# Patient Record
Sex: FEMALE | Race: WHITE | NOT HISPANIC OR LATINO | Employment: FULL TIME | URBAN - METROPOLITAN AREA
[De-identification: names, ages, dates, MRNs, and addresses within clinical notes are randomized per-mention and may not be internally consistent; named-entity substitution may affect disease eponyms.]

---

## 2017-06-12 ENCOUNTER — COMMUNICATION - RIVER FALLS (OUTPATIENT)
Dept: FAMILY MEDICINE | Facility: CLINIC | Age: 22
End: 2017-06-12

## 2017-06-12 ENCOUNTER — OFFICE VISIT - RIVER FALLS (OUTPATIENT)
Dept: FAMILY MEDICINE | Facility: CLINIC | Age: 22
End: 2017-06-12

## 2017-06-12 ASSESSMENT — MIFFLIN-ST. JEOR: SCORE: 1211.95

## 2017-09-05 ENCOUNTER — OFFICE VISIT - RIVER FALLS (OUTPATIENT)
Dept: FAMILY MEDICINE | Facility: CLINIC | Age: 22
End: 2017-09-05

## 2017-09-05 ASSESSMENT — MIFFLIN-ST. JEOR: SCORE: 1213.76

## 2017-10-03 ENCOUNTER — OFFICE VISIT - RIVER FALLS (OUTPATIENT)
Dept: FAMILY MEDICINE | Facility: CLINIC | Age: 22
End: 2017-10-03

## 2018-01-04 ENCOUNTER — OFFICE VISIT - RIVER FALLS (OUTPATIENT)
Dept: FAMILY MEDICINE | Facility: CLINIC | Age: 23
End: 2018-01-04

## 2018-01-04 ASSESSMENT — MIFFLIN-ST. JEOR: SCORE: 1234.63

## 2018-01-07 ENCOUNTER — AMBULATORY - RIVER FALLS (OUTPATIENT)
Dept: FAMILY MEDICINE | Facility: CLINIC | Age: 23
End: 2018-01-07

## 2018-01-16 ENCOUNTER — AMBULATORY - RIVER FALLS (OUTPATIENT)
Dept: FAMILY MEDICINE | Facility: CLINIC | Age: 23
End: 2018-01-16

## 2018-01-18 ENCOUNTER — AMBULATORY - RIVER FALLS (OUTPATIENT)
Dept: FAMILY MEDICINE | Facility: CLINIC | Age: 23
End: 2018-01-18

## 2018-04-30 ENCOUNTER — OFFICE VISIT - RIVER FALLS (OUTPATIENT)
Dept: FAMILY MEDICINE | Facility: CLINIC | Age: 23
End: 2018-04-30

## 2018-05-03 ENCOUNTER — OFFICE VISIT - RIVER FALLS (OUTPATIENT)
Dept: FAMILY MEDICINE | Facility: CLINIC | Age: 23
End: 2018-05-03

## 2018-05-03 ASSESSMENT — MIFFLIN-ST. JEOR: SCORE: 1251.64

## 2019-04-10 ENCOUNTER — OFFICE VISIT - RIVER FALLS (OUTPATIENT)
Dept: FAMILY MEDICINE | Facility: CLINIC | Age: 24
End: 2019-04-10
Payer: COMMERCIAL

## 2019-06-24 ENCOUNTER — OFFICE VISIT - RIVER FALLS (OUTPATIENT)
Dept: FAMILY MEDICINE | Facility: CLINIC | Age: 24
End: 2019-06-24
Payer: COMMERCIAL

## 2019-06-26 ENCOUNTER — AMBULATORY - RIVER FALLS (OUTPATIENT)
Dept: FAMILY MEDICINE | Facility: CLINIC | Age: 24
End: 2019-06-26
Payer: COMMERCIAL

## 2019-06-28 LAB
B BURGDOR IGG+IGM SER QL: <0.9
BUN SERPL-MCNC: 11 MG/DL (ref 7–25)
BUN/CREAT RATIO - HISTORICAL: NORMAL (ref 6–22)
CALCIUM SERPL-MCNC: 9.7 MG/DL (ref 8.6–10.2)
CHLORIDE BLD-SCNC: 107 MMOL/L (ref 98–110)
CO2 SERPL-SCNC: 25 MMOL/L (ref 20–32)
CREAT SERPL-MCNC: 0.66 MG/DL (ref 0.5–1.1)
EGFRCR SERPLBLD CKD-EPI 2021: 125 ML/MIN/1.73M2
ERYTHROCYTE [DISTWIDTH] IN BLOOD BY AUTOMATED COUNT: 12.6 % (ref 11–15)
GLUCOSE BLD-MCNC: 95 MG/DL (ref 65–99)
HCT VFR BLD AUTO: 44.5 % (ref 35–45)
HGB BLD-MCNC: 14.4 GM/DL (ref 11.7–15.5)
MCH RBC QN AUTO: 27.4 PG (ref 27–33)
MCHC RBC AUTO-ENTMCNC: 32.4 GM/DL (ref 32–36)
MCV RBC AUTO: 84.8 FL (ref 80–100)
PLATELET # BLD AUTO: 307 10*3/UL (ref 140–400)
PMV BLD: 10.4 FL (ref 7.5–12.5)
POTASSIUM BLD-SCNC: 4.2 MMOL/L (ref 3.5–5.3)
RBC # BLD AUTO: 5.25 10*6/UL (ref 3.8–5.1)
SODIUM SERPL-SCNC: 141 MMOL/L (ref 135–146)
WBC # BLD AUTO: 7.7 10*3/UL (ref 3.8–10.8)

## 2019-07-02 ENCOUNTER — TRANSFERRED RECORDS (OUTPATIENT)
Dept: HEALTH INFORMATION MANAGEMENT | Facility: CLINIC | Age: 24
End: 2019-07-02

## 2019-07-02 ENCOUNTER — COMMUNICATION - RIVER FALLS (OUTPATIENT)
Dept: FAMILY MEDICINE | Facility: CLINIC | Age: 24
End: 2019-07-02
Payer: COMMERCIAL

## 2019-07-03 ENCOUNTER — HOSPITAL ENCOUNTER (INPATIENT)
Facility: CLINIC | Age: 24
LOS: 1 days | Discharge: HOME | DRG: 060 | End: 2019-07-04
Attending: EMERGENCY MEDICINE | Admitting: PSYCHIATRY & NEUROLOGY
Payer: COMMERCIAL

## 2019-07-03 DIAGNOSIS — G35 MULTIPLE SCLEROSIS (H): Primary | ICD-10-CM

## 2019-07-03 DIAGNOSIS — G81.94 LEFT HEMIPARESIS (H): ICD-10-CM

## 2019-07-03 PROBLEM — G37.9 DEMYELINATING DISEASE OF CENTRAL NERVOUS SYSTEM (H): Status: ACTIVE | Noted: 2019-07-03

## 2019-07-03 LAB
ANION GAP SERPL CALCULATED.3IONS-SCNC: 6 MMOL/L (ref 3–14)
APPEARANCE CSF: CLEAR
BASOPHILS # BLD AUTO: 0 10E9/L (ref 0–0.2)
BASOPHILS NFR BLD AUTO: 0.5 %
BUN SERPL-MCNC: 14 MG/DL (ref 7–30)
CALCIUM SERPL-MCNC: 8.8 MG/DL (ref 8.5–10.1)
CHLORIDE SERPL-SCNC: 109 MMOL/L (ref 94–109)
CO2 SERPL-SCNC: 26 MMOL/L (ref 20–32)
COLOR CSF: COLORLESS
CREAT SERPL-MCNC: 0.64 MG/DL (ref 0.52–1.04)
CRP SERPL-MCNC: <2.9 MG/L (ref 0–8)
DIFFERENTIAL METHOD BLD: NORMAL
EOSINOPHIL # BLD AUTO: 0 10E9/L (ref 0–0.7)
EOSINOPHIL NFR BLD AUTO: 0.5 %
ERYTHROCYTE [DISTWIDTH] IN BLOOD BY AUTOMATED COUNT: 12.3 % (ref 10–15)
GFR SERPL CREATININE-BSD FRML MDRD: >90 ML/MIN/{1.73_M2}
GLUCOSE CSF-MCNC: 57 MG/DL (ref 40–70)
GLUCOSE SERPL-MCNC: 90 MG/DL (ref 70–99)
GRAM STN SPEC: NORMAL
HCT VFR BLD AUTO: 40.7 % (ref 35–47)
HGB BLD-MCNC: 13 G/DL (ref 11.7–15.7)
HIV 1+2 AB+HIV1 P24 AG SERPL QL IA: NONREACTIVE
IMM GRANULOCYTES # BLD: 0 10E9/L (ref 0–0.4)
IMM GRANULOCYTES NFR BLD: 0.2 %
INR PPP: 1.31 (ref 0.86–1.14)
LYMPH ABN NFR CSF MANUAL: 97 %
LYMPHOCYTES # BLD AUTO: 2.9 10E9/L (ref 0.8–5.3)
LYMPHOCYTES NFR BLD AUTO: 44.8 %
Lab: NORMAL
MCH RBC QN AUTO: 26.9 PG (ref 26.5–33)
MCHC RBC AUTO-ENTMCNC: 31.9 G/DL (ref 31.5–36.5)
MCV RBC AUTO: 84 FL (ref 78–100)
MONOCYTES # BLD AUTO: 0.5 10E9/L (ref 0–1.3)
MONOCYTES NFR BLD AUTO: 7.6 %
MONOS+MACROS NFR CSF MANUAL: 3 %
NEUTROPHILS # BLD AUTO: 3 10E9/L (ref 1.6–8.3)
NEUTROPHILS NFR BLD AUTO: 46.4 %
NRBC # BLD AUTO: 0 10*3/UL
NRBC BLD AUTO-RTO: 0 /100
PLATELET # BLD AUTO: 219 10E9/L (ref 150–450)
POTASSIUM SERPL-SCNC: 3.9 MMOL/L (ref 3.4–5.3)
PROT CSF-MCNC: 49 MG/DL (ref 15–60)
RBC # BLD AUTO: 4.84 10E12/L (ref 3.8–5.2)
RBC # CSF MANUAL: 0 /UL (ref 0–2)
SODIUM SERPL-SCNC: 141 MMOL/L (ref 133–144)
SPECIMEN SOURCE: NORMAL
TUBE # CSF: 4 #
WBC # BLD AUTO: 6.5 10E9/L (ref 4–11)
WBC # CSF MANUAL: 7 /UL (ref 0–5)

## 2019-07-03 PROCEDURE — 87070 CULTURE OTHR SPECIMN AEROBIC: CPT | Performed by: PSYCHIATRY & NEUROLOGY

## 2019-07-03 PROCEDURE — 82784 ASSAY IGA/IGD/IGG/IGM EACH: CPT | Performed by: PSYCHIATRY & NEUROLOGY

## 2019-07-03 PROCEDURE — 87015 SPECIMEN INFECT AGNT CONCNTJ: CPT | Performed by: PSYCHIATRY & NEUROLOGY

## 2019-07-03 PROCEDURE — 25000128 H RX IP 250 OP 636: Performed by: STUDENT IN AN ORGANIZED HEALTH CARE EDUCATION/TRAINING PROGRAM

## 2019-07-03 PROCEDURE — 82945 GLUCOSE OTHER FLUID: CPT | Performed by: PSYCHIATRY & NEUROLOGY

## 2019-07-03 PROCEDURE — 82042 OTHER SOURCE ALBUMIN QUAN EA: CPT | Performed by: PSYCHIATRY & NEUROLOGY

## 2019-07-03 PROCEDURE — 85025 COMPLETE CBC W/AUTO DIFF WBC: CPT | Performed by: EMERGENCY MEDICINE

## 2019-07-03 PROCEDURE — 88108 CYTOPATH CONCENTRATE TECH: CPT | Performed by: PSYCHIATRY & NEUROLOGY

## 2019-07-03 PROCEDURE — 009U3ZX DRAINAGE OF SPINAL CANAL, PERCUTANEOUS APPROACH, DIAGNOSTIC: ICD-10-PCS | Performed by: PSYCHIATRY & NEUROLOGY

## 2019-07-03 PROCEDURE — 84157 ASSAY OF PROTEIN OTHER: CPT | Performed by: PSYCHIATRY & NEUROLOGY

## 2019-07-03 PROCEDURE — 80048 BASIC METABOLIC PNL TOTAL CA: CPT | Performed by: EMERGENCY MEDICINE

## 2019-07-03 PROCEDURE — 99284 EMERGENCY DEPT VISIT MOD MDM: CPT | Mod: Z6 | Performed by: EMERGENCY MEDICINE

## 2019-07-03 PROCEDURE — 00000102 ZZHCL STATISTIC CYTO WRIGHT STAIN TC: Performed by: PSYCHIATRY & NEUROLOGY

## 2019-07-03 PROCEDURE — 87389 HIV-1 AG W/HIV-1&-2 AB AG IA: CPT | Performed by: PSYCHIATRY & NEUROLOGY

## 2019-07-03 PROCEDURE — 12000001 ZZH R&B MED SURG/OB UMMC

## 2019-07-03 PROCEDURE — 36415 COLL VENOUS BLD VENIPUNCTURE: CPT | Performed by: PSYCHIATRY & NEUROLOGY

## 2019-07-03 PROCEDURE — 86140 C-REACTIVE PROTEIN: CPT | Performed by: PSYCHIATRY & NEUROLOGY

## 2019-07-03 PROCEDURE — 82040 ASSAY OF SERUM ALBUMIN: CPT | Performed by: PSYCHIATRY & NEUROLOGY

## 2019-07-03 PROCEDURE — 25800030 ZZH RX IP 258 OP 636: Performed by: STUDENT IN AN ORGANIZED HEALTH CARE EDUCATION/TRAINING PROGRAM

## 2019-07-03 PROCEDURE — 25000132 ZZH RX MED GY IP 250 OP 250 PS 637: Performed by: STUDENT IN AN ORGANIZED HEALTH CARE EDUCATION/TRAINING PROGRAM

## 2019-07-03 PROCEDURE — 89051 BODY FLUID CELL COUNT: CPT | Performed by: PSYCHIATRY & NEUROLOGY

## 2019-07-03 PROCEDURE — 87205 SMEAR GRAM STAIN: CPT | Performed by: PSYCHIATRY & NEUROLOGY

## 2019-07-03 PROCEDURE — 82164 ANGIOTENSIN I ENZYME TEST: CPT | Performed by: PSYCHIATRY & NEUROLOGY

## 2019-07-03 PROCEDURE — 85610 PROTHROMBIN TIME: CPT | Performed by: PSYCHIATRY & NEUROLOGY

## 2019-07-03 PROCEDURE — 99285 EMERGENCY DEPT VISIT HI MDM: CPT | Performed by: EMERGENCY MEDICINE

## 2019-07-03 PROCEDURE — 83916 OLIGOCLONAL BANDS: CPT | Performed by: PSYCHIATRY & NEUROLOGY

## 2019-07-03 RX ORDER — ACETAMINOPHEN 325 MG/1
650 TABLET ORAL EVERY 4 HOURS PRN
Status: DISCONTINUED | OUTPATIENT
Start: 2019-07-03 | End: 2019-07-04 | Stop reason: HOSPADM

## 2019-07-03 RX ORDER — LIDOCAINE 40 MG/G
CREAM TOPICAL
Status: DISCONTINUED | OUTPATIENT
Start: 2019-07-03 | End: 2019-07-04 | Stop reason: HOSPADM

## 2019-07-03 RX ORDER — LIDOCAINE HYDROCHLORIDE 10 MG/ML
INJECTION, SOLUTION EPIDURAL; INFILTRATION; INTRACAUDAL; PERINEURAL
Status: DISCONTINUED
Start: 2019-07-03 | End: 2019-07-03 | Stop reason: WASHOUT

## 2019-07-03 RX ORDER — ONDANSETRON 2 MG/ML
4 INJECTION INTRAMUSCULAR; INTRAVENOUS EVERY 6 HOURS PRN
Status: DISCONTINUED | OUTPATIENT
Start: 2019-07-03 | End: 2019-07-04 | Stop reason: HOSPADM

## 2019-07-03 RX ORDER — NALOXONE HYDROCHLORIDE 0.4 MG/ML
.1-.4 INJECTION, SOLUTION INTRAMUSCULAR; INTRAVENOUS; SUBCUTANEOUS
Status: DISCONTINUED | OUTPATIENT
Start: 2019-07-03 | End: 2019-07-04 | Stop reason: HOSPADM

## 2019-07-03 RX ORDER — ONDANSETRON 4 MG/1
4 TABLET, ORALLY DISINTEGRATING ORAL EVERY 6 HOURS PRN
Status: DISCONTINUED | OUTPATIENT
Start: 2019-07-03 | End: 2019-07-04 | Stop reason: HOSPADM

## 2019-07-03 RX ADMIN — ENOXAPARIN SODIUM 40 MG: 40 INJECTION SUBCUTANEOUS at 19:16

## 2019-07-03 RX ADMIN — SODIUM CHLORIDE 1000 MG: 9 INJECTION, SOLUTION INTRAVENOUS at 19:16

## 2019-07-03 RX ADMIN — ACETAMINOPHEN 650 MG: 325 TABLET, FILM COATED ORAL at 04:45

## 2019-07-03 RX ADMIN — ACETAMINOPHEN 650 MG: 325 TABLET, FILM COATED ORAL at 09:51

## 2019-07-03 ASSESSMENT — MIFFLIN-ST. JEOR: SCORE: 1289.32

## 2019-07-03 ASSESSMENT — ACTIVITIES OF DAILY LIVING (ADL)
RETIRED_COMMUNICATION: 0-->UNDERSTANDS/COMMUNICATES WITHOUT DIFFICULTY
ADLS_ACUITY_SCORE: 12
FALL_HISTORY_WITHIN_LAST_SIX_MONTHS: NO
ADLS_ACUITY_SCORE: 12
ADLS_ACUITY_SCORE: 15
DRESS: 0-->INDEPENDENT
ADLS_ACUITY_SCORE: 16
RETIRED_EATING: 0-->INDEPENDENT
AMBULATION: 0-->INDEPENDENT
ADLS_ACUITY_SCORE: 12
COGNITION: 0 - NO COGNITION ISSUES REPORTED
TOILETING: 0-->INDEPENDENT
WHICH_OF_THE_ABOVE_FUNCTIONAL_RISKS_HAD_A_RECENT_ONSET_OR_CHANGE?: AMBULATION
BATHING: 0-->INDEPENDENT
SWALLOWING: 0-->SWALLOWS FOODS/LIQUIDS WITHOUT DIFFICULTY
TRANSFERRING: 0-->INDEPENDENT

## 2019-07-03 ASSESSMENT — ENCOUNTER SYMPTOMS
FEVER: 0
BRUISES/BLEEDS EASILY: 0
NAUSEA: 0
CHILLS: 0
HEADACHES: 1
MYALGIAS: 0
EYE DISCHARGE: 0
WEAKNESS: 1
FLANK PAIN: 0
CONFUSION: 0
COUGH: 0
ABDOMINAL PAIN: 0
SORE THROAT: 0
DYSURIA: 0
DIARRHEA: 0
BACK PAIN: 0
SHORTNESS OF BREATH: 0
RHINORRHEA: 0
VOMITING: 0

## 2019-07-03 NOTE — PROGRESS NOTES
"Patient name: Janneth Lucio  MRN: [id number]    Summary: Janneth Lucio is a 24 yo F with PMH significant for two lifetime concussions and migraine headaches presenting for abnormal MRI findings suggestive of demyelinating disease. Pt initially presented to ED on 6/23 with acute onset L sided facial, LUE, LLE weakness on 6/23/19 which has been improving per pt. MRI obtained via outpatient consult 7/2. Originally presented with headache consistent with past migraines, no recent trauma.     Subjective: Patient denies headaches at this time, no new symptoms associated with L sided weakness including nausea, vomiting, fevers, numbness, tingling, pain, tremor, vision disturbance.     Objective:   VITALS:  Temp: 96.2  F (35.7  C) Temp src: Oral BP: 122/69 Pulse: 73   Resp: 16 SpO2: 99 % O2 Device: None (Room air)   Height: 157.5 cm (5' 2\") Weight: 57.6 kg (127 lb)  Estimated body mass index is 23.23 kg/m  as calculated from the following:    Height as of this encounter: 1.575 m (5' 2\").    Weight as of this encounter: 57.6 kg (127 lb).  MENTAL STATUS:  Alert, oriented x3.  Speech fluent with normal naming, repetition, comprehension. Able to follow commands.  CRANIAL NERVES:  Pupils are equal, round, reactive to light.  Extraocular movements full.  Facial sensation, movement, symmetry normal.  Palate moves symmetrically. Tongue midline.  Sternocleidomastoid and trapezius strength intact.  Neck strength was normal.  MOTOR:       Right   Left   Arm flexion:  5/5   4/5    Arm extension: 5/5   4/5   Elbow flexion:   5/5   4/5   Elbow extension:  5/5   4/5   Finger abduction: 5/5   4/5   Finger :   5/5   4/5   Hip flexion:  5/5   4/5   Hip extension:  5/5   4/5   Knee flexion:  5/5   4/5   Knee extension:  5/5   4/5   Dorsiflexion:  5/5   4/5   Plantar flexion:  5/5   4/5  Significant ataxia noted on L side finger-nose-finger exam. Fine finger movement slowed on L side. No baseline tremor noted, no intention tremors, normal " tone, and normal passive ROM.      REFLEXES:      Right   Left   Triceps:  2+   3+   Brachial:  2+   3+   Radial:   2+   3+   Patellar:  2+   3+   Achilles:  2+   3+   3-4 beats of clonus noted on L side, 2-3 beats on R. Babinski showed downgoing toes on R and upward toe response on L side.    GAIT:  No shuffling or apraxia. Normal stance and posturing. No weakness or foot drop noted. Romberg remarkable for falling to L side with eyes closed.     LABS:  CSF glucose and total protein WNL.    CBC RESULTS:   Recent Labs   Lab Test 07/03/19  0203   WBC 6.5   RBC 4.84   HGB 13.0   HCT 40.7   MCV 84   MCH 26.9   MCHC 31.9   RDW 12.3        Last Comprehensive Metabolic Panel:  Sodium   Date Value Ref Range Status   07/03/2019 141 133 - 144 mmol/L Final     Potassium   Date Value Ref Range Status   07/03/2019 3.9 3.4 - 5.3 mmol/L Final     Chloride   Date Value Ref Range Status   07/03/2019 109 94 - 109 mmol/L Final     Carbon Dioxide   Date Value Ref Range Status   07/03/2019 26 20 - 32 mmol/L Final     Anion Gap   Date Value Ref Range Status   07/03/2019 6 3 - 14 mmol/L Final     Glucose   Date Value Ref Range Status   07/03/2019 90 70 - 99 mg/dL Final     Urea Nitrogen   Date Value Ref Range Status   07/03/2019 14 7 - 30 mg/dL Final     Creatinine   Date Value Ref Range Status   07/03/2019 0.64 0.52 - 1.04 mg/dL Final     GFR Estimate   Date Value Ref Range Status   07/03/2019 >90 >60 mL/min/[1.73_m2] Final     Comment:     Non  GFR Calc  Starting 12/18/2018, serum creatinine based estimated GFR (eGFR) will be   calculated using the Chronic Kidney Disease Epidemiology Collaboration   (CKD-EPI) equation.       Calcium   Date Value Ref Range Status   07/03/2019 8.8 8.5 - 10.1 mg/dL Final     CRP Inflammation   Date Value Ref Range Status   07/03/2019 <2.9 0.0 - 8.0 mg/L Final     IMAGING:   MRI from outside lab available through PACS. Multiple non-enhancing lesions and one enhancing lesion seen.    MRI brain and spine pending.    PROCEDURES:  LP performed without incident, awaiting results of labs.       Assessment and Plan:    1. #acute onset L side weakness  #abnormal MRI  #clinically isolated syndrome  Symptoms, course, patient demographics, and imaging from outside clinic concerning for clinically isolated syndrome of demyelinating process such as MS. Vascular, inflammatory, and granulomatous etiologies low suspicion at this time given presentation and imaging findings, but will await CSF results. Unlikely infectious process given afebrile and normal CBC.   - Awaiting CSF analysis including [place] demyelinating panel, oligoclonal banding, protein, WBC, RBC  - MRI brain and spine  - Will wait for CSF findings at this time before starting methylprednisolone    2. #migraines  No headache at this time. Patient reports having home imitrex (sumatriptan) for abortive therapy.  - Tylenol or toradol PRN if presenting in similar fashion to past migraines       Patient was seen and discussed with Bekah White, PGY-2. This document was annotated as needed by Dr. White.   Duke Mhoamud, MS4  [employer]

## 2019-07-03 NOTE — ED NOTES
"[facility]   ED Nurse to Floor Handoff     Janneth Lucio is a 23 year old female who speaks English and lives with family members,  in a home  They arrived in the ED by car from home    ED Chief Complaint: Abnormal Labs (MRI result)    ED Dx;   Final diagnoses:   Left hemiparesis (H)         Needed?: No    Allergies:   Allergies   Allergen Reactions     Pollen [Pollen Extract] Itching     Ragweed   .  Past Medical Hx:   Past Medical History:   Diagnosis Date     Allergic rhinitis, cause unspecified     Allergic rhinitis     Methicillin susceptible Staphylococcus aureus in conditions classified elsewhere and of unspecified site 6/25/02     Unspecified asthma(493.90) 1/11/05    exacerbation of asthma     Unspecified conductive hearing loss      Unspecified otitis media childhood    Recurrent otitis      Baseline Mental status: WDL  Current Mental Status changes: at basesline    Infection present or suspected this encounter: no  Sepsis suspected: No  Isolation type: No active isolations     Activity level - Baseline/Home:  Independent  Activity Level - Current:   Independent    Bariatric equipment needed?: No    In the ED these meds were given: Medications - No data to display    Drips running?  No    Home pump  No    Current LDAs  Peripheral IV 07/03/19 Right (Active)   Site Assessment WDL 7/3/2019  2:07 AM   Line Status Saline locked 7/3/2019  2:07 AM   Phlebitis Scale 0-->no symptoms 7/3/2019  2:07 AM   Infiltration Scale 0 7/3/2019  2:07 AM   Number of days: 0       Labs results:   Labs Ordered and Resulted from Time of ED Arrival Up to the Time of Departure from the ED   CBC WITH PLATELETS DIFFERENTIAL   BASIC METABOLIC PANEL       Imaging Studies: No results found for this or any previous visit (from the past 24 hour(s)).    Recent vital signs:   /75   Pulse 73   Temp 99.3  F (37.4  C) (Oral)   Resp 18   Ht 1.575 m (5' 2\")   Wt 57.6 kg (127 lb)   " LMP 07/02/2019 (Exact Date)   SpO2 98%   BMI 23.23 kg/m      Ju Coma Scale Score: 15 (07/03/19 0207)       Cardiac Rhythm: Other  Pt needs tele? No  Skin/wound Issues: None    Code Status: Full Code    Pain control: pt had none    Nausea control: pt had none    Abnormal labs/tests/findings requiring intervention: pending results, abnormal MRI (outside facility, unable to obtain images, center closed)     Family present during ED course? Yes   Family Comments/Social Situation comments: boyfriend and mother, Radha at bedside and supportive     Tasks needing completion: None    Argentina Potter, RN    [phone] [facility]

## 2019-07-03 NOTE — ED PROVIDER NOTES
History     Chief Complaint   Patient presents with     Abnormal Labs     MRI result     HPI  Janneth Lucio is a 23 year old female who has a past medical history of migraine headaches who presents the emergency department with concerns for ongoing left sided weakness and an abnormal MRI result.  Per Chart Review Patient was seen in the emergency department at an outside hospital on 6/23/2019 for some left face, arm and leg weakness along with a throbbing headache.  Patient reports a long-standing history of migraine headaches however has never had any neurological deficits accompanying them.  At this time patient was seen in the emergency department, had some improving symptoms during her stay, no stroke code was, however patient did have a CT of the head which was unremarkable with no intracranial hemorrhage or mass-effect, no evidence of acute infarct on CT. Patient was discharged home and scheduled for outpatient MRI. Patient was sent in [facility] for further evaluation of abnormal MRI results. Patient reports that since the 23rd she has had continued weakness on her left side. Denies any vision changes, neck pain, fever, chills, nausea, vomiting, chest pain, paresthesias. No new medications, no sick contacts, no recent travel.    I have reviewed the Medications, Allergies, Past Medical and Surgical History, and Social History in the Epic system.    Review of Systems   Constitutional: Negative for chills and fever.   HENT: Negative for congestion, rhinorrhea and sore throat.    Eyes: Negative for discharge.   Respiratory: Negative for cough and shortness of breath.    Cardiovascular: Negative for chest pain and leg swelling.   Gastrointestinal: Negative for abdominal pain, diarrhea, nausea and vomiting.   Endocrine: Negative for polyuria.   Genitourinary: Negative for dysuria and flank pain.   Musculoskeletal: Negative for back pain and myalgias.   Skin: Negative for rash.   Allergic/Immunologic: Negative  "for immunocompromised state.   Neurological: Positive for weakness and headaches.   Hematological: Does not bruise/bleed easily.   Psychiatric/Behavioral: Negative for confusion and suicidal ideas.       Physical Exam   BP: 145/78  Pulse: 101  Temp: 99.3  F (37.4  C)  Resp: 18  Height: 157.5 cm (5' 2\")  Weight: 57.6 kg (127 lb)  SpO2: 99 %      Physical Exam   Constitutional: She is oriented to person, place, and time. She appears well-developed. No distress.   HENT:   Head: Normocephalic and atraumatic.   Right Ear: External ear normal.   Left Ear: External ear normal.   Mouth/Throat: Oropharynx is clear and moist.   Eyes: Pupils are equal, round, and reactive to light. Conjunctivae and EOM are normal.   Neck: Normal range of motion. Neck supple.   Cardiovascular: Normal rate, regular rhythm and normal heart sounds.   Pulmonary/Chest: Effort normal and breath sounds normal. No respiratory distress. She has no wheezes. She has no rales.   Abdominal: Soft. She exhibits no distension. There is no tenderness. There is no rebound and no guarding.   Musculoskeletal: Normal range of motion. She exhibits no tenderness or deformity.   Neurological: She is alert and oriented to person, place, and time. No cranial nerve deficit or sensory deficit.   Motor LUE and LLE 4/5  Motor RUE and RLE 5/5  Spasticity in left lower extremity   +hyperreflexia left  No sensory deficit  Normal speech, thought process   Skin: Skin is warm and dry. No rash noted.   Psychiatric: She has a normal mood and affect. Her behavior is normal.       ED Course        Procedures      Assessments & Plan (with Medical Decision Making)   Janneth Lucio is a 23 year old female who has a past medical history of migraine headaches who presents the emergency department with concerns for ongoing left sided weakness and an abnormal MRI result.  Upon arrival patient is well-appearing, afebrile, no distress.  Patient hemodynamically stable.  Patient here with " ongoing left-sided weakness since 6/23/2019, have normal MRI results at outside hospital concerning for early onset multiple sclerosis.  Neurology at the bedside who recommends admission for further evaluation and work-up of the left-sided hemiparesis.  Patient understands and agrees with the plan.    I have reviewed the nursing notes.    I have reviewed the findings, diagnosis, plan and need for follow up with the patient.       Medication List      There are no discharge medications for this visit.         Final diagnoses:   Left hemiparesis (H)       7/3/2019   [facility], [place], EMERGENCY DEPARTMENT     Sury Werner MD  07/03/19 0513

## 2019-07-03 NOTE — ED TRIAGE NOTES
Pt arrived to the ER with an MRI result that was abnormal and the doctor wanted pt to go to ER and get a neurologist consult. VSS with low grade temp 99.3. Pt also states that she was recently seen in the ER about 10 days ago for left sided weakness and ruled out stroke and was asked to do an MRI. Pt reports that her left sided weakness has improved now.

## 2019-07-03 NOTE — H&P
"[facility]: [place]  Neurology History and Physical    Patient Name:  Janneth Lucio  MRN:  [id number]    :  1995  Date of Admission:  7/3/2019  Date of Service:  July 3, 2019  Primary care provider:  Dianne Worley      Chief Complaint:  Left sided hemiparesis     History of Present Illness:   23 year old female with history of migraine headache, concussion with residual headache but otherwise no significant past medical history who presents following an abrupt onset of left sided weakness that has improved over the past 9 days.  Early in the morning of 2019 Ms. Lucio awoke from her sleep with an odd sensation in her left leg.  She felt like the sensation may have been reduced and she felt an urge to move the leg.  She attempted to climb out of bed and collapsed since she described that her leg \"would not work.\"  She went back to sleep then at about 6 AM, 2 hours after the onset of symptoms, called her mother and went to the local emergency department.  She does have a history of migraine headaches and did have a bad headache at the time, so her left-sided weakness was attributed to a hemiplegic migraine.  She had also begun to have some improvement in the left-sided weakness even during the course of the emergency department visit.  Though over the next couple of days the weakness of her arm, leg, face returned making it difficult to walk.  An outpatient MRI was recommended, and it took some time to arrange this so she did not have an MRI of her brain until  at [facility], which showed hyperintensity throughout the white matter in a pattern that was thought to be consistent with a demyelinating disease.  Her physician then called the neurology department here and she was recommended to present to the emergency department for likely admission to the neurology service.    Her leg is been most affected and she has been walking though quite different from her baseline.  " The left arm has been clumsy/weak as well, and she has felt weakness of the left side of her face.  She has not had any sensory since her initial presentation on 6/23.  The day prior to symptom onset, [place] including lifting heavy loads above her neck and tossing bags into a dumpster.  She does also have frequent chiropractic adjustments to relieve her headaches, and has had one neck adjustment since symptom onset, and another neck adjustment 2 weeks prior. She has not taken sumatriptan for migraines in over 4 weeks.     Leading up to this she has had no history of sensory loss or change that has relapsed and remitted, intermittent weakness of one limb, eye pain, diplopia, blurred vision, or any other visual changes, no systemic symptoms such as weight loss chills or night sweats, no difficulty walking, no difficulty eating or swallowing.     Family history is negative for MS, other demyelinating diseases, strokes early in life, coagulopathies, miscarriages or any other known diseases.  Patient denies any illicit drug use and has never been a smoker.      ROS: A 10-point ROS was performed as per HPI. Pertinent positives and negatives are included above.     PMH:  Past Medical History:   Diagnosis Date     Allergic rhinitis, cause unspecified     Allergic rhinitis     Methicillin susceptible Staphylococcus aureus in conditions classified elsewhere and of unspecified site 6/25/02     Unspecified asthma(493.90) 1/11/05    exacerbation of asthma     Unspecified conductive hearing loss      Unspecified otitis media childhood    Recurrent otitis     Past Surgical History:   Procedure Laterality Date     HC CREATE EARDRUM OPENING,GEN ANESTH  1999, 2001    Myringotomy w tubes     HC CREATE EARDRUM OPENING,GEN ANESTH  4/7/08    RT     HC NASAL ENDOSCOPY, DIAGNOSTIC  4/7/08     HC TYMPANOPLASTY W/O MASTOID, INIT/REV W/O OSS CHAIN RECONST  10/16/06    LT     HC TYMPANOPLASTY W/O  "MASTOID, INIT/REV W/O OSS CHAIN RECONST  10/15/07    RT       Allergies:  Allergies   Allergen Reactions     Pollen [Pollen Extract] Itching     Ragweed       Medications:    No current facility-administered medications for this encounter.   No current outpatient medications on file.    Social History:  Social History     Tobacco Use     Smoking status: Never Smoker     Smokeless tobacco: Never Used     Tobacco comment: second hand smoke in the home   Substance Use Topics     Alcohol use: No       Family History:    Family History   Problem Relation Age of Onset     Allergies Father         Tetanus and penicillin     Obesity Father      Hypertension Maternal Grandmother      Arthritis Maternal Grandmother      Thyroid Disease Maternal Grandmother         Hypothyroidism     C.A.D. Maternal Grandfather      Hypertension Paternal Grandmother      Arthritis Paternal Grandmother      Obesity Paternal Grandmother      Gastrointestinal Disease Paternal Grandfather         pocketing in intestines       Physical Examination:   Vitals: /73   Pulse 87   Temp 99.3  F (37.4  C) (Oral)   Resp 18   Ht 1.575 m (5' 2\")   Wt 57.6 kg (127 lb)   LMP 07/02/2019 (Exact Date)   SpO2 96%   BMI 23.23 kg/m    General: Adult patient, lying in bed, NAD  HEENT: NC/AT, no icterus, op pink and moist  Cardiac: RRR  Chest: non-labored on RA  Abdomen: S/NT/ND  Extremities: Warm, no edema  Skin: No rash or lesion   Psych: Mood pleasant, affect congruent  Neuro:  Mental status: Awake, alert, attentive, oriented to self, time, place, and circumstance. Language is fluent and coherent with intact comprehension of complex commands, naming and repetition.  Cranial nerves: VFF, PERRL, conjugate gaze, EOMI, facial sensation intact, shoulder shrug strong, tongue/uvula midline, no dysarthria.  Funduscopic exam is normal, discs are clear bilaterally.  Face is symmetric at rest and smile is symmetric, however left eyelid closure can be overcome by " examiner.    Motor: There is spasticity of the left lower extremity, normal tone in other 3 extremities.   Right Left   Arm abduction 5/5 4/5   Elbow Flex 5/5 4/5   Elbow Extension 5/5 4/5   Wrist Extension 5/5 4/5   Finger Abduction 5/5 4+/5   Hip FLexion 5/5 4/5   Dorsiflexion  5/5 4+/5   Eversion and inversion is 5/5 bilaterally     Reflexes: Several beats of clonus at left Achilles and left patella.  Left upper extremity is hyperreflexic relative to right.  Elli's positive bilaterally.    Sensory: Intact to light touch, pin, vibration throughout.  There is no sensory level.  Coordination: Left upper extremity appears ataxic, though questionable whether this out of proportion to the strength.  Gait: Some weakness on the left side is evident with toe walking and heel walking.  Gait is a symmetrical, appears to be limping likely 2/2 to left leg spasticity.    Investigations:    MRI brain with and without contrast: This was performed at [facility], currently awaiting for regular business hours so that images can be sent to PACS.  CBC and BMP are within normal limits  Pregnancy test negative      Assessment and Plan:  This is a 23-year-old woman who presents with abrupt onset left-sided weakness of face arm leg, as well as spasticity of the leg with clonus consistent with lesion(s) in the subcortical right hemisphere.  The MRI of the brain is not currently available for review, though was described as likely demyelinating lesions within the brain and brainstem some of which are enhancing.  The differential includes multiple sclerosis, neuromyelitis spectrum disorder, ADEM, CNS lymphoma, as well as vascular etiologies such as vasculitis or acute infarct.  Once the MRI brain images are available for review, we can further narrow this down.      The onset is quite abrupt and quite severe, more than what is typically seen in a first multiple sclerosis flare, so it is prudent to maintain a broad differential diagnosis at  this point. Lifting heavy objects as well as chiropractic adjustments does put her at risk for vertebral dissection which could cause a secondary embolization in the posterior circulation including brainstem. Sumatriptan could increase the stroke risk, though she has not taken any doses in several weeks. No recent history of illness or vaccination makes ADEM less likely, and lymphoma, neurosarcoidosis and vasculitis are less likely in the absence of other systemic symptoms.     Given that her symptoms have been improving, the patient would likely not derive much benefit from high-dose steroids especially given that the onset was roughly 9 days ago.        #Left hemibody paresis  #White matter lesions on MRI, demyelination suspected   - Admit to neurology service for work-up  - Awaiting imaging from [facility] of MRI brain with and without contrast  - Lumbar puncture with protein, cell count and differential, glucose, ACE, cytology and flow cytometry if cells are present, oligoclonal bands in CSF and serum, freeze sample   - Consider MRI with and without contrast of cervical and thoracic cord to assess for disease burden  - HIV test  - CRP to assess for systemic inflammation  - Consider Aquaporin 4 cell based assay (not yet ordered)  - INR pending to ensure safety of lumbar puncture    #Other medical problems  - Acetaminophen as needed for pain  - Zofran as needed for nausea      FEN: Regular diet, thin liquids   PPx: SCDs, enoxaparin to start following LP   Code: Full     Patient was seen and discussed with Dr. Ursula Crook PGY3  Neurology Resident

## 2019-07-03 NOTE — PROCEDURES
[facility] Procedure Note          Lumbar Puncture:      Time: 11:35 AM  Performed by: Bekah White MD  Authorized by: Kenneth Vergara MD    Indications: abnormal neurologic exam    Consent given by: Patient who states understanding of the procedure being performed after discussing the risks, benefits and alternatives.    Prior to the start of the procedure and with procedural staff participation, I verbally confirmed the patient s identity using two indicators, relevant allergies, that the procedure was appropriate and matched the consent or emergent situation, and that the correct equipment/implants were available. Immediately prior to starting the procedure I conducted the Time Out with the procedural staff and re-confirmed the patient s name, procedure, and site/side. (The Joint Commission universal protocol was followed.) Yes    Under sterile conditions the patient was positioned Sitting, bent forward. Betadine solution and sterile drapes were utilized.  Local anesthetic at the site: 3ml of lidocaine 1% without epinephrine from the LP tray  A 21 G  spinal needle was inserted at the L 3-4 interspace.  Opening Pressurewas not checked.  A total of 16 mL of clear and colorless spinal fluid was obtained and sent to the laboratory.   After the needle was removed, a bandaid and pressure were applied and the patient was instructed to stay horizontal until the results were back.    Complications:  None    Patient tolerance: Patient tolerated the procedure well. Patient endorsed headache after procedure which began to resolve upon lying down.    Bekah White MD  Neurology PGY-2  [phone]

## 2019-07-04 ENCOUNTER — APPOINTMENT (OUTPATIENT)
Dept: MRI IMAGING | Facility: CLINIC | Age: 24
DRG: 060 | End: 2019-07-04
Payer: COMMERCIAL

## 2019-07-04 ENCOUNTER — APPOINTMENT (OUTPATIENT)
Dept: PHYSICAL THERAPY | Facility: CLINIC | Age: 24
DRG: 060 | End: 2019-07-04
Payer: COMMERCIAL

## 2019-07-04 VITALS
WEIGHT: 127 LBS | OXYGEN SATURATION: 98 % | TEMPERATURE: 96.8 F | SYSTOLIC BLOOD PRESSURE: 115 MMHG | BODY MASS INDEX: 23.37 KG/M2 | DIASTOLIC BLOOD PRESSURE: 60 MMHG | HEIGHT: 62 IN | HEART RATE: 63 BPM | RESPIRATION RATE: 16 BRPM

## 2019-07-04 LAB
ANION GAP SERPL CALCULATED.3IONS-SCNC: 10 MMOL/L (ref 3–14)
BUN SERPL-MCNC: 10 MG/DL (ref 7–30)
CALCIUM SERPL-MCNC: 9.6 MG/DL (ref 8.5–10.1)
CHLORIDE SERPL-SCNC: 107 MMOL/L (ref 94–109)
CO2 SERPL-SCNC: 22 MMOL/L (ref 20–32)
CREAT SERPL-MCNC: 0.52 MG/DL (ref 0.52–1.04)
ERYTHROCYTE [DISTWIDTH] IN BLOOD BY AUTOMATED COUNT: 12.1 % (ref 10–15)
GFR SERPL CREATININE-BSD FRML MDRD: >90 ML/MIN/{1.73_M2}
GLUCOSE SERPL-MCNC: 146 MG/DL (ref 70–99)
HCT VFR BLD AUTO: 47.4 % (ref 35–47)
HGB BLD-MCNC: 15 G/DL (ref 11.7–15.7)
MCH RBC QN AUTO: 26.9 PG (ref 26.5–33)
MCHC RBC AUTO-ENTMCNC: 31.6 G/DL (ref 31.5–36.5)
MCV RBC AUTO: 85 FL (ref 78–100)
PLATELET # BLD AUTO: 271 10E9/L (ref 150–450)
POTASSIUM SERPL-SCNC: 3.8 MMOL/L (ref 3.4–5.3)
RBC # BLD AUTO: 5.58 10E12/L (ref 3.8–5.2)
SODIUM SERPL-SCNC: 138 MMOL/L (ref 133–144)
WBC # BLD AUTO: 11.6 10E9/L (ref 4–11)

## 2019-07-04 PROCEDURE — 25500064 ZZH RX 255 OP 636: Performed by: PSYCHIATRY & NEUROLOGY

## 2019-07-04 PROCEDURE — 97161 PT EVAL LOW COMPLEX 20 MIN: CPT | Mod: GP

## 2019-07-04 PROCEDURE — 72157 MRI CHEST SPINE W/O & W/DYE: CPT

## 2019-07-04 PROCEDURE — 36415 COLL VENOUS BLD VENIPUNCTURE: CPT | Performed by: PSYCHIATRY & NEUROLOGY

## 2019-07-04 PROCEDURE — A9585 GADOBUTROL INJECTION: HCPCS | Performed by: PSYCHIATRY & NEUROLOGY

## 2019-07-04 PROCEDURE — 85027 COMPLETE CBC AUTOMATED: CPT | Performed by: PSYCHIATRY & NEUROLOGY

## 2019-07-04 PROCEDURE — 25800030 ZZH RX IP 258 OP 636: Performed by: STUDENT IN AN ORGANIZED HEALTH CARE EDUCATION/TRAINING PROGRAM

## 2019-07-04 PROCEDURE — 80048 BASIC METABOLIC PNL TOTAL CA: CPT | Performed by: PSYCHIATRY & NEUROLOGY

## 2019-07-04 PROCEDURE — 86255 FLUORESCENT ANTIBODY SCREEN: CPT | Performed by: PSYCHIATRY & NEUROLOGY

## 2019-07-04 PROCEDURE — 97530 THERAPEUTIC ACTIVITIES: CPT | Mod: GP

## 2019-07-04 PROCEDURE — 97110 THERAPEUTIC EXERCISES: CPT | Mod: GP

## 2019-07-04 PROCEDURE — 25000132 ZZH RX MED GY IP 250 OP 250 PS 637: Performed by: STUDENT IN AN ORGANIZED HEALTH CARE EDUCATION/TRAINING PROGRAM

## 2019-07-04 PROCEDURE — 72156 MRI NECK SPINE W/O & W/DYE: CPT

## 2019-07-04 PROCEDURE — 84999 UNLISTED CHEMISTRY PROCEDURE: CPT | Performed by: PSYCHIATRY & NEUROLOGY

## 2019-07-04 PROCEDURE — 25000128 H RX IP 250 OP 636: Performed by: STUDENT IN AN ORGANIZED HEALTH CARE EDUCATION/TRAINING PROGRAM

## 2019-07-04 PROCEDURE — 97116 GAIT TRAINING THERAPY: CPT | Mod: GP

## 2019-07-04 RX ORDER — PANTOPRAZOLE SODIUM 40 MG/1
40 TABLET, DELAYED RELEASE ORAL
Qty: 3 TABLET | Refills: 0 | Status: SHIPPED | OUTPATIENT
Start: 2019-07-05 | End: 2019-07-16

## 2019-07-04 RX ORDER — PANTOPRAZOLE SODIUM 40 MG/1
40 TABLET, DELAYED RELEASE ORAL
Status: DISCONTINUED | OUTPATIENT
Start: 2019-07-04 | End: 2019-07-04 | Stop reason: HOSPADM

## 2019-07-04 RX ORDER — GADOBUTROL 604.72 MG/ML
7.5 INJECTION INTRAVENOUS ONCE
Status: COMPLETED | OUTPATIENT
Start: 2019-07-04 | End: 2019-07-04

## 2019-07-04 RX ORDER — PREDNISONE 50 MG/1
1250 TABLET ORAL DAILY
Qty: 75 TABLET | Refills: 0 | Status: SHIPPED | OUTPATIENT
Start: 2019-07-04 | End: 2019-07-16

## 2019-07-04 RX ADMIN — GADOBUTROL 5 ML: 604.72 INJECTION INTRAVENOUS at 08:35

## 2019-07-04 RX ADMIN — SODIUM CHLORIDE 1000 MG: 9 INJECTION, SOLUTION INTRAVENOUS at 13:15

## 2019-07-04 RX ADMIN — PANTOPRAZOLE SODIUM 40 MG: 40 TABLET, DELAYED RELEASE ORAL at 10:16

## 2019-07-04 ASSESSMENT — ACTIVITIES OF DAILY LIVING (ADL)
ADLS_ACUITY_SCORE: 12

## 2019-07-04 NOTE — DISCHARGE SUMMARY
[facility]  NEUROLOGY DISCHARGE SUMMARY    Patient Name:  Janneth Lucio  MRN:  [id number]      :  1995      Date of Admission:  7/3/2019  Date of Discharge:  2019  Admitting Physician:  Kenneth Vergara MD  Discharge Physician:  Kenneth Vergara MD  Primary Care Provider:   Dianne Worley  Discharge Disposition:   Discharged to home    Admission Diagnoses:  Left hemiparesis  Demyelinating disease of the brain, unspecified    Discharge Diagnoses:    Multiple sclerosis    Brief History of Illness:   This is a 23-year-old woman who presents with abrupt onset left-sided weakness of face arm leg, as well as spasticity of the leg with clonus found to have lesion(s) in the subcortical right hemisphere.     Hospital Course:  Patient admitted to neurology service on 7/3/2019 for workup of left hemiparesis with acute onset about a week prior to presentation. When symptoms failed to resolve, she sought diagnostic testing and underwent MRI brain at [facility], which revealed multiple demyelinating lesions of the brain. She was advised to go to the ED for further evaluation and treatment. [facility] was called to request that the images be made available in PACS. Lumbar puncture was performed on the morning of admission. Labs checked were CSF protein, cell count and differential, glucose (all normal) as well as ACE, oligoclonal bands in CSF and serum (pending). A sample was also frozen. HIV negative, CRP wnl. On 7/3 the patient was started on methylprednisolone 1,000 mg IV daily x 5 days. MRI thoracic and lumbar spine were performed on , revealing an additional demyelinating lesion in the spinal cord which appears characteristic of MS. The patient was given a diagnosis of multiple sclerosis, accompanied by her mother and sister. She received a second dose of IV methylprednisolone on the day of discharge, and was sent home with seventy-five 50-mg tabs oral prednisone. She was  "instructed to take prednisone 1,250 mg PO daily, or twenty-five 50-mg tablets daily, for 3 days to complete a 5-day course of high-dose steroids for MS exacerbation. She was also discharged with instructions to continue PPI while on steroids for the following three days.    Pertinent Investigations:  MRI Brain w/ and w/o contrast  7/2/2019 (OSH)  There is an enhancing and slightly expansive lesion in the right posterior limb of the internal capsule, as well as few small foci of T2 hyperintense lesions in juxtacortiocal white matter and possibly a small T2 focus in the left middle cerebellar peduncle.     MRI cervical/thoracic spine w/ and w/o contrast  7/4/2019  There is an ovoid lesion in the spinal cord at the level of C3.    CSF Studies  RBC: 0  WBC: 7 (lymphocytic predominance, 97%)  Protein: 49  Glucose: 57    Pending labs:  Oligoclonal bands CSF    Consultations:    None    Recommendations and Follow-up:  - Follow up with Dr. Elizalde in 2 weeks  - Outpatient PT    Discharge physical examination:   /60   Pulse 63   Temp 96.8  F (36  C) (Oral)   Resp 16   Ht 1.575 m (5' 2\")   Wt 57.6 kg (127 lb)   LMP 07/02/2019 (Exact Date)   SpO2 98%   BMI 23.23 kg/m    General: Adult patient dressed in street clothes, sitting in chair  HEENT: NC/AT, mucous membranes moist  Extremities: Warm, no edema.   Skin: No rash or lesion.   Psych: Calm, pleasant affect.  Neuro:  Mental status: Awake, alert, attentive, oriented to person, place, situation  Cranial nerves: PERRL, EOMI, facial movements full and symmetric.   Motor: Strength 5/5 in BUE and 5/5 in BLE flexors, 4+/5 in LLE dorsiflexion.   Reflexes: RLE patellar and Achilles reflexes brisk.   Sensory: INtact to light touch in all extremities.   Coordination: FNF with mild to moderate ataxia in left upper extremity, improved since admission.  Gait: Walks with slight limp on LLE, normal pace, arm swing, turn.      Discharge Medications:  Current Discharge " Medication List      START taking these medications    Details   pantoprazole (PROTONIX) 40 MG EC tablet Take 1 tablet (40 mg) by mouth every morning (before breakfast)  Qty: 3 tablet, Refills: 0    Associated Diagnoses: Multiple sclerosis (H)      predniSONE (DELTASONE) 50 MG tablet Take 25 tablets (1,250 mg) by mouth daily for 3 days  Qty: 75 tablet, Refills: 0    Associated Diagnoses: Multiple sclerosis (H)             Discharge follow up and instructions:     Physical Therapy Referral      Reason for your hospital stay    You were hospitalized for evaluation of acute onset left-sided weakness.     Activity    Your activity upon discharge: activity as tolerated     Discharge Instructions    1. Take prednisone 1,250 mg (twenty-five 50-mg tablets) daily for 3 days. You do not need to take all twenty-five tablets in one sitting, as long as you take all twenty-five tablets within the course of the day.     2. Please also take one 40-mg tablet of pantoprazole on each day that you take prednisone.     3. Outpatient physical therapy has been ordered for you, which can be completed at a location of your choosing.     Adult [facility] Follow-up and recommended labs and tests    Follow up with Dr. Elizalde at [facility], within 2 weeks regarding new diagnosis of multiple sclerosis. No follow up labs or test are needed.    Appointments on [place] and/or [facility] (with [facility] or [facility] provider or service). Call [phone] if you haven't heard regarding these appointments within 7 days of discharge.     Full Code     Diet    Follow this diet upon discharge: Orders Placed This Encounter      Regular Diet Adult       Patient seen and discussed with attending Dr. Vergara.    Bekah White MD  Neurology PGY-2  [phone]

## 2019-07-04 NOTE — PROGRESS NOTES
"VSS; A&O x 4. Neurologically stable, L sided weakness improving and \"tip of nose\" tingling acknowledged by MD, pt remains stable per MD. Pt received steroid infusion and discharged to home with family after mom picked up meds from discharge pharmacy. AVS teaching performed with writer, pt, and patient's sister present.  "

## 2019-07-04 NOTE — PROGRESS NOTES
07/04/19 1100   Quick Adds   Type of Visit Initial PT Evaluation   Living Environment   Lives With significant other   Living Arrangements house   Home Accessibility stairs to enter home;stairs within home   Number of Stairs, Main Entrance 2   Stair Railings, Main Entrance none   Number of Stairs, Within Home, Primary 6   Stair Railings, Within Home, Primary railing on left side (ascending)   Transportation Anticipated car, drives self;family or friend will provide   Living Environment Comment Pt lives wiht supportive SO who does work full time. Pt Mom is an OT and very helpful, sister also present today and supportive. Pt's SO does work so pt will be home during days alone. Pt reports has been doing well over the last2 weeks of onset of gradually gaining back L UE strength, LE came back quicker. Pt states has been home alone the last week and doing well, work within her limits.   Self-Care   Regular Exercise   (has been doing UE/LE exercises since onset (mom OT))   Equipment Currently Used at Home   (wh walker as needed, but only first week, not this last week)   Activity/Exercise/Self-Care Comment Pt has had some set up help with shower, otherwise I.   Functional Level Prior   Ambulation 0-->independent   Transferring 0-->independent   Toileting 0-->independent   Bathing 0-->independent   Communication 0-->understands/communicates without difficulty   Swallowing 0-->swallows foods/liquids without difficulty   Cognition 0 - no cognition issues reported   Fall history within last six months no   Which of the above functional risks had a recent onset or change? ambulation;transferring;dressing;bathing   Prior Functional Level Comment 2 weeks ago  with onset using walker for first couple hours, the would be doing ok without. This last week not using walker, no real help at home except driving to appointments. Havent been able to to do dishes much yet.   General Information   Onset of Illness/Injury or Date of  "Surgery - Date 07/03/19   Referring Physician Alpesh Ley MD   Patient/Family Goals Statement return home, keep working on getting strength and function back, figure out diagnosis   Pertinent History of Current Problem (include personal factors and/or comorbidities that impact the POC) 23 year old female with history of migraine headache, concussion with residual headache but otherwise no significant past medical history who presents following an abrupt onset of left sided weakness that has improved over the past 9 days.  Early in the morning of 6/23/2019 Ms. Lucio awoke from her sleep with an odd sensation in her left leg.  She felt like the sensation may have been reduced and she felt an urge to move the leg.  She attempted to climb out of bed and collapsed since she described that her leg \"would not work.\"  She went back to sleep then at about 6 AM, 2 hours after the onset of symptoms, called her mother and went to the local emergency department.  She does have a history of migraine headaches and did have a bad headache at the time, so her left-sided weakness was attributed to a hemiplegic migraine.  She had also begun to have some improvement in the left-sided weakness even during the course of the emergency department visit.  Though over the next couple of days the weakness of her arm, leg, face returned making it difficult to walk.  An outpatient MRI was recommended, and it took some time to arrange this so she did not have an MRI of her brain until 7/2 at [facility], which showed hyperintensity throughout the white matter in a pattern that was thought to be consistent with a demyelinating disease.  Her physician then called the neurology department here and she was recommended to present to the emergency department for likely admission to the neurology service.   Precautions/Limitations fall precautions   Weight-Bearing Status - LLE full weight-bearing   Weight-Bearing Status - RLE full weight-bearing " "  General Info Comments Pt very agreeable to working with PT   Cognitive Status Examination   Orientation orientation to person, place and time   Level of Consciousness alert   Follows Commands and Answers Questions 100% of the time   Personal Safety and Judgment intact   Memory intact   Posture    Posture Protracted shoulders   Range of Motion (ROM)   ROM Comment B LEs full ROM, L UE mildly limited end lindsey   Strength   Strength Comments L LE add/hip flexion 4/5 and ankle 3+/5, otherwise 5/5 grossly   Bed Mobility   Bed Mobility Comments mod I sup<>sit   Transfer Skills   Transfer Comments SBA sit<>stand   Gait   Gait Comments ' no AD   Balance   Balance Comments good w walker, fair without when fatigued. Static balance NBOS good, moderately impaired L SLS at 3 sec, R SLS 10 sec   Coordination   Coordination Comments L UE mildly impaire gross coord, moderately impaired fine motor. L LE gross coord mildlyimpaired   General Therapy Interventions   Planned Therapy Interventions balance training;gait training;neuromuscular re-education;strengthening;risk factor education;home program guidelines;progressive activity/exercise   Clinical Impression   Criteria for Skilled Therapeutic Intervention yes, treatment indicated   PT Diagnosis impaired functional mobility   Influenced by the following impairments decreased balance, strength, coordination,proprioception   Functional limitations due to impairments decreased I gait, transfers, stairs   Clinical Presentation Stable/Uncomplicated   Clinical Decision Making (Complexity) Low complexity   Predicted Duration of Therapy Intervention (days/wks) eval and 1x treatment only   Anticipated Discharge Disposition Home with Assist;Home with Outpatient Therapy   Risk & Benefits of therapy have been explained Yes   Patient, Family & other staff in agreement with plan of care Yes   [facility] AM-PAC TM \"6 Clicks\"   2016, Trustees of [facility], under license to " "[employer].  All rights reserved.   6 Clicks Short Forms Basic Mobility Inpatient Short Form   [facility] AM-PAC  \"6 Clicks\" V.2 Basic Mobility Inpatient Short Form   1. Turning from your back to your side while in a flat bed without using bedrails? 4 - None   2. Moving from lying on your back to sitting on the side of a flat bed without using bedrails? 4 - None   3. Moving to and from a bed to a chair (including a wheelchair)? 3 - A Little   4. Standing up from a chair using your arms (e.g., wheelchair, or bedside chair)? 4 - None   5. To walk in hospital room? 3 - A Little   6. Climbing 3-5 steps with a railing? 3 - A Little   Basic Mobility Raw Score (Score out of 24.Lower scores equate to lower levels of function) 21     "

## 2019-07-05 ENCOUNTER — TELEPHONE (OUTPATIENT)
Dept: NEUROLOGY | Facility: CLINIC | Age: 24
End: 2019-07-05

## 2019-07-05 LAB
ACE CSF-CCNC: 0.9 U/L (ref 0–2.5)
ALB CSF/SERPL: 4.4 RATIO (ref 0–9)
ALBUMIN CSF-MCNC: 18 MG/DL (ref 0–35)
ALBUMIN SERPL-MCNC: 4110 MG/DL (ref 3500–5200)
COPATH REPORT: NORMAL
IGG CSF-MCNC: 4.4 MG/DL (ref 0–6)
IGG SERPL-MCNC: 1050 MG/DL (ref 768–1632)
IGG SYNTH RATE SER+CSF CALC-MRATE: 7.2 MG/D
IGG/ALB CLEAR SER+CSF-RTO: 0.96 RATIO (ref 0.28–0.66)
IGG/ALB CSF: 0.24 RATIO (ref 0.09–0.25)
MISCELLANEOUS TEST: NORMAL
MISCELLANEOUS TEST: NORMAL
OLIGOCLONAL BANDS CSF ELPH-IMP: ABNORMAL
OLIGOCLONAL BANDS CSF ELPH-IMP: POSITIVE
OLIGOCLONAL BANDS CSF IEF: 10 BANDS (ref 0–1)

## 2019-07-05 NOTE — TELEPHONE ENCOUNTER
[facility] Call Center    Phone Message    May a detailed message be left on voicemail: yes    Reason for Call: Other: Pt was discharged from the hospital on 7/4 with instructions to follow up with Dr Kelton Elizalde in 2 weeks. Currently he is booking into the end of August, please call pt to discuss, thanks~!     Action Taken: Message routed to:  [facility] ([facility]): Neurology

## 2019-07-08 LAB
BACTERIA SPEC CULT: NO GROWTH
Lab: NORMAL
SPECIMEN SOURCE: NORMAL

## 2019-07-08 NOTE — TELEPHONE ENCOUNTER
Dr. Elizalde sees no reason why he was specifically recommended for follow up, however, if she specifically wants him, he could likely fit her in to his schedule on 8/8/19; I did leave a VMM for her letting her know this, as well as the fact that we have 3 other providers, Dr. Dee, Dr. Del Cid and Dr. Victor, all who would be more than capable of seeing her and have sooner availability than Dr. Elizalde.    Christianne Barcenas, MS RN Care Coordinator

## 2019-07-08 NOTE — TELEPHONE ENCOUNTER
Dr. Elizalde, please see message from the call center; Do you want to fit this patient in to your schedule anywhere? Or should I have her come in and see whichever provider is available? Thank you.    Christianne Barcenas, MS RN Care Coordinator

## 2019-07-11 LAB
RESULT: NORMAL
SEND OUTS MISC TEST CODE: NORMAL
SEND OUTS MISC TEST SPECIMEN: NORMAL
TEST NAME: NORMAL

## 2019-07-11 NOTE — TELEPHONE ENCOUNTER
RECORDS RECEIVED FROM: Internal - [facility] Discharge Follow Up   DATE RECEIVED: 7/16/19   NOTES (FOR ALL VISITS) STATUS DETAILS   OFFICE NOTE from referring provider Internal [facility]:   7/3/19-7/4/19   OFFICE NOTE from other specialist N/A    DISCHARGE SUMMARY from hospital N/A    DISCHARGE REPORT from the ER Care Everywhere [facility]:  6/23/19   OPERATIVE REPORT N/A    MEDICATION LIST Internal    IMAGING  (FOR ALL VISITS)     EMG N/A    EEG N/A    ECT N/A    MRI (HEAD, NECK, SPINE) Received [facility]:  MRI Cervical Spine 7/4/19  MRI Thoracic Spine 7/4/19    [facility]:  MRI Brain 7/2/19   LUMBAR PUNCTURE Internal 7/3/19   HENRY Scan N/A    CT (HEAD, NECK, SPINE) Received  [facility]:  CT Head Brain 6/23/19      Phone Call:     Contact Name [facility]   Outcome Images will be pushed          No

## 2019-07-12 NOTE — TELEPHONE ENCOUNTER
Imaging Received  [facility]   Image Type (x): Disc:   PACS: x   Exam Date/Name CT Head Brain 6/23/19 Comments: Images resolved in PACS

## 2019-07-16 ENCOUNTER — PRE VISIT (OUTPATIENT)
Dept: NEUROLOGY | Facility: CLINIC | Age: 24
End: 2019-07-16

## 2019-07-16 ENCOUNTER — MEDICAL CORRESPONDENCE (OUTPATIENT)
Dept: HEALTH INFORMATION MANAGEMENT | Facility: CLINIC | Age: 24
End: 2019-07-16

## 2019-07-16 ENCOUNTER — OFFICE VISIT (OUTPATIENT)
Dept: NEUROLOGY | Facility: CLINIC | Age: 24
End: 2019-07-16
Attending: PSYCHIATRY & NEUROLOGY
Payer: COMMERCIAL

## 2019-07-16 VITALS
BODY MASS INDEX: 23.04 KG/M2 | HEART RATE: 87 BPM | DIASTOLIC BLOOD PRESSURE: 78 MMHG | WEIGHT: 125.2 LBS | HEIGHT: 62 IN | SYSTOLIC BLOOD PRESSURE: 118 MMHG

## 2019-07-16 DIAGNOSIS — G37.9 DEMYELINATING DISEASE OF CENTRAL NERVOUS SYSTEM (H): Primary | ICD-10-CM

## 2019-07-16 DIAGNOSIS — G37.9 DEMYELINATING DISEASE OF CENTRAL NERVOUS SYSTEM (H): ICD-10-CM

## 2019-07-16 LAB
BASOPHILS # BLD AUTO: 0 10E9/L (ref 0–0.2)
BASOPHILS NFR BLD AUTO: 0.2 %
DIFFERENTIAL METHOD BLD: ABNORMAL
EOSINOPHIL # BLD AUTO: 0 10E9/L (ref 0–0.7)
EOSINOPHIL NFR BLD AUTO: 0.4 %
ERYTHROCYTE [DISTWIDTH] IN BLOOD BY AUTOMATED COUNT: 12.7 % (ref 10–15)
HCT VFR BLD AUTO: 45.3 % (ref 35–47)
HGB BLD-MCNC: 14.6 G/DL (ref 11.7–15.7)
IMM GRANULOCYTES # BLD: 0 10E9/L (ref 0–0.4)
IMM GRANULOCYTES NFR BLD: 0.4 %
LYMPHOCYTES # BLD AUTO: 3.3 10E9/L (ref 0.8–5.3)
LYMPHOCYTES NFR BLD AUTO: 32.2 %
MCH RBC QN AUTO: 27.6 PG (ref 26.5–33)
MCHC RBC AUTO-ENTMCNC: 32.2 G/DL (ref 31.5–36.5)
MCV RBC AUTO: 86 FL (ref 78–100)
MONOCYTES # BLD AUTO: 0.7 10E9/L (ref 0–1.3)
MONOCYTES NFR BLD AUTO: 6.9 %
NEUTROPHILS # BLD AUTO: 6.2 10E9/L (ref 1.6–8.3)
NEUTROPHILS NFR BLD AUTO: 59.9 %
NRBC # BLD AUTO: 0 10*3/UL
NRBC BLD AUTO-RTO: 0 /100
PLATELET # BLD AUTO: 278 10E9/L (ref 150–450)
RBC # BLD AUTO: 5.29 10E12/L (ref 3.8–5.2)
WBC # BLD AUTO: 10.3 10E9/L (ref 4–11)

## 2019-07-16 PROCEDURE — 82306 VITAMIN D 25 HYDROXY: CPT | Performed by: PSYCHIATRY & NEUROLOGY

## 2019-07-16 PROCEDURE — 36415 COLL VENOUS BLD VENIPUNCTURE: CPT | Performed by: PSYCHIATRY & NEUROLOGY

## 2019-07-16 PROCEDURE — 40000975 ZZHCL STATISTIC JC VIR AB INDEX INHIB: Performed by: PSYCHIATRY & NEUROLOGY

## 2019-07-16 PROCEDURE — G0463 HOSPITAL OUTPT CLINIC VISIT: HCPCS

## 2019-07-16 PROCEDURE — 85025 COMPLETE CBC W/AUTO DIFF WBC: CPT | Performed by: PSYCHIATRY & NEUROLOGY

## 2019-07-16 RX ORDER — NORGESTIMATE AND ETHINYL ESTRADIOL 0.25-0.035
KIT ORAL DAILY
COMMUNITY
End: 2023-08-18

## 2019-07-16 RX ORDER — ALBUTEROL SULFATE 90 UG/1
AEROSOL, METERED RESPIRATORY (INHALATION) DAILY PRN
COMMUNITY
End: 2023-01-17

## 2019-07-16 ASSESSMENT — ENCOUNTER SYMPTOMS
NUMBNESS: 0
LOSS OF CONSCIOUSNESS: 0
PARALYSIS: 1
TREMORS: 0
TINGLING: 1
SPEECH CHANGE: 0
SEIZURES: 0
HEADACHES: 1
DISTURBANCES IN COORDINATION: 1
MEMORY LOSS: 0
DIZZINESS: 1
WEAKNESS: 1

## 2019-07-16 ASSESSMENT — MIFFLIN-ST. JEOR: SCORE: 1281.15

## 2019-07-16 ASSESSMENT — PAIN SCALES - GENERAL: PAINLEVEL: NO PAIN (0)

## 2019-07-16 NOTE — PROGRESS NOTES
DEPARTMENT OF NEUROLOGY  MULTIPLE SCLEROSIS CLINIC  Patient Name: Janneth Lucio  MRN: [id number]  : 1995  Date of Clinic Visit: 2019  Primary Care Provider: Dianne Worley  Referring Provider: Referred Self    CHIEF COMPLAINT: Hospital follow-up for left hemiparesis with suspected diagnosis of multiple sclerosis      HISTORY OF PRESENT ILLNESS:  Janneth Lucio is a 23 year old female presenting for hospital follow-up with suspected MS.   Janneth first developed symptoms early morning on .  She woke up at 4 AM, she thinks she was prompted by the hemiparesis.  When she woke up she realized that her left arm would not move at all, her left leg was able to swing over the bed when she tried to stand up she collapsed.  This woke her boyfriend up and when he tried to speak to her he noticed that she was very dysarthric with a left facial droop.  Her symptoms persisted but she was soon able to walk within about an hour.  She denies any sensory disturbance including numbness, tingling, or pain during this time.  This persisted for a few hours and then ultimately went to the emergency department.  That initial assessments, they thought that she had a hemiplegic migraine that she suffers from migraines.  She has never had a migraine like this and in fact during that day had only very mild cephalgia.  They gave her IV fluids, metoclopramide, Toradol and she actually had symptom improvement nearly back to baseline.  She went home and took a nap for a few hours then woke up and realized that the left hemiparesis was back.   The next day with her symptoms persistent, she called her primary care provider.  Her primary care ordered an MRI brain but she was unable to get it until 10 days after symptom onset.  Her primary care also got her a Rollator walker which she used intermittently.  She got the MRI done on  and was called back by her primary care doc later that day to go to the  [place] emergency room.  The MRI demonstrated 3 nonenhancing T2 hyperintense lesions in the cerebral cortical white matter, none in the cerebellum, and one enhancing lesion is very large and extending down to the brainstem that did enhance.   She presented to [facility] emergency room in the early morning on July 3.  At that point her presentation still demonstrated 4/5 weakness throuhgout the L side and weak L eyelid closure.  They admitted her and performed a lumbar puncture; CSF demonstrated 10 oligoclonal bands, 7 white blood cells, 97% lymphocytes, glucose 57, protein 49.  HIV nonreactive, normal ACE, CRP within normal limits.  She underwent MRI C- and T-spines which demonstrated demyelinating lesion without enhancement but some swelling at C3 in the posterior aspect at the midline, as well as 2 additional subtle foci at T4 and T10.  She was given IV methylprednisolone 1 g on July 3 and a second dose on July 4 and was discharged home with Prednisone 1250 mg orally daily for 3 days and completed this course.   Since discharge she has been doing stable and actually improving, she estimates that she is now up to about 98% of her baseline.  Her biggest issue is with mild hand weakness particularly grasp although this is not limiting her daily activities.  She still has some trouble typing especially getting her left pinky to follow her to commands.  She met with physical therapy once who deemed that she did not need anymore follow-up she was not weak or ataxic.  She does however deal some mild foot drop especially when she is fatigued, does not use a brace.   Janneth really does not like needles and would like to avoid using injectable medications.  She is planning on taking vitamin D supplementation.  She does not smoke, although she was exposed to this in her childhood secondhand.  She currently lives with her boyfriend and does not plan on having any children for the next few years, but perhaps within  "10 years or so.  Family history is positive for rheumatoid arthritis in her paternal aunt and her father has psoriasis.    ASSESSMENT AND PLAN:  23F w/ no significant PMH presenting after hospital admission for L hemiparesis with evidence for demyelination on MRI and CSF labs. Clinical history and lab/exam data are consistent with Relapsing Remitting Multiple Sclerosis. She's nearly back to baseline, estmiates about 98% of normal. We discussed options for DMT including injectables and orals, and given her extreme aversion to injections, our conversation finalized on starting Tecfidera. We discussed monitoring requirements and will get the paperwork started. We also discussed monitoring for new symptoms including visual disturbances or hemisensory or hemiparetic changes.    Plan:  - we will start paperwork for Tecfidera  - labs today: CBC w/ differential, BENSON Virus Ab, and Vitamin D level  - recommended Vitamin D supplementation    Patient was seen and discussed with Dr. Dee.    Bhupendra Wilkes MD  Neurology PGY4  [facility]      PHYSICAL EXAMINATION:  Vitals: /78 (BP Location: Left arm, Patient Position: Chair, Cuff Size: Adult Regular)   Pulse 87   Ht 1.575 m (5' 2\")   Wt 56.8 kg (125 lb 3.2 oz)   LMP 07/02/2019 (Exact Date)   BMI 22.90 kg/m    General: appears well, accompanied by sister and mother  Neuro:   Mental status: alert; language is fluent with intact comprehension   Cranial nerves: PERRL, no APD, no BRYANT, EOMI with intact smooth pursuit, full visual fields with double simultaneous stimulation, fundi including optic disk within normal limits and no evidence of pallor, no facial asymmetry or ptosis, hearing intact to conversation, no hypophonia, no dysarthria   Motor: normal tone in all limbs with some low tone in the L ankle and minor inversion at rest; no abnormal movements   RIGHT LEFT    5 5   FDI 5 5-   Wrist flexion 5 5   Wrist extension 5 5   Biceps 5 5-   Triceps " 5 5   Deltoid 5 5   Hip flexion 5 5-   Knee extension 5 5   Knee flexion 5 5   Dorsiflexion 5 5   Plantar flexion 5 5    Reflexes: Babinski absent bilaterally, Murphy present bilaterally   RIGHT LEFT   Brachioradialis Brisk Brisk w/ a little spread   Biceps Brisk Brisk   Triceps Brisk Brisk   Patellar Brisk Brisk with mild cross-adduction   Achilles Brisk Brisk with 1-2 beats clonus    Sensory: Intact to light touch and pin prick in all extremities. Romberg exam within normal limits.   Coordination: L hand slower than R hand with finger tapping and rapid alternating movements; L foot slower than R with toe tapping   Gait: normal gait and station, normal tandem gait      INVESTIGATIONS:  MRI Brain, C- and T-spines were personally reviewed with Dr. Dee and the patient with her family.      REVIEW OF SYSTEMS: 12-point RoS negative except as per HPI.      ALLERGIES:  Allergies   Allergen Reactions     Pollen [Pollen Extract] Itching     Ragweed       MEDICATIONS:  Current Outpatient Medications   Medication Sig Dispense Refill     albuterol (PROVENTIL HFA) 108 (90 Base) MCG/ACT inhaler daily as needed       norgestimate-ethinyl estradiol (ORTHO-CYCLEN/SPRINTEC) 0.25-35 MG-MCG tablet daily         PAST MEDICAL HISTORY:  Past Medical History:   Diagnosis Date     Allergic rhinitis, cause unspecified     Allergic rhinitis     Methicillin susceptible Staphylococcus aureus in conditions classified elsewhere and of unspecified site 6/25/02     Unspecified asthma(493.90) 1/11/05    exacerbation of asthma     Unspecified conductive hearing loss      Unspecified otitis media childhood    Recurrent otitis       PAST SURGICAL HISTORY:  Past Surgical History:   Procedure Laterality Date     HC CREATE EARDRUM OPENING,GEN ANESTH  1999, 2001    Myringotomy w tubes     HC CREATE EARDRUM OPENING,GEN ANESTH  4/7/08    RT     HC NASAL ENDOSCOPY, DIAGNOSTIC  4/7/08     HC TYMPANOPLASTY W/O MASTOID, INIT/REV W/O OSS CHAIN RECONST   10/16/06    LT     HC TYMPANOPLASTY W/O MASTOID, INIT/REV W/O OSS CHAIN RECONST  10/15/07    RT       FAMILY HISTORY:  Family History   Problem Relation Age of Onset     Allergies Father         Tetanus and penicillin     Obesity Father      Psoriasis Father      Hypertension Maternal Grandmother      Arthritis Maternal Grandmother      Thyroid Disease Maternal Grandmother         Hypothyroidism     C.A.D. Maternal Grandfather      Hypertension Paternal Grandmother      Arthritis Paternal Grandmother      Obesity Paternal Grandmother      Gastrointestinal Disease Paternal Grandfather         pocketing in intestines     Rheumatoid Arthritis Paternal Aunt        SOCIAL HISTORY:  Social History     Socioeconomic History     Marital status: Single     Spouse name: Not on file     Number of children: Not on file     Years of education: Not on file     Highest education level: Not on file   Occupational History     Occupation: student   Social Needs     Financial resource strain: Not on file     Food insecurity:     Worry: Not on file     Inability: Not on file     Transportation needs:     Medical: Not on file     Non-medical: Not on file   Tobacco Use     Smoking status: Never Smoker     Smokeless tobacco: Never Used     Tobacco comment: second hand smoke in the home   Substance and Sexual Activity     Alcohol use: No     Drug use: No     Sexual activity: Never   Lifestyle     Physical activity:     Days per week: Not on file     Minutes per session: Not on file     Stress: Not on file   Relationships     Social connections:     Talks on phone: Not on file     Gets together: Not on file     Attends Scientologist service: Not on file     Active member of club or organization: Not on file     Attends meetings of clubs or organizations: Not on file     Relationship status: Not on file     Intimate partner violence:     Fear of current or ex partner: Not on file     Emotionally abused: Not on file     Physically abused: Not on  file     Forced sexual activity: Not on file   Other Topics Concern      Service No     Blood Transfusions No     Caffeine Concern Yes     Comment: pop daily     Occupational Exposure Not Asked     Hobby Hazards Not Asked     Sleep Concern Not Asked     Stress Concern Not Asked     Weight Concern Not Asked     Special Diet Not Asked     Back Care Not Asked     Exercise No     Bike Helmet Not Asked     Seat Belt Not Asked     Self-Exams No   Social History Narrative     Not on file     This note was written with the assistance of the Dragon voice-dictation technology software. The final document, although reviewed, may contain errors. For corrections, please contact the office.     I saw and examined this patient, and have read and edited the resident's note.  I agree with the findings, assessment, and plan.  I spent 63 minutes with the patient, greater than 50% in counseling and coordination of care and reviewing her MRIs together.  Janneth had an initial demyelinting episode of left hemiparesis, with MRIs showing evidence of dissemination in both space and time.  There was a large enhancing lesion (symptomatic) in the R thalamus and posterior limb of the internal capsule, as we as non-enhancing lesions in the right and left frontoparietal intermediate white matter, and in the cervical spinal cord.  CSF shows intrathecal IgG synthesis.  Aqp-4 and MOG antibodies are negative.  She meets diagnostic criteria for MS.  I reviewed MS in detail, including its typical presentation, course, prognosis, and management.  I discussed MS immunotherapy, including what it does and does not do, and went over the options.  I usually recommend glatiramer acetate for initial treatment due to its unparalelled safety profile, but she is so needle-phobic that GA is not a feasible option for her, and we will proceed with dimethyl fumarate.  She will f/u with Ms. Rodney in 3 months, and with me 6 months after that (with repeat brain  MRI at that time).    Lewis Dee MD

## 2019-07-16 NOTE — LETTER
2019      RE: Janneth Lucio  [address] [facility] [address]     DEPARTMENT OF NEUROLOGY  MULTIPLE SCLEROSIS CLINIC  Patient Name: Janneth Lucio  MRN: [id number]  : 1995  Date of Clinic Visit: 2019  Primary Care Provider: Dianne Worley  Referring Provider: Referred Self    CHIEF COMPLAINT: Hospital follow-up for left hemiparesis with suspected diagnosis of multiple sclerosis      HISTORY OF PRESENT ILLNESS:  Janneth Lucio is a 23 year old female presenting for hospital follow-up with suspected MS.   Janneth first developed symptoms early morning on .  She woke up at 4 AM, she thinks she was prompted by the hemiparesis.  When she woke up she realized that her left arm would not move at all, her left leg was able to swing over the bed when she tried to stand up she collapsed.  This woke her boyfriend up and when he tried to speak to her he noticed that she was very dysarthric with a left facial droop.  Her symptoms persisted but she was soon able to walk within about an hour.  She denies any sensory disturbance including numbness, tingling, or pain during this time.  This persisted for a few hours and then ultimately went to the emergency department.  That initial assessments, they thought that she had a hemiplegic migraine that she suffers from migraines.  She has never had a migraine like this and in fact during that day had only very mild cephalgia.  They gave her IV fluids, metoclopramide, Toradol and she actually had symptom improvement nearly back to baseline.  She went home and took a nap for a few hours then woke up and realized that the left hemiparesis was back.   The next day with her symptoms persistent, she called her primary care provider.  Her primary care ordered an MRI brain but she was unable to get it until 10 days after symptom onset.  Her primary care also got her a Rollator walker which she used intermittently.  She got the MRI done on   and was called back by her primary care doc later that day to go to the [place] emergency room.  The MRI demonstrated 3 nonenhancing T2 hyperintense lesions in the cerebral cortical white matter, none in the cerebellum, and one enhancing lesion is very large and extending down to the brainstem that did enhance.   She presented to [facility] emergency room in the early morning on July 3.  At that point her presentation still demonstrated 4/5 weakness throuhgout the L side and weak L eyelid closure.  They admitted her and performed a lumbar puncture; CSF demonstrated 10 oligoclonal bands, 7 white blood cells, 97% lymphocytes, glucose 57, protein 49.  HIV nonreactive, normal ACE, CRP within normal limits.  She underwent MRI C- and T-spines which demonstrated demyelinating lesion without enhancement but some swelling at C3 in the posterior aspect at the midline, as well as 2 additional subtle foci at T4 and T10.  She was given IV methylprednisolone 1 g on July 3 and a second dose on July 4 and was discharged home with Prednisone 1250 mg orally daily for 3 days and completed this course.   Since discharge she has been doing stable and actually improving, she estimates that she is now up to about 98% of her baseline.  Her biggest issue is with mild hand weakness particularly grasp although this is not limiting her daily activities.  She still has some trouble typing especially getting her left pinky to follow her to commands.  She met with physical therapy once who deemed that she did not need anymore follow-up she was not weak or ataxic.  She does however deal some mild foot drop especially when she is fatigued, does not use a brace.   Janneth really does not like needles and would like to avoid using injectable medications.  She is planning on taking vitamin D supplementation.  She does not smoke, although she was exposed to this in her childhood secondhand.  She currently lives with her boyfriend and does  "not plan on having any children for the next few years, but perhaps within 10 years or so.  Family history is positive for rheumatoid arthritis in her paternal aunt and her father has psoriasis.    ASSESSMENT AND PLAN:  23F w/ no significant PMH presenting after hospital admission for L hemiparesis with evidence for demyelination on MRI and CSF labs. Clinical history and lab/exam data are consistent with Relapsing Remitting Multiple Sclerosis. She's nearly back to baseline, estmiates about 98% of normal. We discussed options for DMT including injectables and orals, and given her extreme aversion to injections, our conversation finalized on starting Tecfidera. We discussed monitoring requirements and will get the paperwork started. We also discussed monitoring for new symptoms including visual disturbances or hemisensory or hemiparetic changes.    Plan:  - we will start paperwork for Tecfidera  - labs today: CBC w/ differential, BENSON Virus Ab, and Vitamin D level  - recommended Vitamin D supplementation    Patient was seen and discussed with Dr. Dee.    Bhupendra Wilkes MD  Neurology PGY4  [facility]      PHYSICAL EXAMINATION:  Vitals: /78 (BP Location: Left arm, Patient Position: Chair, Cuff Size: Adult Regular)   Pulse 87   Ht 1.575 m (5' 2\")   Wt 56.8 kg (125 lb 3.2 oz)   LMP 07/02/2019 (Exact Date)   BMI 22.90 kg/m     General: appears well, accompanied by sister and mother  Neuro:   Mental status: alert; language is fluent with intact comprehension   Cranial nerves: PERRL, no APD, no BRYANT, EOMI with intact smooth pursuit, full visual fields with double simultaneous stimulation, fundi including optic disk within normal limits and no evidence of pallor, no facial asymmetry or ptosis, hearing intact to conversation, no hypophonia, no dysarthria   Motor: normal tone in all limbs with some low tone in the L ankle and minor inversion at rest; no abnormal movements   RIGHT LEFT    5 5 "   FDI 5 5-   Wrist flexion 5 5   Wrist extension 5 5   Biceps 5 5-   Triceps 5 5   Deltoid 5 5   Hip flexion 5 5-   Knee extension 5 5   Knee flexion 5 5   Dorsiflexion 5 5   Plantar flexion 5 5    Reflexes: Babinski absent bilaterally, Murphy present bilaterally   RIGHT LEFT   Brachioradialis Brisk Brisk w/ a little spread   Biceps Brisk Brisk   Triceps Brisk Brisk   Patellar Brisk Brisk with mild cross-adduction   Achilles Brisk Brisk with 1-2 beats clonus    Sensory: Intact to light touch and pin prick in all extremities. Romberg exam within normal limits.   Coordination: L hand slower than R hand with finger tapping and rapid alternating movements; L foot slower than R with toe tapping   Gait: normal gait and station, normal tandem gait      INVESTIGATIONS:  MRI Brain, C- and T-spines were personally reviewed with Dr. Dee and the patient with her family.      REVIEW OF SYSTEMS: 12-point RoS negative except as per HPI.      ALLERGIES:  Allergies   Allergen Reactions     Pollen [Pollen Extract] Itching     Ragweed       MEDICATIONS:  Current Outpatient Medications   Medication Sig Dispense Refill     albuterol (PROVENTIL HFA) 108 (90 Base) MCG/ACT inhaler daily as needed       norgestimate-ethinyl estradiol (ORTHO-CYCLEN/SPRINTEC) 0.25-35 MG-MCG tablet daily         PAST MEDICAL HISTORY:  Past Medical History:   Diagnosis Date     Allergic rhinitis, cause unspecified     Allergic rhinitis     Methicillin susceptible Staphylococcus aureus in conditions classified elsewhere and of unspecified site 6/25/02     Unspecified asthma(493.90) 1/11/05    exacerbation of asthma     Unspecified conductive hearing loss      Unspecified otitis media childhood    Recurrent otitis       PAST SURGICAL HISTORY:  Past Surgical History:   Procedure Laterality Date     HC CREATE EARDRUM OPENING,GEN ANESTH  1999, 2001    Myringotomy w tubes     HC CREATE EARDRUM OPENING,GEN ANESTH  4/7/08    RT     HC NASAL ENDOSCOPY, DIAGNOSTIC   4/7/08     HC TYMPANOPLASTY W/O MASTOID, INIT/REV W/O OSS CHAIN RECONST  10/16/06    LT     HC TYMPANOPLASTY W/O MASTOID, INIT/REV W/O OSS CHAIN RECONST  10/15/07    RT       FAMILY HISTORY:  Family History   Problem Relation Age of Onset     Allergies Father         Tetanus and penicillin     Obesity Father      Psoriasis Father      Hypertension Maternal Grandmother      Arthritis Maternal Grandmother      Thyroid Disease Maternal Grandmother         Hypothyroidism     C.A.D. Maternal Grandfather      Hypertension Paternal Grandmother      Arthritis Paternal Grandmother      Obesity Paternal Grandmother      Gastrointestinal Disease Paternal Grandfather         pocketing in intestines     Rheumatoid Arthritis Paternal Aunt        SOCIAL HISTORY:  Social History     Socioeconomic History     Marital status: Single     Spouse name: Not on file     Number of children: Not on file     Years of education: Not on file     Highest education level: Not on file   Occupational History     Occupation: student   Social Needs     Financial resource strain: Not on file     Food insecurity:     Worry: Not on file     Inability: Not on file     Transportation needs:     Medical: Not on file     Non-medical: Not on file   Tobacco Use     Smoking status: Never Smoker     Smokeless tobacco: Never Used     Tobacco comment: second hand smoke in the home   Substance and Sexual Activity     Alcohol use: No     Drug use: No     Sexual activity: Never   Lifestyle     Physical activity:     Days per week: Not on file     Minutes per session: Not on file     Stress: Not on file   Relationships     Social connections:     Talks on phone: Not on file     Gets together: Not on file     Attends Faith service: Not on file     Active member of club or organization: Not on file     Attends meetings of clubs or organizations: Not on file     Relationship status: Not on file     Intimate partner violence:     Fear of current or ex partner: Not  on file     Emotionally abused: Not on file     Physically abused: Not on file     Forced sexual activity: Not on file   Other Topics Concern      Service No     Blood Transfusions No     Caffeine Concern Yes     Comment: pop daily     Occupational Exposure Not Asked     Hobby Hazards Not Asked     Sleep Concern Not Asked     Stress Concern Not Asked     Weight Concern Not Asked     Special Diet Not Asked     Back Care Not Asked     Exercise No     Bike Helmet Not Asked     Seat Belt Not Asked     Self-Exams No   Social History Narrative     Not on file     This note was written with the assistance of the Dragon voice-dictation technology software. The final document, although reviewed, may contain errors. For corrections, please contact the office.     I saw and examined this patient, and have read and edited the resident's note.  I agree with the findings, assessment, and plan.  I spent 63 minutes with the patient, greater than 50% in counseling and coordination of care and reviewing her MRIs together.  Janneth had an initial demyelinting episode of left hemiparesis, with MRIs showing evidence of dissemination in both space and time.  There was a large enhancing lesion (symptomatic) in the R thalamus and posterior limb of the internal capsule, as we as non-enhancing lesions in the right and left frontoparietal intermediate white matter, and in the cervical spinal cord.  CSF shows intrathecal IgG synthesis.  Aqp-4 and MOG antibodies are negative.  She meets diagnostic criteria for MS.  I reviewed MS in detail, including its typical presentation, course, prognosis, and management.  I discussed MS immunotherapy, including what it does and does not do, and went over the options.  I usually recommend glatiramer acetate for initial treatment due to its unparalelled safety profile, but she is so needle-phobic that GA is not a feasible option for her, and we will proceed with dimethyl fumarate.  She will f/u with  Ms. Rodney in 3 months, and with me 6 months after that (with repeat brain MRI at that time).    Lewis Dee MD

## 2019-07-17 ENCOUNTER — TELEPHONE (OUTPATIENT)
Dept: NEUROLOGY | Facility: CLINIC | Age: 24
End: 2019-07-17

## 2019-07-17 LAB — DEPRECATED CALCIDIOL+CALCIFEROL SERPL-MC: 43 UG/L (ref 20–75)

## 2019-07-17 NOTE — TELEPHONE ENCOUNTER
Prior Authorization Specialty Medication Request    Medication/Dose: Tecfidera titraion pack and maintenace Rx  ICD code (if different than what is on RX):  Relapsing Remitting Multiple Sclerosis G35  Previously Tried and Failed:  none    Important Lab Values: na  Rationale: initiation of disease modifying therapy for demyelinating disease. Please approve.     Insurance Name: na  Insurance ID: na  Insurance Phone Number: na    Pharmacy Information (if different than what is on RX)  Name:  na  Phone:  na

## 2019-07-17 NOTE — TELEPHONE ENCOUNTER
Patient was seen by Dr. Dee yesterday and the decision was made to start her on Tecfidera. Baseline labs complete/pending. Start form signed by MD and patient. This has been faxed to the pharmacy liaison. PA submitted in separate encounter.

## 2019-07-18 NOTE — TELEPHONE ENCOUNTER
PA Initiation    Medication: Tecfidera titraion pack and maintenace Rx (PENDING)  Insurance Company: [employer] - Phone [phone] Fax [phone]  Pharmacy Filling the Rx: [facility] - [place], [place] - [facility] [address]  Filling Pharmacy Phone:    Filling Pharmacy Fax:    Start Date: 7/18/2019

## 2019-07-19 NOTE — TELEPHONE ENCOUNTER
Prior Authorization Approval    Authorization Effective Date: 7/18/2019  Authorization Expiration Date: 7/18/2021  Medication: Tecfidera titraion pack and maintenace Rx (APPROVED)  Approved Dose/Quantity: 30 days  Reference #: CMM KEY: [id number]   Insurance Company: [employer] - Phone [phone] Fax [phone]  Expected CoPay:       CoPay Card Available:      Foundation Assistance Needed:    Which Pharmacy is filling the prescription (Not needed for infusion/clinic administered): [facility] - [place], [place] - [address] [place] [address]  Pharmacy Notified: Yes  Patient Notified: Yes          Start form faxed to [facility] for review:

## 2019-07-29 LAB — LAB SCANNED RESULT: ABNORMAL

## 2019-09-25 ENCOUNTER — OFFICE VISIT - RIVER FALLS (OUTPATIENT)
Dept: FAMILY MEDICINE | Facility: CLINIC | Age: 24
End: 2019-09-25
Payer: COMMERCIAL

## 2019-10-14 ENCOUNTER — MYC MEDICAL ADVICE (OUTPATIENT)
Dept: NEUROLOGY | Facility: CLINIC | Age: 24
End: 2019-10-14

## 2019-10-14 ENCOUNTER — OFFICE VISIT (OUTPATIENT)
Dept: NEUROLOGY | Facility: CLINIC | Age: 24
End: 2019-10-14
Attending: PSYCHIATRY & NEUROLOGY
Payer: COMMERCIAL

## 2019-10-14 VITALS
HEART RATE: 100 BPM | WEIGHT: 124.1 LBS | SYSTOLIC BLOOD PRESSURE: 121 MMHG | DIASTOLIC BLOOD PRESSURE: 75 MMHG | HEIGHT: 62 IN | BODY MASS INDEX: 22.84 KG/M2

## 2019-10-14 DIAGNOSIS — G37.9 DEMYELINATING DISEASE OF CENTRAL NERVOUS SYSTEM (H): Primary | ICD-10-CM

## 2019-10-14 RX ORDER — DIMETHYL FUMARATE 240 MG/1
CAPSULE ORAL 2 TIMES DAILY
COMMUNITY
Start: 2019-09-10 | End: 2020-07-24

## 2019-10-14 ASSESSMENT — PAIN SCALES - GENERAL: PAINLEVEL: NO PAIN (0)

## 2019-10-14 ASSESSMENT — MIFFLIN-ST. JEOR: SCORE: 1271.16

## 2019-10-14 NOTE — PROGRESS NOTES
[facility] OUTPATIENT MULTIPLE SCLEROSIS CLINIC VISIT NOTE      REASON FOR VISIT: Janneth is a 24 year old right handed female who presents to the clinic today for regularly scheduled follow up of recent diagnosis of Relapsing Remitting MS. She was most recently seen by Dr. Dee on 7/16/2019. She presents to the evaluation alone.     HISTORY OF PRESENT ILLNESS: In 6/2019, patient had had an initial demyelinting episode of left hemiparesis, with MRIs showing evidence of dissemination in both space and time. There was a large enhancing lesion (symptomatic) in the R thalamus and posterior limb of the internal capsule, as we as non-enhancing lesions in the right and left frontoparietal intermediate white matter, and in the cervical spinal cord. CSF studies showed intrathecal IgG synthesis. She had a negative Aquaporin- 4 and MOG antibodies. Patient was diagnosed with Multiple Sclerosis and was started on Tecfidera during her initial visit with Dr. Dee on 7/16/2019.      INTERVAL HISTORY SINCE LAST VISIT: Most recent visit with Dr. Dee on 7/16/2018.  She started Tecfidera soon after this visit. Currently taking 240 mg twice daily.  Initially she had some abdominal discomfort with abdominal pain, diarrhea.  This eventually got better.  Overall tolerating this medication well.  Not missing many doses.  General health has been stable.    Since 8/16/2019, patient had intermittent cramping in her left leg and left arm which last for 5 seconds or so.  This is not painful, but more uncomfortable.  Today she denies any of the symptoms.  In regards to her residual symptoms, patient reports a very mild weakness in her left hand.    She denies any changes in her vision, speech, swallowing, hearing, balance.  No bladder or bowel issues.  No major issues with mood, fatigue.    PAST MEDICAL/SURGICAL HISTORY:   1.  Relapsing remitting multiple sclerosis  2.  Tympanoplasty    FAMILY HISTORY: Negative for  "multiple sclerosis.    SOCIAL HISTORY: She has a boyfriend.  No children.  She works at [employer] and also does some [employer] job.  She denies smoking and recreational drug use.  Reports occasional alcohol use.    Vit D: She currently uses 5000 international units daily.  Her most recent level from 7/2019 was 43.    PHYSICAL EXAMINATION:   VITAL SIGNS: /75 (BP Location: Right arm, Patient Position: Chair, Cuff Size: Adult Regular)   Pulse 100   Ht 1.575 m (5' 2\")   Wt 56.3 kg (124 lb 1.6 oz)   BMI 22.70 kg/m     MENTAL STATUS: Alert,awake and  oriented times four.   CRANIAL NERVES: Visual fields are full to confrontation. The pupils are equal, round and react to light and there is no Mike Jn pupil. Funduscopic examination demonstrates no optic pallor, papilledema or retinal hemorrhage/exudate. Extraocular movements are intact with no internuclear ophthalmoplegia. No nystagmus. Facial strength and sensation are normal. Hearing is normal. Palate elevation and tongue protrusion are normal.   POWER: Strength is as follows in the following muscles/groups (Right/Left,MRC scale): Shoulder abduction 5/5, elbow flexion 5/5, elbow extension 5/5, wrist extension 5/5-, digit extension 5/5-, digit flexion 5/5, Hip flexion 5/5, knee extension 5/5, knee flexion 5/5, foot dorsiflexion 5/5.   SENSORY: Intact to light touch.   REFLEXES: Reflexes are normal, present and symmetric in her upper extremities. Bilateral lower extremity reflexes are asymmetric, left knee reflex slightly stronger than the right.   MOTOR/CEREBELLAR: There are no tremors, myoclonus or other abnormal movements. Tone is within normal limits in the limbs. There is no appendicular ataxia on finger-to-nose testing and rapid alternating movements are normal in the extremities. There is no pronator drift. Romberg negative.   GAIT: Gait is  narrow-based and steady and the patient is able to walk on heels, toes and in tandem without " difficulty.      ASSESSMENT/ PLAN:   1. Relapsing Remitting MS, clinically stable on Tecfidera:  initial demyelinting episode of left hemiparesis, with MRIs showing evidence of dissemination in both space and time in 6/2019. Started on Tecfidera in 7/2019.  Overall tolerating this new medication well without any side effects.  She will have a follow-up MRI brain in 6 months and will follow-up with Dr. Dee afterwards.    2.  Vitamin D level: She currently uses 5000 international units daily.  Her most recent level from 7/2019 was 43. She will continue the same dose for now.     3.  Contraception: Patient is sexually active.  She currently uses oral contraceptive pills and her partner uses condoms for contraception.    4. Please contact us with questions or concerns.     Natalia Rodney CNP  [facility]          I spent 35 minutes with this patient today, greater than 50% of which was spent in counseling and coordination of care.     (Chart documentation was completed in part with Dragon voice-recognition software. Even though reviewed, some grammatical, spelling, and word errors may remain.)

## 2019-10-14 NOTE — PATIENT INSTRUCTIONS
1. Continue Tecfidera as ordered.     2. Continue Vit D 5000 international unit(s) daily.     3. F/u MRI brain in 6 months.    4. F/u with Dr. Dee after imaging.

## 2019-10-14 NOTE — LETTER
10/14/2019       RE: Janneth Lucio  [address] [facility] [address]     Dear Colleague,    Thank you for referring your patient, Janneth Lucio, to the [facility] MULTIPLE SCLEROSIS at [facility]. Please see a copy of my visit note below.    [facility] OUTPATIENT MULTIPLE SCLEROSIS CLINIC VISIT NOTE      REASON FOR VISIT: Janneth is a 24 year old right handed female who presents to the clinic today for regularly scheduled follow up of recent diagnosis of Relapsing Remitting MS. She was most recently seen by Dr. Dee on 7/16/2019. She presents to the evaluation alone.     HISTORY OF PRESENT ILLNESS: In 6/2019, patient had had an initial demyelinting episode of left hemiparesis, with MRIs showing evidence of dissemination in both space and time. There was a large enhancing lesion (symptomatic) in the R thalamus and posterior limb of the internal capsule, as we as non-enhancing lesions in the right and left frontoparietal intermediate white matter, and in the cervical spinal cord. CSF studies showed intrathecal IgG synthesis. She had a negative Aquaporin- 4 and MOG antibodies. Patient was diagnosed with Multiple Sclerosis and was started on Tecfidera during her initial visit with Dr. Dee on 7/16/2019.      INTERVAL HISTORY SINCE LAST VISIT: Most recent visit with Dr. Dee on 7/16/2018.  She started Tecfidera soon after this visit. Currently taking 240 mg twice daily.  Initially she had some abdominal discomfort with abdominal pain, diarrhea.  This eventually got better.  Overall tolerating this medication well.  Not missing many doses.  General health has been stable.    Since 8/16/2019, patient had intermittent cramping in her left leg and left arm which last for 5 seconds or so.  This is not painful, but more uncomfortable.  Today she denies any of the symptoms.  In regards to her residual symptoms, patient reports a very mild weakness in  "her left hand.    She denies any changes in her vision, speech, swallowing, hearing, balance.  No bladder or bowel issues.  No major issues with mood, fatigue.    PAST MEDICAL/SURGICAL HISTORY:   1.  Relapsing remitting multiple sclerosis  2.  Tympanoplasty    FAMILY HISTORY: Negative for multiple sclerosis.    SOCIAL HISTORY: She has a boyfriend.  No children.  She works at [employer] and also does some [employer] job.  She denies smoking and recreational drug use.  Reports occasional alcohol use.    Vit D: She currently uses 5000 international units daily.  Her most recent level from 7/2019 was 43.    PHYSICAL EXAMINATION:   VITAL SIGNS: /75 (BP Location: Right arm, Patient Position: Chair, Cuff Size: Adult Regular)   Pulse 100   Ht 1.575 m (5' 2\")   Wt 56.3 kg (124 lb 1.6 oz)   BMI 22.70 kg/m      MENTAL STATUS: Alert,awake and  oriented times four.   CRANIAL NERVES: Visual fields are full to confrontation. The pupils are equal, round and react to light and there is no Mike Jn pupil. Funduscopic examination demonstrates no optic pallor, papilledema or retinal hemorrhage/exudate. Extraocular movements are intact with no internuclear ophthalmoplegia. No nystagmus. Facial strength and sensation are normal. Hearing is normal. Palate elevation and tongue protrusion are normal.   POWER: Strength is as follows in the following muscles/groups (Right/Left,MRC scale): Shoulder abduction 5/5, elbow flexion 5/5, elbow extension 5/5, wrist extension 5/5-, digit extension 5/5-, digit flexion 5/5, Hip flexion 5/5, knee extension 5/5, knee flexion 5/5, foot dorsiflexion 5/5.   SENSORY: Intact to light touch.   REFLEXES: Reflexes are normal, present and symmetric in her upper extremities. Bilateral lower extremity reflexes are asymmetric, left knee reflex slightly stronger than the right.   MOTOR/CEREBELLAR: There are no tremors, myoclonus or other abnormal movements. Tone is within normal limits in the limbs. There is no " appendicular ataxia on finger-to-nose testing and rapid alternating movements are normal in the extremities. There is no pronator drift. Romberg negative.   GAIT: Gait is  narrow-based and steady and the patient is able to walk on heels, toes and in tandem without difficulty.      ASSESSMENT/ PLAN:   1. Relapsing Remitting MS, clinically stable on Tecfidera:  initial demyelinting episode of left hemiparesis, with MRIs showing evidence of dissemination in both space and time in 6/2019. Started on Tecfidera in 7/2019.  Overall tolerating this new medication well without any side effects.  She will have a follow-up MRI brain in 6 months and will follow-up with Dr. Dee afterwards.    2.  Vitamin D level: She currently uses 5000 international units daily.  Her most recent level from 7/2019 was 43. She will continue the same dose for now.     3.  Contraception: Patient is sexually active.  She currently uses oral contraceptive pills and her partner uses condoms for contraception.    4. Please contact us with questions or concerns.     Natalia Rodney CNP  [facility]    I spent 35 minutes with this patient today, greater than 50% of which was spent in counseling and coordination of care.     (Chart documentation was completed in part with Dragon voice-recognition software. Even though reviewed, some grammatical, spelling, and word errors may remain.)

## 2019-10-31 ENCOUNTER — OFFICE VISIT - RIVER FALLS (OUTPATIENT)
Dept: FAMILY MEDICINE | Facility: CLINIC | Age: 24
End: 2019-10-31
Payer: COMMERCIAL

## 2019-10-31 ASSESSMENT — MIFFLIN-ST. JEOR: SCORE: 1271.6

## 2019-11-06 ENCOUNTER — COMMUNICATION - RIVER FALLS (OUTPATIENT)
Dept: FAMILY MEDICINE | Facility: CLINIC | Age: 24
End: 2019-11-06
Payer: COMMERCIAL

## 2019-11-06 ENCOUNTER — TRANSFERRED RECORDS (OUTPATIENT)
Dept: HEALTH INFORMATION MANAGEMENT | Facility: CLINIC | Age: 24
End: 2019-11-06

## 2019-11-06 LAB — HPV ABSTRACT: NORMAL

## 2019-11-07 ENCOUNTER — HEALTH MAINTENANCE LETTER (OUTPATIENT)
Age: 24
End: 2019-11-07

## 2019-12-06 ENCOUNTER — OFFICE VISIT - RIVER FALLS (OUTPATIENT)
Dept: FAMILY MEDICINE | Facility: CLINIC | Age: 24
End: 2019-12-06
Payer: COMMERCIAL

## 2019-12-06 LAB — HCG UR QL: NEGATIVE

## 2019-12-06 ASSESSMENT — MIFFLIN-ST. JEOR: SCORE: 1275.23

## 2020-01-27 ENCOUNTER — OFFICE VISIT - RIVER FALLS (OUTPATIENT)
Dept: FAMILY MEDICINE | Facility: CLINIC | Age: 25
End: 2020-01-27
Payer: COMMERCIAL

## 2020-01-27 LAB
ERYTHROCYTE [DISTWIDTH] IN BLOOD BY AUTOMATED COUNT: 13.4 % (ref 11.5–15.5)
HCG UR QL: NEGATIVE
HCT VFR BLD AUTO: 42.4 % (ref 33–51)
HGB BLD-MCNC: 14.3 G/DL (ref 12–16)
MCH RBC QN AUTO: 27.5 PG (ref 26–34)
MCHC RBC AUTO-ENTMCNC: 33.7 G/DL (ref 32–36)
MCV RBC AUTO: 82 FL (ref 80–100)
PLATELET # BLD AUTO: 329 10*3/UL (ref 140–440)
PMV BLD: 9.4 FL (ref 6.5–11)
RBC # BLD AUTO: 5.2 10*6/UL (ref 4–5.2)
WBC # BLD AUTO: 8.3 10*3/UL (ref 4.5–11)

## 2020-01-27 ASSESSMENT — MIFFLIN-ST. JEOR: SCORE: 1267.06

## 2020-02-07 ENCOUNTER — COMMUNICATION - RIVER FALLS (OUTPATIENT)
Dept: FAMILY MEDICINE | Facility: CLINIC | Age: 25
End: 2020-02-07
Payer: COMMERCIAL

## 2020-03-09 ENCOUNTER — OFFICE VISIT - RIVER FALLS (OUTPATIENT)
Dept: FAMILY MEDICINE | Facility: CLINIC | Age: 25
End: 2020-03-09
Payer: COMMERCIAL

## 2020-03-09 ASSESSMENT — MIFFLIN-ST. JEOR: SCORE: 1305.16

## 2020-04-15 ENCOUNTER — MYC MEDICAL ADVICE (OUTPATIENT)
Dept: NEUROLOGY | Facility: CLINIC | Age: 25
End: 2020-04-15

## 2020-04-16 NOTE — TELEPHONE ENCOUNTER
It's fine to reschedule for when she can get an MRI, but it should be pretty soon (within the next 2-3 months).  How about we schedule her some time in late June, with MRI to coincide with the visit?

## 2020-04-20 NOTE — TELEPHONE ENCOUNTER
No on the double booking.  But we can do a video visit (in August, or before if I have openings) so she doesn't have to come back again.

## 2020-06-16 ENCOUNTER — ANCILLARY PROCEDURE (OUTPATIENT)
Dept: MRI IMAGING | Facility: CLINIC | Age: 25
End: 2020-06-16
Attending: NURSE PRACTITIONER
Payer: COMMERCIAL

## 2020-06-16 DIAGNOSIS — G37.9 DEMYELINATING DISEASE OF CENTRAL NERVOUS SYSTEM (H): ICD-10-CM

## 2020-06-16 RX ORDER — GADOBUTROL 604.72 MG/ML
7.5 INJECTION INTRAVENOUS ONCE
Status: COMPLETED | OUTPATIENT
Start: 2020-06-16 | End: 2020-06-16

## 2020-06-16 RX ADMIN — GADOBUTROL 6 ML: 604.72 INJECTION INTRAVENOUS at 13:12

## 2020-06-16 NOTE — DISCHARGE INSTRUCTIONS
MRI Contrast Discharge Instructions    The IV contrast you received today will pass out of your body in your  urine. This will happen in the next 24 hours. You will not feel this process.  Your urine will not change color.    Drink at least 4 extra glasses of water or juice today (unless your doctor  has restricted your fluids). This reduces the stress on your kidneys.  You may take your regular medicines.    If you are on dialysis: It is best to have dialysis today.    If you have a reaction: Most reactions happen right away. If you have  any new symptoms after leaving the hospital (such as hives or swelling),  call your hospital at the correct number below. Or call your family doctor.  If you have breathing distress or wheezing, call 911.    Special instructions: ***    I have read and understand the above information.    Signature:______________________________________ Date:___________    Staff:__________________________________________ Date:___________     Time:__________    [facility] Departments:    ___[facility]: [phone]  ___[facility]: [phone]  ___[facility]: [phone] ___[facility]: [phone]  ___[facility]: [phone]  ___[facility]: [phone]  ___[phone]  ___[facility]: [phone]  ___[phone]

## 2020-07-24 DIAGNOSIS — G35 MULTIPLE SCLEROSIS (H): Primary | ICD-10-CM

## 2020-07-24 RX ORDER — DIMETHYL FUMARATE 240 MG/1
CAPSULE ORAL
Qty: 60 CAPSULE | Refills: 11 | Status: SHIPPED | OUTPATIENT
Start: 2020-07-24 | End: 2020-10-02

## 2020-07-24 NOTE — TELEPHONE ENCOUNTER
Received refill request for Tecfidera from [facility]; Patient was last seen in October by Natalia Rodney NP and has follow up appointment in August with Dr. Dee. Refilled per [place] refill protocol.    Annamaria MENCHACA

## 2020-08-04 ENCOUNTER — OFFICE VISIT (OUTPATIENT)
Dept: NEUROLOGY | Facility: CLINIC | Age: 25
End: 2020-08-04
Attending: PSYCHIATRY & NEUROLOGY
Payer: COMMERCIAL

## 2020-08-04 VITALS
HEART RATE: 88 BPM | DIASTOLIC BLOOD PRESSURE: 81 MMHG | HEIGHT: 62 IN | WEIGHT: 125 LBS | OXYGEN SATURATION: 100 % | BODY MASS INDEX: 23 KG/M2 | SYSTOLIC BLOOD PRESSURE: 134 MMHG | RESPIRATION RATE: 17 BRPM

## 2020-08-04 DIAGNOSIS — G37.9 DEMYELINATING DISEASE OF CENTRAL NERVOUS SYSTEM (H): ICD-10-CM

## 2020-08-04 DIAGNOSIS — E55.9 VITAMIN D DEFICIENCY: ICD-10-CM

## 2020-08-04 DIAGNOSIS — E55.9 VITAMIN D DEFICIENCY: Primary | ICD-10-CM

## 2020-08-04 DIAGNOSIS — G35 MS (MULTIPLE SCLEROSIS) (H): ICD-10-CM

## 2020-08-04 LAB
BASOPHILS # BLD AUTO: 0 10E9/L (ref 0–0.2)
BASOPHILS NFR BLD AUTO: 0.4 %
DIFFERENTIAL METHOD BLD: NORMAL
EOSINOPHIL # BLD AUTO: 0.1 10E9/L (ref 0–0.7)
EOSINOPHIL NFR BLD AUTO: 0.9 %
ERYTHROCYTE [DISTWIDTH] IN BLOOD BY AUTOMATED COUNT: 12.3 % (ref 10–15)
HCT VFR BLD AUTO: 42.6 % (ref 35–47)
HGB BLD-MCNC: 13.7 G/DL (ref 11.7–15.7)
IMM GRANULOCYTES # BLD: 0 10E9/L (ref 0–0.4)
IMM GRANULOCYTES NFR BLD: 0.1 %
LYMPHOCYTES # BLD AUTO: 2.4 10E9/L (ref 0.8–5.3)
LYMPHOCYTES NFR BLD AUTO: 35.5 %
MCH RBC QN AUTO: 27.6 PG (ref 26.5–33)
MCHC RBC AUTO-ENTMCNC: 32.2 G/DL (ref 31.5–36.5)
MCV RBC AUTO: 86 FL (ref 78–100)
MONOCYTES # BLD AUTO: 0.4 10E9/L (ref 0–1.3)
MONOCYTES NFR BLD AUTO: 6.3 %
NEUTROPHILS # BLD AUTO: 3.8 10E9/L (ref 1.6–8.3)
NEUTROPHILS NFR BLD AUTO: 56.8 %
NRBC # BLD AUTO: 0 10*3/UL
NRBC BLD AUTO-RTO: 0 /100
PLATELET # BLD AUTO: 300 10E9/L (ref 150–450)
RBC # BLD AUTO: 4.96 10E12/L (ref 3.8–5.2)
WBC # BLD AUTO: 6.7 10E9/L (ref 4–11)

## 2020-08-04 PROCEDURE — 85025 COMPLETE CBC W/AUTO DIFF WBC: CPT | Performed by: PSYCHIATRY & NEUROLOGY

## 2020-08-04 PROCEDURE — G0463 HOSPITAL OUTPT CLINIC VISIT: HCPCS | Mod: ZF

## 2020-08-04 PROCEDURE — 36415 COLL VENOUS BLD VENIPUNCTURE: CPT | Performed by: PSYCHIATRY & NEUROLOGY

## 2020-08-04 PROCEDURE — 82306 VITAMIN D 25 HYDROXY: CPT | Performed by: PSYCHIATRY & NEUROLOGY

## 2020-08-04 ASSESSMENT — PAIN SCALES - GENERAL: PAINLEVEL: NO PAIN (0)

## 2020-08-04 ASSESSMENT — MIFFLIN-ST. JEOR: SCORE: 1270.25

## 2020-08-04 NOTE — LETTER
8/4/2020      RE: Janneth Lucio  [address]  [place] [address]       Service Date: 08/04/2020      REASON FOR VISIT:  Janneth Lucio is a 25-year-old woman who I follow for multiple sclerosis.  She returns for clinical followup and MRI review.  I have seen her once previously in 07/2019, and she was last seen in clinic in 10/2019 by Natalia Rodney.      HISTORY OF PRESENT ILLNESS:  Janneth was diagnosed with MS last summer when she developed left hemiparesis with an enhancing lesion in the right posterior internal capsule and MRI evidence of dissemination in both space and time.  She also had a lesion at the C3 level in the cervical spinal cord that was not enhancing.  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  I started her on dimethyl fumarate, which she has tolerated very well.  She had flushing for about a month, but has had none since and her left hemiparesis has completely resolved.  She has some achy pain in her hands that is probably not related to multiple sclerosis.  It does not have any burning, tingling or electrical qualities typical of neuropathic pain.  It is not tremendously bothersome to her.  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  She is taking 5000 International units of vitamin D per day.  She has not had any significant new non-neurologic medical issues since I saw her last.      PHYSICAL EXAMINATION:   VITAL SIGNS:  Blood pressure 134/81.  Pulse 88.  Weight 125 pounds.   GENERAL:  She is alert and oriented.  Affect is bright and language functions are normal.   NEUROLOGICAL:  Cranial nerves are unremarkable.  Muscle bulk, tone, strength and dexterity are normal in the arms and legs.  Light touch is intact in the hands and feet.  Finger-nose-finger is normal.  Reflexes are normal and symmetric.  Her gait is narrow-based and stable.      We reviewed together her MRI of the brain done on 06/16/2020 and compared it to the MRI from last July.  There is an improvement  overall with significant strengthening of the lesion in the right internal capsule/thalamus.  There were no new lesions and no enhancing lesions.      IMPRESSION:  Relapsing remitting multiple sclerosis, clinically and radiologically stable on dimethyl fumarate.      I spent 25 minutes with Sofia, greater than 50% of which was spent in counseling and coordination of care and reviewing her recent MRI.  We discussed safety monitoring in Tecfidera, which basically consists of monitoring for lymphopenia and, if that is present, monitoring BENSON virus antibodies.  We discussed COVID-19 in MS and I told her that as long as lymphopenia does not develop, I do not consider patients on Tecfidera to be at any increased risk.  We discussed MRI monitoring and how we will gradually increase the interval between scans as long as they remain stable.  At present, I would plan to repeat a brain MRI next summer.      PLAN:   1.  CBC with diff and vitamin D level today.   2.  Followup in 6 months with Natalia Rodney.  She should follow up with me in 6 months after that with repeat MRI of the brain.         JH COURTNEY MD             D: 2020   T: 2020   MT: al      Name:     SOFIA BOB   MRN:      [id number]        Account:      [id number]   :      1995           Service Date: 2020      Document: [id number]

## 2020-08-04 NOTE — NURSING NOTE
Chief Complaint   Patient presents with     RECHECK     Return multiple Sclerosis     Medications reviewed and vital signs taken.   Joycelyn Babcock, SURJIT

## 2020-08-05 LAB — DEPRECATED CALCIDIOL+CALCIFEROL SERPL-MC: 58 UG/L (ref 20–75)

## 2020-08-06 NOTE — PROGRESS NOTES
Patient Instructions by Adriel Vidal MD at 04/08/17 12:02 PM     Author:  Adriel Vidal MD Service:  (none) Author Type:  Physician     Filed:  04/08/17 12:02 PM Encounter Date:  4/8/2017 Status:  Signed     :  Adriel Vidal MD (Physician)              Patient instructed to follow up with his regular Primary Care Physician or Walk-in Care if his symptoms fail to improve as anticipated, worsen, or new symptoms develop.  Please proceed to the nearest hospital Emergency Room or call 911 for medical emergencies.    Additional Educational Resources:  For additional resources regarding your symptoms, diagnosis, or further health information, please visit the Health Resources section on [email or web address] or the Online Health Resources section in [email or web address].            Revision History        User Key Date/Time User Provider Type Action    > [N/A] 04/08/17 12:02 PM Adriel Vidal MD Physician Sign             Service Date: 08/04/2020      REASON FOR VISIT:  Janneth Lucio is a 25-year-old woman who I follow for multiple sclerosis.  She returns for clinical followup and MRI review.  I have seen her once previously in 07/2019, and she was last seen in clinic in 10/2019 by Natalia Rodney.      HISTORY OF PRESENT ILLNESS:  Janneth was diagnosed with MS last summer when she developed left hemiparesis with an enhancing lesion in the right posterior internal capsule and MRI evidence of dissemination in both space and time.  She also had a lesion at the C3 level in the cervical spinal cord that was not enhancing.  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  I started her on dimethyl fumarate, which she has tolerated very well.  She had flushing for about a month, but has had none since and her left hemiparesis has completely resolved.  She has some achy pain in her hands that is probably not related to multiple sclerosis.  It does not have any burning, tingling or electrical qualities typical of neuropathic pain.  It is not tremendously bothersome to her.  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  She is taking 5000 International units of vitamin D per day.  She has not had any significant new non-neurologic medical issues since I saw her last.      PHYSICAL EXAMINATION:   VITAL SIGNS:  Blood pressure 134/81.  Pulse 88.  Weight 125 pounds.   GENERAL:  She is alert and oriented.  Affect is bright and language functions are normal.   NEUROLOGICAL:  Cranial nerves are unremarkable.  Muscle bulk, tone, strength and dexterity are normal in the arms and legs.  Light touch is intact in the hands and feet.  Finger-nose-finger is normal.  Reflexes are normal and symmetric.  Her gait is narrow-based and stable.      We reviewed together her MRI of the brain done on 06/16/2020 and compared it to the MRI from last July.  There is an improvement overall with significant strengthening of the lesion in the right internal  capsule/thalamus.  There were no new lesions and no enhancing lesions.      IMPRESSION:  Relapsing remitting multiple sclerosis, clinically and radiologically stable on dimethyl fumarate.      I spent 25 minutes with Sofia, greater than 50% of which was spent in counseling and coordination of care and reviewing her recent MRI.  We discussed safety monitoring in Tecfidera, which basically consists of monitoring for lymphopenia and, if that is present, monitoring BENSON virus antibodies.  We discussed COVID-19 in MS and I told her that as long as lymphopenia does not develop, I do not consider patients on Tecfidera to be at any increased risk.  We discussed MRI monitoring and how we will gradually increase the interval between scans as long as they remain stable.  At present, I would plan to repeat a brain MRI next summer.      PLAN:   1.  CBC with diff and vitamin D level today.   2.  Followup in 6 months with Natalia Rodney.  She should follow up with me in 6 months after that with repeat MRI of the brain.         JH COURTNEY MD             D: 2020   T: 2020   MT: al      Name:     SOFIA BOB   MRN:      [id number]        Account:      [id number]   :      1995           Service Date: 2020      Document: [id number]

## 2020-10-02 ENCOUNTER — TELEPHONE (OUTPATIENT)
Dept: NEUROLOGY | Facility: CLINIC | Age: 25
End: 2020-10-02

## 2020-10-02 DIAGNOSIS — G35 MULTIPLE SCLEROSIS (H): ICD-10-CM

## 2020-10-02 RX ORDER — DIMETHYL FUMARATE 240 MG/1
240 CAPSULE ORAL 2 TIMES DAILY
Qty: 60 CAPSULE | Refills: 11 | Status: SHIPPED | OUTPATIENT
Start: 2020-10-02 | End: 2020-12-24

## 2020-10-02 RX ORDER — DIMETHYL FUMARATE 240 MG/1
240 CAPSULE ORAL 2 TIMES DAILY
Qty: 60 CAPSULE | Refills: 11 | OUTPATIENT
Start: 2020-10-02 | End: 2020-10-02

## 2020-10-02 NOTE — TELEPHONE ENCOUNTER
I called [facility] specialty pharmacy and left a voice message that they can substitute with generic Tecfidera. Also provided my call back number if they have additional questions.

## 2020-10-02 NOTE — TELEPHONE ENCOUNTER
Shabnam ([facility]) from [facility] left a voicemail to verify if the patient could switch to generic Tecfidera, Dimethyl Fumarate. If so, a new RX for the generic can be sent to [facility] or she can be reach at phone # [phone] ext. [phone].    Thank you,    Jodi Browning [facility]  Specialty Pharmacy Clinic [facility] [place] [address]  Ph: [phone] Fax: [phone]  [email or web address]

## 2020-10-02 NOTE — TELEPHONE ENCOUNTER
Health Call Center    Phone Message    May a detailed message be left on voicemail: yes     Reason for Call: Medication Question or concern regarding medication   Prescription Clarification  Name of Medication: TECFIDERA 240 MG CPDR  Prescribing Provider: Dr. Lewis Dee   [facility]    What on the order needs clarification? The generic is out on the market now, would provider be comfortable with the generic? Please let the pharmacy know and then if the generic is okay, please send a new script. Thank you.          Action Taken: Message routed to:  [facility] ([facility]  Neurology    Travel Screening: Not Applicable

## 2020-11-29 ENCOUNTER — HEALTH MAINTENANCE LETTER (OUTPATIENT)
Age: 25
End: 2020-11-29

## 2020-12-01 ENCOUNTER — OFFICE VISIT - RIVER FALLS (OUTPATIENT)
Dept: FAMILY MEDICINE | Facility: CLINIC | Age: 25
End: 2020-12-01
Payer: COMMERCIAL

## 2020-12-14 ENCOUNTER — TELEPHONE (OUTPATIENT)
Dept: NEUROLOGY | Facility: CLINIC | Age: 25
End: 2020-12-14

## 2020-12-14 DIAGNOSIS — G35 MULTIPLE SCLEROSIS (H): ICD-10-CM

## 2020-12-14 NOTE — TELEPHONE ENCOUNTER
PA Initiation    Medication: Dimethyl Fumarate 240MG Capsules (PENDING - New Ins.)  Insurance Company: [employer] [place] - Phone [phone] Fax [phone]  Ph[facility]illing the Rx[facility] - [place], [place] - [address] [place] [address]  Filling Pharmacy Phone:    Filling Pharmacy Fax:    Start Date: 12/14/2020

## 2020-12-15 NOTE — TELEPHONE ENCOUNTER
Prior Authorization Not Needed per Insurance    Medication: Dimethyl Fumarate 240MG Capsules (NO PA NEEDED - New Ins.)  Insurance Company: [employer] [place] - Phone [phone] Fax [phone]  Expected CoPay:      Ph[facility] the Rx[facility] [place], [place] - [address] [facility]  Pharmacy Notified: Yes  Patient Notified: Yes

## 2020-12-17 ENCOUNTER — OFFICE VISIT - RIVER FALLS (OUTPATIENT)
Dept: FAMILY MEDICINE | Facility: CLINIC | Age: 25
End: 2020-12-17
Payer: COMMERCIAL

## 2020-12-24 RX ORDER — DIMETHYL FUMARATE 240 MG/1
240 CAPSULE ORAL 2 TIMES DAILY
Qty: 60 CAPSULE | Refills: 11 | Status: SHIPPED | OUTPATIENT
Start: 2020-12-24 | End: 2023-01-17

## 2020-12-24 NOTE — TELEPHONE ENCOUNTER
[facility] Call Center    Phone Message    May a detailed message be left on voicemail: yes     Reason for Call: Other: Patient is calling requesting that Rx is called into the pharmacy [facility]. The pharmacy needs the Rx number and requesting for it to be called into the pharmacy. Please call:   [phone]    Patient only has 3 days left of her supply      Please advise.    Action Taken: Message routed to:  [facility] ([facility]):  MS    Travel Screening: Not Applicable

## 2021-01-15 ENCOUNTER — TELEPHONE (OUTPATIENT)
Dept: NEUROLOGY | Facility: CLINIC | Age: 26
End: 2021-01-15

## 2021-01-15 NOTE — TELEPHONE ENCOUNTER
I spoke with her insurance and what I found out is not good. She has a deductible of $4,500, none of which is met yet. So, the first fill for Dimethyl fumarate would be around $2,075.59 and if nothing else has been submitted to the insurance before the next fill the cost would be the remaining amount to equal the $4,500. Once that was met there is a 30% co-insurance after the insurance pays their portion. I did ask if they did what that would be, but they were unsure due to it being a percentage. The cost is outrageous.    I also inquired about switching to brand Tecfidera. It s considered  Not covered under plan. The only way we could get it covered is if she had an allergic reaction to the generic. I m not sure what the cost would be if they did approve it. When I spoke with the Tecfidera copay program they stated they covered up to $20,000 year.     Thank you,    Jodi Browning [facility]  Specialty Pharmacy Clinic [facility] [address]  [address] [place] [address]  Ph: [phone] Fax: [phone]  [email or web address]

## 2021-01-18 NOTE — TELEPHONE ENCOUNTER
Meloxicam: 1 in morning with meal    Methocarbamol:     morning, noon,3-5 (optional)    Flexeril    At night   Sounds like we need to switch treatment.  Given her needle-phobia and positive BENSON virus, I would suggest rituximab or ocrelizumab.  (Not sure how affordable those will be for her).  She would need baseline labs

## 2021-01-18 NOTE — TELEPHONE ENCOUNTER
[employer] message sent to patient with Dr. Dee's input.    Christianne Barcenas, MS RN Care Coordinator

## 2021-01-19 ENCOUNTER — TELEPHONE (OUTPATIENT)
Dept: NEUROLOGY | Facility: CLINIC | Age: 26
End: 2021-01-19

## 2021-01-19 NOTE — LETTER
2021    [employer] Claim Review Department     RE:  Janneth Lucio   : 1995   Member ID: [id number]   Vumerity Appeal    To Whom It May Concern,    I am writing on behalf of my patient, Ms. Janneth Lucio to document the medical necessity of Vumerity for the treatment of relapsing-remitting multiple sclerosis. This letter provides information about the patient's medical history and diagnosis and statement summarizing my treatment rationale.    You have previously denied Vumerity, stating that she must try and fail two formulary preferred products. I will remind you that Vumerity is an FDA approved treatment for relapsing-remitting multiple sclerosis. There is no medical or safety basis as to why you are denying coverage of Vumerity. Injectable medications, such as glatiramer and interferons, are not possible for Ms. Lucio given her extreme needle-phobia. Aubagio is not a feasible option given she is a young woman of child-bearing age. Dimethyl fumarate is not affordable for her. Gilenya, Mayzent, Tysabri and Zeposia are contraindicated due to positive BENSON Virus antibody status. With the above information, this leaves Vumerity, Ocrevus and Mavenclad as options. Vumerity is the least immunosuppressive and thus, the best option. In order to continue to provide safe, effective care for Ms. Lucio, I urge you to cover Vumerity for her.    Please contact our office with questions.    Sincerely,    **electronically signed**    MD Christianne Valero MS RN Care Coordinator  Multiple Sclerosis Center  [facility]  [address] [place] [address]  Phone [phone]  Fax [phone]

## 2021-01-19 NOTE — TELEPHONE ENCOUNTER
Prior Authorization Specialty Medication Request    Medication/Dose: Vumerity 231mg and Titration Starter Pack  ICD code (if different than what is on RX):  Relapsing Remitting Multiple Sclerosis, G35  Previously Tried and Failed:  None    Important Lab Values: n/a  Rationale: Initiation of alternate disease modifying therapy for demyelinating disease, as out-of-pocket expense for generic dimethyl fumarate is unaffordable without copay assistance.    Insurance Name: [employer]  Insurance ID: [id number]  Insurance Phone Number: n/a    Pharmacy Information (if different than what is on RX)  Name:  n/a  Phone:  n/a

## 2021-01-20 NOTE — TELEPHONE ENCOUNTER
PRIOR AUTHORIZATION DENIED    Medication: Vumerity 231MG Capsules (DENIED)    Denial Date: 1/20/2021    Denial Rational: Try and fail two preferred products    Appeal Information:

## 2021-01-20 NOTE — TELEPHONE ENCOUNTER
PA Initiation    Medication: Vumerity 231MG Capsules (PENDING)  Insurance Company: [employer] [place] - Phone [phone] Fax [phone]  Ph[facility]illing the Rx[facility] [place], [place] - [address] [facility] [employer]  Filling Pharmacy Phone:    Filling Pharmacy Fax:    Start Date: 1/20/2021      [employer] [facility] preferred products:  Aubagio, Avonex, Betaseron, Copaxone/glatiramer, dimethyl fumarate, Gilenya, Mavenclad, Mayzent, Plegridy, Rebif, Zeposia

## 2021-01-25 NOTE — TELEPHONE ENCOUNTER
Vumerity appeal letter drafted; Please submit to insurance; Thank you.    Christianne Barcenas, MS RN Care Coordinator

## 2021-01-26 ENCOUNTER — NURSE TRIAGE (OUTPATIENT)
Dept: NURSING | Facility: CLINIC | Age: 26
End: 2021-01-26

## 2021-01-26 NOTE — TELEPHONE ENCOUNTER
Medication Appeal Initiation    We have initiated an appeal for the requested medication:  Medication: Vumerity 231MG Capsules (APPEAL INITIATED)  Appeal Start Date:  1/26/2021  Insurance Company: [employer] [place] - Phone [phone] Fax [phone]  Comments:  Submitted appeal letter and most recent office visit to [employer] [place] fax # [phone] marked urgent.         Thank you,    Jodi Browning [facility]  Specialty Pharmacy Clinic [facility] [address] [address]  Ph: [phone] Fax: [phone]  [email or web address]

## 2021-01-26 NOTE — TELEPHONE ENCOUNTER
"Patient calling asking if her provider sent Prior Authorization for her medication Vumerity 231 mg. RN reviewed the following not from clinic.     \"Medication Appeal Initiation     We have initiated an appeal for the requested medication:  Medication: Vumerity 231MG Capsules (APPEAL INITIATED)  Appeal Start Date:  1/26/2021  Insurance Company: [employer] [place] - Phone [phone] Fax [phone]  Comments:  Submitted appeal letter and most recent office visit to [employer] [place] fax # [phone] marked urgent\"    Caller verbalized understanding. Denies further questions.      Barron Perez RN  [facility]     "

## 2021-01-27 NOTE — TELEPHONE ENCOUNTER
Health Call Center    Phone Message    May a detailed message be left on voicemail: yes     Reason for Call: Other: pt reporting to Dr. Dee/Christianne regarding the appeal to insurance for the Vumerity: pt just updated her insurance information for 2021.  It is not [facility] [place].  All numbers for this insurance have been updated today and pt is requesting that Dr. Loaiza's letter for the appeal goes to pt's new insurance group. If needed, please call pt. Thank you.    Action Taken: Message routed to:  [facility] ([facility]): [facility]    Travel Screening: Not Applicable

## 2021-01-28 NOTE — TELEPHONE ENCOUNTER
[facility] Call Center    Phone Message    May a detailed message be left on voicemail: no     Reason for Call: Other: [place] calling to state that the appeal has been denied. She will be faxing a copy of the denial to [phone].     Action Taken: Message routed to:  [facility] ([facility]): [facility] MS    Travel Screening: Not Applicable

## 2021-02-01 NOTE — TELEPHONE ENCOUNTER
MEDICATION APPEAL DENIED    Medication: Vumerity 231MG Capsules (APPEAL DENIED)    Denial Date: 1/28/2021    Denial Rational: Must try and fail two preferred products    Second Level Appeal Information: External appeal info noted below

## 2021-02-02 ENCOUNTER — MEDICAL CORRESPONDENCE (OUTPATIENT)
Dept: HEALTH INFORMATION MANAGEMENT | Facility: CLINIC | Age: 26
End: 2021-02-02

## 2021-02-02 NOTE — TELEPHONE ENCOUNTER
Vumerity start form along with PA and Appeal denial letters faxed to [facility] for review. Requested PAP (Patient Assistance Program - Free Drug).     Thank you,    Jodi Browning [facility]  Specialty Pharmacy Clinic [address] [address]  Ph: [phone] Fax: [phone]  [email or web address]

## 2021-02-10 NOTE — TELEPHONE ENCOUNTER
Free Drug Application Approved  Effective Dates: 2/3/2021-12/31/2021  Patient notified?: Yes  Additional Information: Patient has been approved to receive Vumerity through [employer] PAP.     Thank you,    Jodi Browning [facility]  Specialty Pharmacy Clinic [facility]  [address] [place] [address]  Ph: [phone] Fax: [phone]  [email or web address]

## 2021-03-01 ENCOUNTER — OFFICE VISIT (OUTPATIENT)
Dept: NEUROLOGY | Facility: CLINIC | Age: 26
End: 2021-03-01
Attending: NURSE PRACTITIONER
Payer: COMMERCIAL

## 2021-03-01 VITALS
RESPIRATION RATE: 16 BRPM | WEIGHT: 135 LBS | DIASTOLIC BLOOD PRESSURE: 70 MMHG | OXYGEN SATURATION: 99 % | BODY MASS INDEX: 24.69 KG/M2 | HEART RATE: 77 BPM | SYSTOLIC BLOOD PRESSURE: 120 MMHG

## 2021-03-01 DIAGNOSIS — G35 MULTIPLE SCLEROSIS (H): Primary | ICD-10-CM

## 2021-03-01 DIAGNOSIS — G35 MULTIPLE SCLEROSIS (H): ICD-10-CM

## 2021-03-01 LAB
ALBUMIN SERPL-MCNC: 3.9 G/DL (ref 3.4–5)
ALP SERPL-CCNC: 51 U/L (ref 40–150)
ALT SERPL W P-5'-P-CCNC: 15 U/L (ref 0–50)
AST SERPL W P-5'-P-CCNC: 7 U/L (ref 0–45)
BILIRUB DIRECT SERPL-MCNC: <0.1 MG/DL (ref 0–0.2)
BILIRUB SERPL-MCNC: 0.2 MG/DL (ref 0.2–1.3)
PROT SERPL-MCNC: 7.4 G/DL (ref 6.8–8.8)

## 2021-03-01 PROCEDURE — 80076 HEPATIC FUNCTION PANEL: CPT | Performed by: PATHOLOGY

## 2021-03-01 PROCEDURE — 99213 OFFICE O/P EST LOW 20 MIN: CPT | Performed by: NURSE PRACTITIONER

## 2021-03-01 PROCEDURE — 36415 COLL VENOUS BLD VENIPUNCTURE: CPT | Performed by: PATHOLOGY

## 2021-03-01 ASSESSMENT — PAIN SCALES - GENERAL: PAINLEVEL: NO PAIN (0)

## 2021-03-01 NOTE — PROGRESS NOTES
[facility] OUTPATIENT MULTIPLE SCLEROSIS CLINIC VISIT NOTE    CHIEF COMPLAINT: Janneth is a 25 year old female who presents to the clinic today for regularly scheduled follow up of RRMS. Janneth was most recently seen by Dr. Dee on 08/04/2020.      HISTORY OF PRESENT ILLNESS:   Janneth was originally diagnosed with MS Summer of 2019. She originally presented with left hemiparesis with an enhancing lesion in the right posterior internal capsule and MRI evidence of dissemination in both space and time. She also had a lesion at the C3 level in the cervical spinal cord that was not enhancing. 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 She was started in dimethyl fumarate, which she tolerated really well. She did have about 1 month of flushing and 10 days of diarrhea, but self resolved. Since starting Dimethyl fumarate, all symptoms resolved including hemiparesis.     She recently got switched to Vumerity (first dose today) as patient was having trouble getting Dimethyl fumarate & generic covered by her insurance. She is now on a free drug program through [employer] to cover Vumerity. Today is patient's first dose, but have not noticed any side effects yet. Today denies GI symptoms, fatigue, hemiparesis, swallow difficulties, bowel/bladder problems, vision changes, or mood changes. Patient does take Vitamin D, but is unsure dose.      ROS : Overall doing well. No recent hospitalization or illness. Patient denies any new changes in vision, balance, strength or sensation suggestive of new relapse of multiple sclerosis since he/she was last seen here.      PHYSICAL EXAMINATION:   VITAL SIGNS: /70   Pulse 77   Resp 16   Wt 61.2 kg (135 lb)   SpO2 99%   BMI 24.69 kg/m      MENTAL STATUS: Alert,awake and oriented times four.   CRANIAL NERVES: Visual fields are full to confrontation. The pupils are round and react to light and there is no Mike Jn pupil. Funduscopic examination demonstrates  no optic pallor, papilledema or retinal hemorrhage/exudate. Extraocular movements are intact with no internuclear ophthalmoplegia. No nystagmus. Facial strength and sensation are normal. Hearing is normal. Palate elevation and tongue protrusion are normal.   POWER: Strength is as follows in the following muscles/groups (Right/Left,MRC scale): Shoulder abduction 5/5, elbow flexion 5/5, elbow extension 5/5, wrist extension 5/5, digit extension 5/5, digit flexion 5/5, Hip flexion 5/5, knee extension 5/5, knee flexion 5/5, foot dorsiflexion 5/5.   SENSORY: intact to light touch  REFLEXES: Reflexes are normal, present and symmetric at biceps, triceps, brachioradialis, knees and ankles.   MOTOR/CEREBELLAR: There are  no tremors, myoclonus or other abnormal movements. Tone is within normal limits in the limbs. There is no appendicular ataxia on finger-to-nose testing and rapid alternating movements are normal in the extremities. There is no pronator drift. Romberg negative.  GAIT: Gait is narrow-based and steady and the patient is able to walk on heels, toes and in tandem without difficulty.      ASSESSMENT/PLAN:  1. Relapsing Remitting Multiple sclerosis, on Vumerity:    In 2019, initial presentation with left hemiparesis with MRI evidence of dissemination in both space and time, lesion at C3 level in cervical spinal cord that was not enhancing and positive spinal fluid for intrathecal IgG synthesis. Originally started on Dimethyl fumarate, but was changed to Vumerity due to insurance coverage. Patient is now on free drug program and started first dose of Vumerity today. So far tolerating well without side effects. She will have a follow-up MRI brain in Sept. 2021 and follow-up with Dr. Dee after. Will check Hepatic panel today.   - Hepatic panel; Future  - MRI Brain w & w/o contrast; Future    2. Vitamin D level:  Continue to take Vit D.     3. Contraception  Patient is sexually active and currently uses oral  contraception. Patient is thinking about starting to have a family in 3 years, but will touch base with us again prior.     [employer]AR[employer] student     Attending provider: I saw and evaluated the patient with the student NP and I agree with her findings and plan of care as documented above.     Natalia Rodney CNP  Neurology      10 minutes spent on the date of the encounter on chart review, history and examination, documentation and further activities as noted above

## 2021-03-01 NOTE — PATIENT INSTRUCTIONS
1. Labs today.    2. Continue Vit D daily.     3. Continue Vumerity as prescribed.     4. MRI Brain in 6 months.     5. F/u with Dr. Dee after imaging.

## 2021-03-01 NOTE — LETTER
3/1/2021       RE: Janneth Lucio  [address]  [place] [address]     Dear Colleague,    Thank you for referring your patient, Janneth Lucio, to the [facility] [place] at [facility]. Please see a copy of my visit note below.    [facility] OUTPATIENT MULTIPLE SCLEROSIS CLINIC VISIT NOTE    CHIEF COMPLAINT: Janneth is a 25 year old female who presents to the clinic today for regularly scheduled follow up of RRMS. Janneth was most recently seen by Dr. Dee on 08/04/2020.      HISTORY OF PRESENT ILLNESS:   Janneth was originally diagnosed with MS Summer of 2019. She originally presented with left hemiparesis with an enhancing lesion in the right posterior internal capsule and MRI evidence of dissemination in both space and time. She also had a lesion at the C3 level in the cervical spinal cord that was not enhancing. 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 She was started in dimethyl fumarate, which she tolerated really well. She did have about 1 month of flushing and 10 days of diarrhea, but self resolved. Since starting Dimethyl fumarate, all symptoms resolved including hemiparesis.     She recently got switched to Vumerity (first dose today) as patient was having trouble getting Dimethyl fumarate & generic covered by her insurance. She is now on a free drug program through [employer] to cover Vumerity. Today is patient's first dose, but have not noticed any side effects yet. Today denies GI symptoms, fatigue, hemiparesis, swallow difficulties, bowel/bladder problems, vision changes, or mood changes. Patient does take Vitamin D, but is unsure dose.      ROS : Overall doing well. No recent hospitalization or illness. Patient denies any new changes in vision, balance, strength or sensation suggestive of new relapse of multiple sclerosis since he/she was last seen here.      PHYSICAL EXAMINATION:    VITAL SIGNS: /70   Pulse 77   Resp 16   Wt 61.2 kg (135 lb)   SpO2 99%   BMI 24.69 kg/m      MENTAL STATUS: Alert,awake and oriented times four.   CRANIAL NERVES: Visual fields are full to confrontation. The pupils are round and react to light and there is no Mike Jn pupil. Funduscopic examination demonstrates no optic pallor, papilledema or retinal hemorrhage/exudate. Extraocular movements are intact with no internuclear ophthalmoplegia. No nystagmus. Facial strength and sensation are normal. Hearing is normal. Palate elevation and tongue protrusion are normal.   POWER: Strength is as follows in the following muscles/groups (Right/Left,MRC scale): Shoulder abduction 5/5, elbow flexion 5/5, elbow extension 5/5, wrist extension 5/5, digit extension 5/5, digit flexion 5/5, Hip flexion 5/5, knee extension 5/5, knee flexion 5/5, foot dorsiflexion 5/5.   SENSORY: intact to light touch  REFLEXES: Reflexes are normal, present and symmetric at biceps, triceps, brachioradialis, knees and ankles.   MOTOR/CEREBELLAR: There are  no tremors, myoclonus or other abnormal movements. Tone is within normal limits in the limbs. There is no appendicular ataxia on finger-to-nose testing and rapid alternating movements are normal in the extremities. There is no pronator drift. Romberg negative.  GAIT: Gait is narrow-based and steady and the patient is able to walk on heels, toes and in tandem without difficulty.      ASSESSMENT/PLAN:  1. Relapsing Remitting Multiple sclerosis, on Vumerity:    In 2019, initial presentation with left hemiparesis with MRI evidence of dissemination in both space and time, lesion at C3 level in cervical spinal cord that was not enhancing and positive spinal fluid for intrathecal IgG synthesis. Originally started on Dimethyl fumarate, but was changed to Vumerity due to insurance coverage. Patient is now on free drug program and started first dose of Vumerity today. So far tolerating well  without side effects. She will have a follow-up MRI brain in Sept. 2021 and follow-up with Dr. Dee after. Will check Hepatic panel today.   - Hepatic panel; Future  - MRI Brain w & w/o contrast; Future    2. Vitamin D level:  Continue to take Vit D.     3. Contraception  Patient is sexually active and currently uses oral contraception. Patient is thinking about starting to have a family in 3 years, but will touch base with us again prior.     [employer] student     Attending provider: I saw and evaluated the patient with the student NP and I agree with her findings and plan of care as documented above.     Natalia Rodney CNP  Neurology      10 minutes spent on the date of the encounter on chart review, history and examination, documentation and further activities as noted above        Again, thank you for allowing me to participate in the care of your patient.      Sincerely,    BRITNEY Minor CNP

## 2021-03-23 ENCOUNTER — IMMUNIZATION (OUTPATIENT)
Dept: NURSING | Facility: CLINIC | Age: 26
End: 2021-03-23
Payer: COMMERCIAL

## 2021-03-23 PROCEDURE — 0001A PR COVID VAC PFIZER DIL RECON 30 MCG/0.3 ML IM: CPT

## 2021-03-23 PROCEDURE — 91300 PR COVID VAC PFIZER DIL RECON 30 MCG/0.3 ML IM: CPT

## 2021-04-13 ENCOUNTER — IMMUNIZATION (OUTPATIENT)
Dept: NURSING | Facility: CLINIC | Age: 26
End: 2021-04-13
Attending: INTERNAL MEDICINE
Payer: COMMERCIAL

## 2021-04-13 PROCEDURE — 91300 PR COVID VAC PFIZER DIL RECON 30 MCG/0.3 ML IM: CPT

## 2021-04-13 PROCEDURE — 0002A PR COVID VAC PFIZER DIL RECON 30 MCG/0.3 ML IM: CPT

## 2021-05-03 ENCOUNTER — OFFICE VISIT - RIVER FALLS (OUTPATIENT)
Dept: FAMILY MEDICINE | Facility: CLINIC | Age: 26
End: 2021-05-03
Payer: COMMERCIAL

## 2021-05-03 ASSESSMENT — MIFFLIN-ST. JEOR: SCORE: 1269.33

## 2021-09-17 ENCOUNTER — ANCILLARY PROCEDURE (OUTPATIENT)
Dept: MRI IMAGING | Facility: CLINIC | Age: 26
End: 2021-09-17
Attending: NURSE PRACTITIONER
Payer: COMMERCIAL

## 2021-09-17 DIAGNOSIS — G35 MULTIPLE SCLEROSIS (H): ICD-10-CM

## 2021-09-17 PROCEDURE — A9585 GADOBUTROL INJECTION: HCPCS | Performed by: RADIOLOGY

## 2021-09-17 PROCEDURE — 70553 MRI BRAIN STEM W/O & W/DYE: CPT | Mod: GC | Performed by: RADIOLOGY

## 2021-09-17 RX ORDER — GADOBUTROL 604.72 MG/ML
7.5 INJECTION INTRAVENOUS ONCE
Status: COMPLETED | OUTPATIENT
Start: 2021-09-17 | End: 2021-09-17

## 2021-09-17 RX ADMIN — GADOBUTROL 6 ML: 604.72 INJECTION INTRAVENOUS at 15:10

## 2021-09-17 NOTE — DISCHARGE INSTRUCTIONS
MRI Contrast Discharge Instructions    The IV contrast you received today will pass out of your body in your  urine. This will happen in the next 24 hours. You will not feel this process.  Your urine will not change color.    Drink at least 4 extra glasses of water or juice today (unless your doctor  has restricted your fluids). This reduces the stress on your kidneys.  You may take your regular medicines.    If you are on dialysis: It is best to have dialysis today.    If you have a reaction: Most reactions happen right away. If you have  any new symptoms after leaving the hospital (such as hives or swelling),  call your hospital at the correct number below. Or call your family doctor.  If you have breathing distress or wheezing, call 911.    Special instructions: ***    I have read and understand the above information.    Signature:______________________________________ Date:___________    Staff:__________________________________________ Date:___________     Time:__________    [facility] Departments:    ___[place]: [phone]  ___[facility]: [phone]  ___[place]: [phone] ___[facility]: [phone]  ___[facility]: [phone]  ___[facility]: [phone]  ___[phone]  ___[facility]: [phone]  ___[phone]

## 2021-09-25 ENCOUNTER — HEALTH MAINTENANCE LETTER (OUTPATIENT)
Age: 26
End: 2021-09-25

## 2021-12-28 ENCOUNTER — OFFICE VISIT - RIVER FALLS (OUTPATIENT)
Dept: FAMILY MEDICINE | Facility: CLINIC | Age: 26
End: 2021-12-28
Payer: COMMERCIAL

## 2021-12-28 ASSESSMENT — MIFFLIN-ST. JEOR: SCORE: 1271.37

## 2021-12-29 ENCOUNTER — TRANSFERRED RECORDS (OUTPATIENT)
Dept: HEALTH INFORMATION MANAGEMENT | Facility: CLINIC | Age: 26
End: 2021-12-29

## 2021-12-29 LAB
C TRACH DNA SPEC QL PROBE+SIG AMP: NOT DETECTED
N GONORRHOEA DNA SPEC QL PROBE+SIG AMP: NOT DETECTED
SPECIMEN DESCRIP: NORMAL
SPECIMEN DESCRIPTION: NORMAL

## 2022-01-04 ENCOUNTER — COMMUNICATION - RIVER FALLS (OUTPATIENT)
Dept: FAMILY MEDICINE | Facility: CLINIC | Age: 27
End: 2022-01-04
Payer: COMMERCIAL

## 2022-01-15 ENCOUNTER — HEALTH MAINTENANCE LETTER (OUTPATIENT)
Age: 27
End: 2022-01-15

## 2022-02-11 VITALS
SYSTOLIC BLOOD PRESSURE: 120 MMHG | BODY MASS INDEX: 23.17 KG/M2 | WEIGHT: 125.9 LBS | HEIGHT: 62 IN | HEART RATE: 80 BPM | TEMPERATURE: 97.9 F | DIASTOLIC BLOOD PRESSURE: 66 MMHG

## 2022-02-12 VITALS
HEART RATE: 108 BPM | HEIGHT: 62 IN | SYSTOLIC BLOOD PRESSURE: 120 MMHG | SYSTOLIC BLOOD PRESSURE: 97 MMHG | TEMPERATURE: 99.2 F | HEART RATE: 80 BPM | DIASTOLIC BLOOD PRESSURE: 70 MMHG | BODY MASS INDEX: 21.67 KG/M2 | TEMPERATURE: 98.8 F | OXYGEN SATURATION: 99 % | WEIGHT: 116.6 LBS | BODY MASS INDEX: 21.23 KG/M2 | DIASTOLIC BLOOD PRESSURE: 63 MMHG | WEIGHT: 115.4 LBS

## 2022-02-12 VITALS
HEART RATE: 72 BPM | HEIGHT: 62 IN | DIASTOLIC BLOOD PRESSURE: 60 MMHG | SYSTOLIC BLOOD PRESSURE: 124 MMHG | BODY MASS INDEX: 21.95 KG/M2 | TEMPERATURE: 98.1 F

## 2022-02-12 VITALS
TEMPERATURE: 98.6 F | TEMPERATURE: 98.6 F | SYSTOLIC BLOOD PRESSURE: 116 MMHG | OXYGEN SATURATION: 98 % | HEIGHT: 62 IN | BODY MASS INDEX: 23.41 KG/M2 | WEIGHT: 125.4 LBS | WEIGHT: 127.2 LBS | HEART RATE: 104 BPM | SYSTOLIC BLOOD PRESSURE: 120 MMHG | BODY MASS INDEX: 23.08 KG/M2 | DIASTOLIC BLOOD PRESSURE: 64 MMHG | OXYGEN SATURATION: 97 % | HEIGHT: 62 IN | DIASTOLIC BLOOD PRESSURE: 70 MMHG | HEART RATE: 99 BPM

## 2022-02-12 VITALS
TEMPERATURE: 103.7 F | DIASTOLIC BLOOD PRESSURE: 64 MMHG | HEIGHT: 62 IN | DIASTOLIC BLOOD PRESSURE: 60 MMHG | WEIGHT: 122 LBS | HEART RATE: 68 BPM | SYSTOLIC BLOOD PRESSURE: 106 MMHG | WEIGHT: 128 LBS | BODY MASS INDEX: 22.45 KG/M2 | TEMPERATURE: 97.7 F | HEART RATE: 84 BPM | SYSTOLIC BLOOD PRESSURE: 122 MMHG | BODY MASS INDEX: 23.79 KG/M2

## 2022-02-12 VITALS
HEART RATE: 93 BPM | HEIGHT: 63 IN | DIASTOLIC BLOOD PRESSURE: 60 MMHG | WEIGHT: 124.6 LBS | BODY MASS INDEX: 22.08 KG/M2 | RESPIRATION RATE: 16 BRPM | SYSTOLIC BLOOD PRESSURE: 90 MMHG | OXYGEN SATURATION: 98 %

## 2022-02-12 VITALS
HEIGHT: 62 IN | BODY MASS INDEX: 23.26 KG/M2 | HEART RATE: 68 BPM | WEIGHT: 126.4 LBS | DIASTOLIC BLOOD PRESSURE: 72 MMHG | SYSTOLIC BLOOD PRESSURE: 117 MMHG

## 2022-02-12 VITALS
BODY MASS INDEX: 23.61 KG/M2 | TEMPERATURE: 99.6 F | HEART RATE: 88 BPM | WEIGHT: 127 LBS | DIASTOLIC BLOOD PRESSURE: 84 MMHG | SYSTOLIC BLOOD PRESSURE: 122 MMHG

## 2022-02-12 VITALS
SYSTOLIC BLOOD PRESSURE: 112 MMHG | WEIGHT: 120 LBS | HEART RATE: 87 BPM | DIASTOLIC BLOOD PRESSURE: 66 MMHG | BODY MASS INDEX: 22.08 KG/M2 | HEIGHT: 62 IN

## 2022-02-12 VITALS
BODY MASS INDEX: 21.16 KG/M2 | OXYGEN SATURATION: 97 % | DIASTOLIC BLOOD PRESSURE: 66 MMHG | WEIGHT: 115 LBS | SYSTOLIC BLOOD PRESSURE: 116 MMHG | TEMPERATURE: 98.8 F | HEIGHT: 62 IN | HEART RATE: 112 BPM

## 2022-02-12 VITALS
WEIGHT: 133.8 LBS | DIASTOLIC BLOOD PRESSURE: 64 MMHG | SYSTOLIC BLOOD PRESSURE: 120 MMHG | HEART RATE: 74 BPM | BODY MASS INDEX: 24.62 KG/M2 | TEMPERATURE: 98.7 F | HEIGHT: 62 IN

## 2022-02-12 VITALS
SYSTOLIC BLOOD PRESSURE: 122 MMHG | TEMPERATURE: 99 F | DIASTOLIC BLOOD PRESSURE: 68 MMHG | HEART RATE: 95 BPM | OXYGEN SATURATION: 97 %

## 2022-02-15 NOTE — PROGRESS NOTES
Patient:   SOFIA BOB            MRN: [id number]            FIN: [id number]               Age:   22 years     Sex:  Female     :  1995   Associated Diagnoses:   Well woman exam; Encounter for contraceptive management; Headache   Author:   Christie De Dios      Visit Information      Date of Service: 2018 09:42 am  Performing Location: [facility]  Encounter#: [id number]      Primary Care Provider (PCP):  NONE ,       Referring Provider:  Missy Ford MD# [id number]   Visit type:  Annual exam.    Accompanied by:  No one.    Source of history:  Self.    History limitation:  None.       Chief Complaint   2018 9:54 AM CST     Patient is here for annual exam.  Needs Mantoux for school      Well Adult History   Here for annual exam, first pap smear and 2 step Mantoux test for school/work.  Takes Monoessa ocp without problems.  She uses alarm on her phone to reminder her to take pill, still difficult at times due to her erractic daily schedule.  She ran out of ocps about 1 month ago.  Has not been sexually active since then.  Uses condoms as back up to ocp.  She had a fair number of headaches as a child.  Never diagnosed as migraines.  The she feel down stairs in .  Did not hit her head, but landed hard on her butt.  No LOC, but she was confused afterward and had a hard time getting out of bed.  Her roommates brought her to ED.  She was told to rest more, work less, decrease screen time.  She has had frequent headaches since concussion.  Has tried PT, but chiropractor has been most helpful.  Takes sumatriptan on occasion.  Reviewed sx of migraine aura and she denies that she has ever had these symptoms.  Ocps do not make her headaches worse or more frequent.  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  Lives off campus with roommates.  Has 1-2 drinks on occasion.  No tobacco or drug use.  Admits that her diet is poor (hamburgers from gas station, junk  food snacks).  Diet is poor because of cost and time constraints on preparing food.  PHQ9 score is 4, no thoughts of self-harm.            Review of Systems   ROS reviewed as documented in chart   ROS negative except as documented in Well Adult History      Health Status   Allergies:    Allergic Reactions (Selected)  Severity Not Documented  Pollen (No reactions were documented)   Medications:  (Selected)   Prescriptions  Prescribed  Mononessa 0.25 mg-35 mcg oral tablet: 1 tab(s), po, daily, # 90 tab(s), 3 Refill(s), Type: Maintenance, Pharmacy: [id number], Due for visit before any more refills, 1 tab(s) po daily  SUMAtriptan 25 mg oral tablet: See Instructions, Instructions: 2 tab(s) once  may repeat dose in 2 hours if needed, # 9 tab(s), 1 Refill(s), Type: Soft Stop, Pharmacy: [id number], 2 tab(s) once; may repeat dose in 2 hours if needed  Ventolin HFA 90 mcg/inh inhalation aerosol: 2 puff(s) ( 180 mcg ), INH, QID, PRN: for shortness of breath or wheezing, # 18 gm, 5 Refill(s), Type: Maintenance, [facility], 2 puff(s) inh qid,PRN:for shortness of breath or wheezing  Documented Medications  Documented  propranolol: ( 60 mg ), 0 Refill(s), Type: Maintenance   Problem list:    All Problems  Asthma / SNOMED CT 866753903 / Confirmed  Dysmenorrhea in adolescent / SNOMED CT 927742520 / Confirmed  Dysmenorrhea / SNOMED CT 592118919 / Confirmed  Headache / SNOMED CT 46174267 / Confirmed  Resolved: Concussion with no loss of consciousness / SNOMED CT 743510636      Histories   Past Medical History:    Active  Dysmenorrhea (233812574)  Headache (27193442)  Asthma (907235324)  Resolved  Concussion with no loss of consciousness (016230750): Onset in 2015 at 20 years.  Resolved.   Family History:       Procedure history:    No active procedure history items have been selected or recorded.   Social History:        Alcohol Assessment            Current, 1-2 times per  month, 1 drinks/episode average.  2 drinks/episode maximum.  Drinks more than               intended: No.      Tobacco Assessment            Never      Substance Abuse Assessment            Never      Employment and Education Assessment            Student, Work/School description: [employer], studying elementary education. Plans to graduate in Dec 2018.  Plans               grad school..      Home and Environment Assessment            Marital status: Single.  Lives with Roomate(s)/Friend(s).  Feels unsafe at home: No.  Family/Friends               available for support: Yes.      Nutrition and Health Assessment            Type of diet: Regular.      Exercise and Physical Activity Assessment            Exercise frequency: Never.      Sexual Assessment            Sexually active: Yes.  Identifies as female, Sexual orientation: Straight or heterosexual.  Uses condoms:               Yes.  Contraceptive Use Details: Birth control pill.        Physical Examination   Vital Signs   1/4/2018 9:54 AM CST Peripheral Pulse Rate 87 bpm    HR Method Electronic    Systolic Blood Pressure 112 mmHg    Diastolic Blood Pressure 66 mmHg    Mean Arterial Pressure 81 mmHg    BP Site Right arm    BP Method Electronic      Measurements from flowsheet : Measurements   1/4/2018 9:54 AM CST Height Measured - Standard 61.5 in    Weight Measured - Standard 120 lb    BSA 1.54 m2    Body Mass Index 22.3 kg/m2    Ht/Wt Measurement Refused by Patient? No      General:  Alert and oriented, No acute distress.    Eye:  Normal conjunctiva, Vision unchanged.    HENT:  Normocephalic, Normal hearing, Oral mucosa is moist, No pharyngeal erythema.    Neck:  Supple, Non-tender, No lymphadenopathy, No thyromegaly.    Respiratory:  Lungs are clear to auscultation, Respirations are non-labored, Breath sounds are equal.    Cardiovascular:  Normal rate, Regular rhythm, No murmur, No edema.    Breast:  No mass, No tenderness, No discharge.    Gastrointestinal:  Soft,  Non-tender, Non-distended, Normal bowel sounds.    Gynecology:          Labia: Bilateral, Within normal limits.         Vagina: Within normal limits, No lesions.         Cervix: Os ( Closed ), No cervical motion tenderness.         Uterus: Mobile, Not enlarged, Not tender.         Ovaries: Bilateral, Within normal limits.    Musculoskeletal:  Normal range of motion, Normal strength, Normal gait.    Integumentary:  Warm, Dry, Intact, No rash.    Neurologic:  Alert, Oriented.    Psychiatric:  Cooperative, Appropriate mood & affect.       Health Maintenance   Immunizations   due for flu shot, needs Mantoux      Review / Management   Results review      Impression and Plan   Diagnosis     Well woman exam (WSX74-OB Z01.419).     Encounter for contraceptive management (QTY42-HN Z30).     Headache (GFD50-TI R51).     Plan   Labs today: pap smear with reflex HPV, GC, CT.  Mantoux placed by RN.  Refill on ocp and sumatriptan.  Declines flu vaccine.  Discussed healthy diet and choosing vegetables, fruit, hummus and pretzels as healthy snack foods.  Declines visit with DEANN.

## 2022-02-15 NOTE — TELEPHONE ENCOUNTER
Entered by Sakshi Ordaz RN on November 10, 2020 8:22:29 AM CST  ---------------------  From: Sakshi Ordaz RN   To: [place] [address] [facility]    Sent: 11/10/2020 8:22:29 AM CST  Subject: Medication Management     ** Rx Change Denied: Needs appointment for more than one month refill amount. **  (SPRINTEC 28 DAY TABLET)   TAKE 1 TABLET BY MOUTH EVERY DAY  Qty:  28 tab(s)        Days Supply:  28        Refills:  0          Substitutions Allowed     Route To Pharmacy - [place] [address] [facility]   Note from Pharmacy:  Alternative Requested:PLEASE SEND FOR 3 PACKAGES  Signed by Sakshi Ordaz RN            From: [place] [address] [facility]  To: Missy Ford MD  Sent: November 9, 2020 2:07:37 PM CST  Subject: Medication Management  Due: November 10, 2020 2:07:37 PM CST     Originally Prescribed Drug:  Drug: ethinyl estradiol-norgestimate (Sprintec 0.25 mg-35 mcg oral tablet), TAKE 1 TABLET BY MOUTH EVERY DAY  Quantity: 28 tab(s)  Days Supply: 28  Refills: 0  Substitutions Allowed  Notes from Pharmacy: Alternative Requested:PLEASE SEND FOR 3 PACKAGES     ** On Hold Pending Signature **  Preferred Alternative Drug: ethinyl estradiol-norgestimate (Sprintec 0.25 mg-35 mcg oral tablet), TAKE 1 TABLET BY MOUTH EVERY DAY  Quantity: 28 tab(s)  Days Supply: 28  Refills: 0  Substitutions Allowed  Notes from Pharmacy: Alternative Requested:PLEASE SEND FOR 3 PACKAGESFilled for one month supply on 11/9/20.  Unable to fill for 3 month supply because pt needs appt.  Message sent to pharmacy

## 2022-02-15 NOTE — TELEPHONE ENCOUNTER
---------------------  From: Missy Ford MD   To: [facility] Message Pool ([id number]_[place]);     Sent: 9/25/2019 4:18:46 PM CDT  Subject: General Message     please let pt know I missed her today, would love to get together to discuss contraception optionsattempted to call  patient at [phone]. No VM box.

## 2022-02-15 NOTE — TELEPHONE ENCOUNTER
Entered by Argentina Street RN on December 21, 2021 12:27:45 PM CST  ---------------------  From: Argentina Street RN   [id number]    Sent: 12/21/2021 12:27:45 PM CST  Subject: Medication Management     ** Submitted: **  Order:ethinyl estradiol-norgestimate (Estarylla 0.25 mg-35 mcg oral tablet)  See Instructions  TAKE 21 DAYS OF ACTIVE TABLETS, SKIP PLACEBO TABLETS AND START NEXT PACK AS DIRECTED.  Qty:  28 tab(s)        Days Supply:  21        Refills:  0          Substitutions Allowed     Route To [email or web address] #[id number]    Signed by Argentina Street RN  12/21/2021 6:27:00 PM [facility]    ** Submitted: **  Complete:ethinyl estradiol-norgestimate (Estarylla 0.25 mg-35 mcg oral tablet)   Signed by Argentina Street RN  12/21/2021 6:27:00 PM [facility]    ** Not Approved:  **  ethinyl estradiol-norgestimate (ESTARYLLA TABLETS 28S)  TAKE 21 DAYS OF ACTIVE TABLETS, SKIP PLACEBO TABLETS AND START NEXT PACK AS DIRECTED.  Qty:  28 tab(s)        Days Supply:  21        Refills:  0          Substitutions Allowed     Route To [email or web address] #[id number]   Signed by Argentina Street RN            ------------------------------------------  From: [email or web address] #[id number]  To: Missy Ford MD  Sent: December 20, 2021 3:44:36 AM CST  Subject: Medication Management  Due: December 15, 2021 8:20:29 PM CST     ** On Hold Pending Signature **     Dispensed Drug: ethinyl estradiol-norgestimate (Estarylla 0.25 mg-35 mcg oral tablet), TAKE 21 DAYS OF ACTIVE TABLETS, SKIP PLACEBO TABLETS AND START NEXT PACK AS DIRECTED.  Quantity: 28 tab(s)  Days Supply: 21  Refills: 0  Substitutions Allowed  Notes from Pharmacy:  ------------------------------------------Medication Refill    PCP:  [facility]    Name of med requested:  Sona    Date of last office visit and reason:  5/3/21 left otitis media GJM  Date of last Med Check / Px:   12/17/2020     Date of last labs pertaining to med:  1/2020    RTC order in chart:  yes,  has well adult appt scheduled 12/28/21    For Protocol refill, has patient been contacted:

## 2022-02-15 NOTE — NURSING NOTE
CAGE Assessment Entered On:  12/1/2020 6:35 PM CST    Performed On:  12/1/2020 6:34 PM CST by Blanca Damian CMA               Assessment   Have you ever felt you should cut down on your drinking :   No   Have people annoyed you by criticizing your drinking :   No   Have you ever felt bad or guilty about your drinking :   No   Have you ever taken a drink first thing in the morning to steady your nerves or get rid of a hangover (Eye-opener) :   No   CAGE Score :   0    Blanca Damian CMA - 12/1/2020 6:34 PM CST

## 2022-02-15 NOTE — PROGRESS NOTES
Chief Complaint    Pt here today for med refill.  History of Present Illness      Ptient present to  refill OCP.  She has been using OCP for contraception.  She denies possibility of pregnancy and declines UPT today.  Is aware of various alternatives for contraception including nexplanon, OCP, nuvaring, patch, Depo Provera, IUD and IUS, NFP, diaphragm, condoms, abstinence and sterilization. She is aware of risks associated with estrogen including stroke, DVT, PE, CVA, MI and would like to proceed with OCP refill.    Also counseled patient that all patients of reproductive age should be taking 400 mcg folic acid daily to reduce risks of 2 birth defects.      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 and met with the neurology team and reports that they have started her on medications and she has gained back almost all of her function but still has some residual left hand paresthesias.  She feels very comfortable with her care and glad that she has a good team to help her.  She absolutely does not want to get pregnant due to her medications and also based on her age.  She does remove her take birth control pill daily.  She has not had any further exacerbations.  We did discuss that I would be concerned about high-dose prednisone and estrogen probably increasing risk of coagulopathy.  If she does have another exacerbation would be worthwhile to review this with her neuro team and possibly her pharmacist.  She does have a pharmacist from the [facility] that gives her a call to follow-up with her on her neuro meds and plans to talk with her pharmacist about this possible increased risk of clotting.      We also discussed that recommendations for frequency of cervical cancer screening is based on people with a an intact immune system.  Having impaired immune system due to her medications may  increase her risk of cervical cancer so we could certainly consider doing a Pap smear annually instead of waiting for 3 years.  She would like to do a Pap smear today for cervical cancer screening if possible.      On exam she appeared to have a very small endocervical polyp.  She does report that she will have intermittent vaginal spotting that seems to occur a few days after intercourse.  She does not skip her birth control pills and there has not really been a pattern to this.      Review of systems is negative except as per HPI including:  no fevers, chills, sore throat, runny nose, nausea, vomiting, constipation, diarrhea, rash or new skin lesions, chest pain, palpitations, slurred speech, new paresthesia, shortness of breath or wheeze. she also denies and genital lesions or sores or discharge or itcing, no pelvic pain.      Exam:      General: alert and oriented ×3 no acute distress.      HEENT: pupils are equal round and reactive to light extraocular motion is intact. Normocephalic and atraumatic.       Hearing is grossly normal and there is no otorrhea.       Nares are patent there is no rhinorrhea.       Mucous membranes are moist and pink.      Chest: has bilateral rise with no increased work of breathing.      Cardiovascular: normal perfusion and brisk capillary refill.      Musculoskeletal: no gross focal abnormalities and normal gait.  Moving all 4 extremities.       Neuro: no gross focal abnormalities and memory seems intact.      Psychiatric: speech is clear and coherent and fluent. Patient dressed appropriately for the weather. Mood is appropriate and affect is full.       normal external female genitalia.  No vaginal odor, normal physiological vaginal secretions, nulliparous cervix however this appears to be a 4 to 5 mm friable polyp that is just inside the cervical os, her Bartholin's and Waves's glands are normal, she has no labial adhesions.  Clitoris and clitoral enriquez both appear normal.               Discussed with patient to return to clinic if symptoms worsen or do not improve, use condoms for back up for the first month to reduce risk of pregnancy and always use condoms to reduce risk of STD transmission.  Use sunscreen to reduce risk of skin cancer, refer to my healthy plate for information regarding diet and American Heart association web site for exercise recommendations.       using sunscreen, protecting from sunburn, monthly self skin checks      taking folic acid 400 mcg daily      refer to usdachoosemyplate.gov, AHA and ADA for diet and exercise recommendations      consume [id number] mg calcium daily      std screening      regular self skin checks      RTC in the Fall for flu shot  Physical Exam   Vitals & Measurements    HR: 68(Peripheral)  BP: 117/72     HT: 62 in  WT: 126.4 lb  BMI: 23.12   Assessment/Plan       Cervical cancer screening (Z12.4)        We will plan to do annual Pap smears while she is on immunosuppressant.  Certainly if patient decides to not do this will be supportive of her.         Ordered:          20478 office outpatient visit 40 minutes (Charge), Quantity: 1, Counseling for birth control, oral contraceptives  Long-term use of immunosuppressant medication  Multiple sclerosis  Encounter for immunization  Cervical cancer screening  Cervical polyp                Cervical polyp (N84.1)        Patient is going to monitor her spotting and see if there is a relationship between this and when she has intercourse.  We will recheck in about a month to see if the small mass is easier to identify a.  We can do this in the culprit room where we have an endocervical speculum available.  If present and were able to grab it with some readings we will plan to remove it.         Ordered:          95343 office outpatient visit 40 minutes (Charge), Quantity: 1, Counseling for birth control, oral contraceptives  Long-term use of immunosuppressant medication  Multiple sclerosis   Encounter for immunization  Cervical cancer screening  Cervical polyp                Contraception management (Z30.9)                Counseling for birth control, oral contraceptives (Z30.09)        We will renew her oral contraceptive pill.  She will also follow-up with her neuro pharmacist to discuss possible drug interactions.  She will update me if we need to make any changes.         Ordered:          78173 office outpatient visit 40 minutes (Charge), Quantity: 1, Counseling for birth control, oral contraceptives  Long-term use of immunosuppressant medication  Multiple sclerosis  Encounter for immunization  Cervical cancer screening  Cervical polyp                Encounter for immunization (Z23)         Ordered:          influenza virus vaccine, inactivated, 0.5 mL, IM, once, (Completed)          46439 imadm prq id subq/im njxs 1 vaccine (Charge), Quantity: 1, Encounter for immunization          38711 influenza vac 4 valent prsrv free 3 yrs plus im (Charge), Quantity: 1, Encounter for immunization          35823 office outpatient visit 40 minutes (Charge), Quantity: 1, Counseling for birth control, oral contraceptives  Long-term use of immunosuppressant medication  Multiple sclerosis  Encounter for immunization  Cervical cancer screening  Cervical polyp                Long-term use of immunosuppressant medication (Z79.899)        Provided with flu shot today.  We will do more frequent Pap smears.  She continues to follow with neurology         Ordered:          15083 office outpatient visit 40 minutes (Charge), Quantity: 1, Counseling for birth control, oral contraceptives  Long-term use of immunosuppressant medication  Multiple sclerosis  Encounter for immunization  Cervical cancer screening  Cervical polyp                Multiple sclerosis (G35)        She continues to follow with neurology         Ordered:          42080 office outpatient visit 40 minutes (Charge), Quantity: 1, Counseling  for birth control, oral contraceptives  Long-term use of immunosuppressant medication  Multiple sclerosis  Encounter for immunization  Cervical cancer screening  Cervical polyp                Orders:         ethinyl estradiol-norgestimate, 1 tab(s), Oral, daily, # 84 tab(s), 3 Refill(s), Type: Maintenance, Pharmacy: [facility], due for visit, 1 tab(s) Oral daily, (Ordered)         Return to Clinic (Request), RFV: possible polypectomy Colpo room- endocervical spec, Return in 4 weeks         ThinPrep Imaging Pap reflex HPV mRNA E6/E7* ([facility]), Specimen Type: Pap, Collection Date: 10/31/19 11:27:00 CDT      40 minutes spent with patient today in direct face-to-face contact of which greater than 50% of time spent counseling patient and coordinating care.  Patient Information     Name:SOFIA BOB      Address:      [address] [address] [place] [id number]     Sex:Female     YOB: 1995     [phone]     Emergency Contact:ABIGAIL BOB     MRN:[id number]     FIN:[id number]     [facility]     Date of Service:10/31/2019      Primary Care Physician:       Missy Ford MD, [phone]      Attending Physician:       Missy Ford MD, [phone]  Problem List/Past Medical History    Ongoing     Asthma     Cervical polyp     Dysmenorrhea     Dysmenorrhea in adolescent     Headache     Long-term use of immunosuppressant medication     Multiple sclerosis    Historical     Concussion with no loss of consciousness  Medications    Estarylla 0.25 mg-35 mcg oral tablet, 1 tab(s), Oral, daily, 3 refills    propranolol, 60 mg    Rollator, See Instructions    SUMAtriptan 25 mg oral tablet, See Instructions, 1 refills    Ventolin HFA 90 mcg/inh inhalation aerosol, 180 mcg= 2 puff(s), Inhale, qid, PRN, 5 refills    Walker, See Instructions    Wheelchair, See Instructions  Allergies    Pollen  Social History    Smoking Status - 04/10/2019     Never smoker     Alcohol       Current, 1-2 times per month, 1 drinks/episode average. 2 drinks/episode maximum. Drinks more than intended: No., 05/07/2018     Employment/School      Student, Work/School description: [employer], studying elementary education. Plans to graduate in Dec 2018. Plans grad school.., 01/04/2018     Exercise      Exercise frequency: Never., 04/03/2015     Home/Environment      Marital status: Single. Family/Friends available for support: Yes. Risks in environment: owns secured gun., 01/04/2018     Nutrition/Health      Type of diet: Regular., 04/03/2015     Sexual      Sexually active: Yes. Identifies as female, Sexual orientation: Straight or heterosexual. Uses condoms: Yes. Contraceptive Use Details: Birth control pill., 01/04/2018     Substance Abuse      Never, 04/03/2015     Tobacco      Never, 04/03/2015  Family History    Mother: History is negative    Father: History is negative    Sister: History is negative  Immunizations      Vaccine Date Status      influenza virus vaccine, inactivated 10/31/2019 Given      tetanus/diphth/pertuss (Tdap) adult/adol 05/03/2018 Given      influenza virus vaccine, inactivated 11/28/2016 Given      human papillomavirus vaccine 03/18/2009 Recorded      influenza virus vaccine, inactivated 10/27/2008 Recorded      human papillomavirus vaccine 08/28/2008 Recorded      tetanus/diphth/pertuss (Tdap) adult/adol 06/24/2008 Recorded      human papillomavirus vaccine 06/24/2008 Recorded      meningococcal conjugate vaccine 06/24/2008 Recorded      DTP (obsolete) 07/27/2001 Recorded      MMR (measles/mumps/rubella) 07/27/2001 Recorded      varicella 07/27/2001 Recorded      OPV 07/27/2001 Recorded      DTP (obsolete) 07/18/1997 Recorded      MMR (measles/mumps/rubella) 07/18/1997 Recorded      MMR (measles/mumps/rubella) 07/18/1997 Recorded      OPV 07/18/1997 Recorded      hepatitis B pediatric vaccine 07/18/1997 Recorded      Hib (PRP-T) 07/18/1997 Recorded      DTaP 01/24/1997 Recorded       DTP-Hib 01/24/1997 Recorded      OPV 1996 Recorded      DTP-Hib 1996 Recorded      Hib 1996 Recorded      OPV 1996 Recorded      DTaP 1996 Recorded      DTP-Hib 1996 Recorded      hepatitis B pediatric vaccine 1996 Recorded      hepatitis B pediatric vaccine 1996 Recorded

## 2022-02-15 NOTE — TELEPHONE ENCOUNTER
---------------------  From: Blanca Damian CMA   To: Phone Messages Pool ([id number] - [place]);     Sent: 7/9/2019 7:14:48 PM CDT  Subject: update     LM for pt per P request to see how pt was doing.

## 2022-02-15 NOTE — NURSING NOTE
Comprehensive Intake Entered On:  1/27/2020 5:44 PM CST    Performed On:  1/27/2020 5:39 PM CST by Abbi He LPN               Summary   Chief Complaint :   pre-op exam - exlporatory surgery of the uterus on 1/30/20 at [facility] with Dr Guerrero   Menstrual Status :   Menarcheal   Weight Measured :   125.4 lb(Converted to: 125 lb 6 oz, 56.88 kg)    Height Measured :   62 in(Converted to: 5 ft 2 in, 157.48 cm)    Body Mass Index :   22.93 kg/m2   Body Surface Area :   1.58 m2   Systolic Blood Pressure :   116 mmHg   Diastolic Blood Pressure :   70 mmHg   Mean Arterial Pressure :   85 mmHg   Peripheral Pulse Rate :   99 bpm   BP Site :   Right arm   BP Method :   Manual   Temperature Tympanic :   98.6 DegF(Converted to: 37.0 DegC)    Oxygen Saturation :   97 %   Abbi He LPN - 1/27/2020 5:39 PM CST   Health Status   Allergies Verified? :   Yes   Medication History Verified? :   Yes   Medical History Verified? :   No   Pre-Visit Planning Status :   Completed   Tobacco Use? :   Never smoker   Abbi He LPN - 1/27/2020 5:39 PM CST   Meds / Allergies   (As Of: 1/27/2020 5:44:16 PM CST)   Allergies (Active)   Pollen  Estimated Onset Date:   Unspecified ; Created By:   Candis Lr MA; Reaction Status:   Active ; Category:   Drug ; Substance:   Pollen ; Type:   Allergy ; Updated By:   Candis Lr MA; Reviewed Date:   10/31/2019 10:37 AM CDT        Medication List   (As Of: 1/27/2020 5:44:16 PM CST)   Prescription/Discharge Order    ethinyl estradiol-norgestimate  :   ethinyl estradiol-norgestimate ; Status:   Prescribed ; Ordered As Mnemonic:   Estarylla 0.25 mg-35 mcg oral tablet ; Simple Display Line:   1 tab(s), Oral, daily, 84 tab(s), 3 Refill(s) ; Ordering Provider:   Missy Ford MD; Catalog Code:   ethinyl estradiol-norgestimate ; Order Dt/Tm:   12/27/2019 3:45:16 PM CST          albuterol  :   albuterol ; Status:   Prescribed ; Ordered As Mnemonic:   Ventolin HFA 90  mcg/inh inhalation aerosol ; Simple Display Line:   180 mcg, 2 puff(s), INH, QID, PRN: for shortness of breath or wheezing, 18 gm, 5 Refill(s) ; Ordering Provider:   Dianne Esquivel; Catalog Code:   albuterol ; Order Dt/Tm:   11/28/2016 12:21:08 PM CST          SUMAtriptan  :   SUMAtriptan ; Status:   Prescribed ; Ordered As Mnemonic:   SUMAtriptan 25 mg oral tablet ; Simple Display Line:   See Instructions, 2 tab(s) once  may repeat dose in 2 hours if needed, 9 tab(s), 1 Refill(s) ; Ordering Provider:   Christie De Dios; Catalog Code:   SUMAtriptan ; Order Dt/Tm:   1/4/2018 10:23:56 AM CST          Miscellaneous Rx Supply  :   Miscellaneous Rx Supply ; Status:   Prescribed ; Ordered As Mnemonic:   Walker ; Simple Display Line:   See Instructions, use as directed, 1 EA, 0 Refill(s) ; Ordering Provider:   Missy Ford MD; Catalog Code:   Miscellaneous Rx Supply ; Order Dt/Tm:   6/24/2019 7:20:04 PM CDT          Miscellaneous Rx Supply  :   Miscellaneous Rx Supply ; Status:   Prescribed ; Ordered As Mnemonic:   Wheelchair ; Simple Display Line:   See Instructions, use as directed, 1 EA, 0 Refill(s) ; Ordering Provider:   Missy Ford MD; Catalog Code:   Miscellaneous Rx Supply ; Order Dt/Tm:   6/24/2019 7:20:04 PM CDT            Home Meds    dimethyl fumarate  :   dimethyl fumarate ; Status:   Documented ; Ordered As Mnemonic:   Tecfidera 240 mg oral delayed release capsule ; Simple Display Line:   240 mg, 1 cap(s), Oral, bid, 0 Refill(s) ; Catalog Code:   dimethyl fumarate ; Order Dt/Tm:   12/6/2019 2:15:57 PM CST

## 2022-02-15 NOTE — RESULTS
-------------------------------- Original Report -------------------------------    EXAM:  MRI BRAIN WITH AND WITHOUT CONTRAST    CLINICAL INFORMATION:  Loss of function left-sided body with difficulty walking.  History of migraine headaches. Symptoms ongoing since 6/23/2019. Patient has  attempted chiropractic therapy.    TECHNICAL INFORMATION:  Sagittal MPR, axial flair, T2-weighted BLADE,  diffusion-weighted, susceptibility weighted, T1-weighted and postcontrast axial  T1-weighted and sagittal MPR images of the brain were obtained on a 3.0 Gema  MRI scanner. Axial and coronal reformatted images were then generated based upon  the sagittally acquired pre and postcontrast MPR data. SEDATION: None.   CONTRAST: Examination was performed with the concomitant and uneventful  administration of intravenous gadolinium (Dotarem 10 mL).     INTERPRETATION:  Comparison CT head examination dated 6/23/2019 from her [facility] clinic.    There is a relatively well-circumscribed focus of T1 and T2 prolongation  demonstrated within the right thalamus/upper midbrain and bridging into the  posterior limb of the right internal capsule best seen on series 9 images 16  through 20 measuring up to 1.3 x 1.9 cm in oblique AP and transverse dimension.  There is some associated internal, heterogeneous enhancement. This focus also  demonstrates increased signal on diffusion-weighted images, but intermediate to  increased signal on corresponding ADC maps. This was not readily present on the  prior study, accounting for differences in modality. There is no significant  associated mass effect.    Additional foci of T2 prolongation are demonstrated in the right dorsolateral  centrum semiovale (2 additional foci in this location as seen on series 9 image  25), deep white matter of the left parietal lobe extending into the left  periatrial periventricular white matter (series 9 image 21 and 22),  subcortically in the left frontal lobe,  in the left dorsolateral nicole (series 9  image 10) and in the inferior aspect of left cerebellar hemisphere (series 9  image 8). None of these foci exhibit abnormal internal enhancement or abnormally  restricted diffusion. There on no areas of abnormal meningeal enhancement. There  is no midline shift and the basal cisterns are widely patent. Ventricles and  sulci are normal in size and configuration. Intracranial arterial flow voids are  present and the dural venous sinuses are patent. There are no abnormal  extra-axial fluid collections. Limited images through the upper cervical spine  demonstrate heterogeneous signal in the cervical cord at the C3 level as is most  apparent on series 5 image 69.    Calvarial marrow signal is normal throughout. Imaged skull base soft tissue  structures and portions of the orbits are unremarkable. Nonspecific fluid signal  is demonstrated in several left and a small number of right mastoid air cells,  but the tympanic cavities are well aerated. Mild mucosal thickening is  demonstrated in the right sphenoid sinus.    CONCLUSION:    1. Multiple areas of abnormal T2 hyperintensity throughout the cerebral  hemispheres, cerebellum and nicole with dominant focus also demonstrating some  internal enhancement bridging the right midbrain/thalamus and posterior limb of  right internal capsule and suggestion of similar signal abnormalities in the  upper cervical cord at C3. Given this distribution and appearance, these changes  are most likely representative of underlying demyelinating disease such as  multiple sclerosis. Cervical and thoracic cord would be better evaluated with  dedicated MRI examination with and without contrast through each of these  regions.  2. No intracranial hemorrhage or acute or subacute infarction.  3. Mild right sphenoid sinus mucosal thickening.    SC      Read by: Darryn Estrella M.D.  Reviewed and Electronically Signed by: Darryn Estrella M.D.

## 2022-02-15 NOTE — PROGRESS NOTES
Chief Complaint    Here for follow-up on sore throat and fever. Starting to feel better.  History of Present Illness      Became sick a week ago fever and sore throat. Symptoms increased over the weekend. Developed a headache. Started on augmentin and prednisone three days ago with significant improvement. Able to eat now.       Uses albuterol inhaler 1-2x a month. Sometimes more in the summer as more active   Review of Systems      No vomiting       No rashes       ACT 22pts  Physical Exam   Vitals & Measurements    T: 97.7   F (Temporal Artery)  HR: 68(Peripheral)  BP: 106/60     HT: 62 in  WT: 122 lb  BMI: 22.31       General: No acute distress      Musculoskeletal: Normal gait      Ears: Left membrane is normal.  Right canal is occluded by cerumen      Oropharynx: Minimal erythema over the bilateral tonsils.  Minimal exudate and one small ulceration on the right tonsillar pillar.       Neck: Without lymphadenopathy       Lungs: Clear       Heart: Regular rate and rhythm  Assessment/Plan   Tonsillitis: Improving on Augmentin and prednisone.  Strep culture was negative.  Continue Augmentin and prednisone and follow-up as needed  Patient Information     Name:SOFIA BOB      Address:      [address] [place] [id number]     Sex:Female     YOB: 1995     [phone]     Emergency Contact:ABIGAIL BOB     MRN:[id number]     FIN:[id number]     Location:[facility]     Date of Service:05/03/2018      Primary Care Physician:       NONE ,       Attending Physician:       Bridger Maddox MD, [phone]  Problem List/Past Medical History    Ongoing     Asthma     Dysmenorrhea     Dysmenorrhea in adolescent     Headache    Historical     Concussion with no loss of consciousness  Medications        propranolol: 60 mg.        Ventolin HFA 90 mcg/inh inhalation aerosol: 180 mcg, 2 puff(s), INH, QID, PRN: for shortness of breath or wheezing, 18 gm, 5 Refill(s).         SUMAtriptan 25 mg oral tablet: See Instructions, 2 tab(s) once  may repeat dose in 2 hours if needed, 9 tab(s), 1 Refill(s).        Mononessa 0.25 mg-35 mcg oral tablet: 1 tab(s), po, daily, 90 tab(s), 3 Refill(s).        Augmentin 875 mg oral tablet: 1 tab(s), PO, q12hr, for 10 day(s), with food or milk, 20 tab(s), 0 Refill(s).        predniSONE 10 mg oral tablet: 30 mg, 3 tab(s), PO, Daily, for 7 day(s), 21 tab(s), 0 Refill(s).                Allergies    Pollen

## 2022-02-15 NOTE — PROGRESS NOTES
Chief Complaint    126c/o left ear pain since Sunday morning.  denies fever, sore throat.  does have head congestion.  History of Present Illness      Patient is here with left ear ache since Sunday, is having some drainage.  She also states that her hearing is muffled.  Did go swimming over the weekend and got water in her ear.  She does have history of having PE tubes.  Denies having fever and sore throat.  Review of Systems           See HPI.  All other review of systems negative.              Physical Exam   Vitals & Measurements    T: 98.1   F (Tympanic)  HR: 72(Peripheral)  BP: 124/60     HT: 62 in           General:  Alert and oriented, No acute distress.            Eye:  Pupils are equal, round and reactive to light, Normal conjunctiva.            HENT:  Oral mucosa is moist.  TM ruptured left ear, drainage in left canal          Integumentary:  Warm, No rash.            Psychiatric:  Cooperative, Appropriate mood & affect, Normal judgment.             Assessment/Plan       1. Ruptured ear drum (H66.012)         Will treat with Amoxicillin 875mg bid x7 days.  Follow up if symptoms don't improve or worsen over the next 7-10 days.       Orders:         amoxicillin, = 1 tab(s) ( 875 mg ), PO, BID, x 7 day(s), # 14 tab(s), 0 Refill(s), Type: Acute, Pharmacy: [facility], 1 tab(s) Oral bid,x7 day(s), (Ordered)      Katelyn BOWEN MA, acted solely as a scribe for, and in the presence of Dr. Bridger Sargent who performed the services.             IBridger MD, personally performed the services described in this documentation.  The documentation was scribed in my presence and is both accurate and complete.                Patient Information     Name:SOFIA BOB      Address:      [address] [place] [id number]     Sex:Female     YOB: 1995     [phone]     Emergency Contact:ABIGAIL BOB     MRN:[id number]     FIN:[id number]      Location:[facility]     Date of Service:04/10/2019      Primary Care Physician:       NONE ,       Attending Physician:       Bridger Sargent MD, [phone]  Problem List/Past Medical History    Ongoing     Asthma     Dysmenorrhea     Dysmenorrhea in adolescent     Headache    Historical     Concussion with no loss of consciousness  Medications     propranolol: 60 mg.     Ventolin HFA 90 mcg/inh inhalation aerosol: 180 mcg, 2 puff(s), INH, QID, PRN: for shortness of breath or wheezing, 18 gm, 5 Refill(s).     SUMAtriptan 25 mg oral tablet: See Instructions, 2 tab(s) once  may repeat dose in 2 hours if needed, 9 tab(s), 1 Refill(s).     Estarylla 0.25 mg-35 mcg oral tablet: 1 tab(s), Oral, daily, 84 tab(s), 3 Refill(s).     amoxicillin 875 mg oral tablet: 875 mg, 1 tab(s), PO, BID, for 7 day(s), 14 tab(s), 0 Refill(s).          Allergies    Pollen  Social History    Smoking Status - 04/10/2019     Never smoker     Alcohol      Current, 1-2 times per month, 1 drinks/episode average. 2 drinks/episode maximum. Drinks more than intended: No., 05/07/2018     Employment and Education      Student, Work/School description: [employer], studying elementary education. Plans to graduate in Dec 2018. Plans grad school.., 01/04/2018     Exercise and Physical Activity      Exercise frequency: Never., 04/03/2015     Home and Environment      Marital status: Single. Family/Friends available for support: Yes. Risks in environment: owns secured gun., 01/04/2018     Nutrition and Health      Type of diet: Regular., 04/03/2015     Sexual      Sexually active: Yes. Identifies as female, Sexual orientation: Straight or heterosexual. Uses condoms: Yes. Contraceptive Use Details: Birth control pill., 01/04/2018     Substance Abuse      Never, 04/03/2015     Tobacco      Never, 04/03/2015  Family History    Mother: History is negative    Father: History is negative    Sister: History is negative  Immunizations       Vaccine Date Status      tetanus/diphth/pertuss (Tdap) adult/adol 05/03/2018 Given      influenza virus vaccine, inactivated 11/28/2016 Given      human papillomavirus vaccine 03/18/2009 Recorded      influenza virus vaccine, inactivated 10/27/2008 Recorded      human papillomavirus vaccine 08/28/2008 Recorded      tetanus/diphth/pertuss (Tdap) adult/adol 06/24/2008 Recorded      human papillomavirus vaccine 06/24/2008 Recorded      meningococcal conjugate vaccine 06/24/2008 Recorded      DTP (obsolete) 07/27/2001 Recorded      MMR (measles/mumps/rubella) 07/27/2001 Recorded      varicella 07/27/2001 Recorded      OPV 07/27/2001 Recorded      DTP (obsolete) 07/18/1997 Recorded      MMR (measles/mumps/rubella) 07/18/1997 Recorded      MMR (measles/mumps/rubella) 07/18/1997 Recorded      OPV 07/18/1997 Recorded      hepatitis B pediatric vaccine 07/18/1997 Recorded      Hib (PRP-T) 07/18/1997 Recorded      DTaP 01/24/1997 Recorded      DTP-Hib 01/24/1997 Recorded      OPV 1996 Recorded      DTP-Hib 1996 Recorded      Hib 1996 Recorded      OPV 1996 Recorded      DTaP 1996 Recorded      DTP-Hib 1996 Recorded      hepatitis B pediatric vaccine 1996 Recorded      hepatitis B pediatric vaccine 1996 Recorded

## 2022-02-15 NOTE — PROCEDURES
Accession Number:       [id number]  CLINICAL INFORMATION::     None given  LMP::     NONE GIVEN  PREV. PAP::     NONE GIVEN  PREV. BX::     NONE GIVEN  SOURCE::     None given  STATEMENT OF ADEQUACY::     Satisfactory for evaluation. Endocervical/transformation zone component present. Age and/or menstrual status not provided  GENERAL CATEGORIZATION::     EPITHELIAL CELL ABNORMALITY  INTERPRETATION/RESULT::     Atypical Squamous Cells of Undetermined Significance (ASC-US)  COMMENT::     See comment       This Pap test has been evaluated with computer       assisted technology.       Suggest clinical correlation and follow-up as       clinically appropriate  CYTOTECHNOLOGIST::     FERNIE CT(ASCP) CT Screening location: [address] [address]  PATHOLOGIST::     See comment       Quan Kimball M.D., Board Certified in Anatomic       Pathology, Clinical Pathology, Specializing in       Gynecological Pathology       4   (electronic signature)  COMMENT:     See comment       EXPLANATORY NOTE:         The Pap is a screening test for cervical cancer. It is       not a diagnostic test and is subject to false negative       and false positive results. It is most reliable when a       satisfactory sample, regularly obtained, is submitted       with relevant clinical findings and history, and when       the Pap result is evaluated along with historic and       current clinical information.  HPV mRNA E6/E7:     Not Detected       This test was performed using the APTIMA HPV Assay ([employer]       This assay detects E6/E7 viral messenger RNA (mRNA) from 14       high-risk HPV types (16,18,31,33,35,39,45,51,52,56,58,59,66,68).         The analytical performance characteristics of       this assay have been determined by [employer]. The modifications have not been       cleared or approved by the FDA. This assay has       been validated pursuant to the  CLIA regulations       and is used for clinical purposes.

## 2022-02-15 NOTE — TELEPHONE ENCOUNTER
Entered by Katelyn Arroyo MA on February 04, 2021 4:16:20 PM CST  ---------------------  From: Katelyn Arroyo MA   To: [facility] #[id number]    Sent: 2/4/2021 4:16:20 PM CST  Subject: Medication Management     ** Submitted: **  Order:albuterol (Albuterol (Eqv-Proventil HFA) 90 mcg/inh inhalation aerosol)  2 puff(s)  Inhale  qid  Qty:  3 EA        Refills:  2          Substitutions Allowed     Route To [facility] #[id number]    Signed by Katelyn Arroyo MA  2/4/2021 10:15:00 PM [facility]    ** Submitted: **  Complete:albuterol (Ventolin HFA 90 mcg/inh inhalation aerosol)   Signed by Katelyn Arroyo MA  2/4/2021 10:16:00 PM [facility]    ** Submitted: **  Complete:albuterol (Ventolin HFA 90 mcg/inh inhalation aerosol)   Signed by Katelyn Arroyo MA  2/4/2021 10:16:00 PM [facility]    ** Not Approved:  **  albuterol (ALBUTEROL HFA INH (200 PUFFS)6.7GM)  INHALE 2 PUFFS BY MOUTH FOUR TIMES DAILY AS NEEDED FOR SHORTNESS OF BREATH OR WHEEZING  Qty:  6.7 gm        Days Supply:  25        Refills:  0          Substitutions Allowed     Route To [facility] #[id number]   Signed by Katelyn Arroyo MA            ------------------------------------------  From: [facility] #[id number]  To: Missy Ford MD  Sent: February 4, 2021 3:43:57 AM CST  Subject: Medication Management  Due: January 30, 2021 4:21:54 PM CST     ** On Hold Pending Signature **     Dispensed Drug: albuterol (Albuterol (Eqv-Proventil HFA) 90 mcg/inh inhalation aerosol), INHALE 2 PUFFS BY MOUTH FOUR TIMES DAILY AS NEEDED FOR SHORTNESS OF BREATH OR WHEEZING  Quantity: 6.7 gm  Days Supply: 25  Refills: 0  Substitutions Allowed  Notes from Pharmacy:  ------------------------------------------Med Refill      Date of last office visit and reason:  12/17/2020 w/ FILI for px      Date of last Med Check / Px:   _  Date of last labs pertaining to med:  _    Note:  _    RTC order in chart:  RTC due Dec 2021    For Protocol refill, has patient  been contacted:  _

## 2022-02-15 NOTE — PROGRESS NOTES
Patient:   SOFIA BOB            MRN: [id number]            FIN: [id number]               Age:   21 years     Sex:  Female     :  1995   Associated Diagnoses:   None   Author:   José Miguel Simms MD      Visit Information      Date of Service: 2017 02:06 pm  Performing Location: [facility]  Encounter#: [id number]      Primary Care Provider (PCP):  NONE ,       Referring Provider:  José Miguel Simms MD    NPI# [id number]      Chief Complaint   2017 2:11 PM CDT    Pt c/o sore throat for 2 days. Also c/o right ear pain.        History of Present Illness   CC as above and reviewed w  patient   Felt feverish this morning but this resolved.   Hurts to swallow.       Review of Systems         No cold cough symptoms      Health Status   Allergies:    Allergic Reactions (Selected)  Severity Not Documented  Pollen (No reactions were documented)   Medications:  (Selected)   Prescriptions  Prescribed  Mononessa 0.25 mg-35 mcg oral tablet: 1 tab(s), po, daily, # 90 tab(s), 3 Refill(s), Type: Maintenance, Pharmacy: [facility], Due for visit before any more refills, 1 tab(s) po daily  SUMAtriptan 25 mg oral tablet: See Instructions, Instructions: 2 tab(s) once  may repeat dose in 2 hours if needed, # 9 tab(s), 1 Refill(s), Type: Soft Stop, [facility]acy: [email or web address], 2 tab(s) once; may repeat dose in 2 hours if needed  Ventolin HFA 90 mcg/inh inhalation aerosol: 2 puff(s) ( 180 mcg ), INH, QID, PRN: for shortness of breath or wheezing, # 18 gm, 5 Refill(s), Type: Maintenance, [facility]: [email or web address], 2 puff(s) inh qid,PRN:for shortness of breath or wheezing  omeprazole 20 mg oral delayed release capsule: 1 cap(s) ( 20 mg ), PO, Daily, # 30 cap(s), 0 Refill(s), Type: Maintenance, Pharmacy: [id number], 1 cap(s) po daily,x30 day(s)  Documented Medications  Documented  propranolol: ( 60 mg ), 0 Refill(s), Type: Maintenance   Problem  list:    All Problems  Dysmenorrhea in adolescent / SNOMED CT [id number] / Confirmed      Histories   Past Medical History:    No active or resolved past medical history items have been selected or recorded.   Family History:    No family history items have been selected or recorded.   Procedure history:    No active procedure history items have been selected or recorded.   Social History:        Alcohol Assessment            Never      Tobacco Assessment            Never      Substance Abuse Assessment            Never      Employment and Education Assessment            Student      Home and Environment Assessment            Marital status: Single.      Nutrition and Health Assessment            Type of diet: Regular.      Exercise and Physical Activity Assessment            Exercise frequency: Never.      Sexual Assessment            Sexually active: No.        Physical Examination   Vital Signs   6/12/2017 2:11 PM CDT Temperature Tympanic 98.8 DegF    Peripheral Pulse Rate 112 bpm  HI    Systolic Blood Pressure 116 mmHg    Diastolic Blood Pressure 66 mmHg    Mean Arterial Pressure 83 mmHg    Oxygen Saturation 97 %      Measurements from flowsheet : Measurements   6/12/2017 2:11 PM CDT Height Measured - Standard 61.5 in    Weight Measured - Standard 115 lb    BSA 1.5 m2    Body Mass Index 21.37 kg/m2      Oropharynx - mmm. B/l tonsillar swelling w exudate  R tender adenopathy  TMs wnl bl  Lungs ctab      Review / Management   Results review:  Lab results: 6/12/2017 2:36 PM CDT    Rapid Strep A             Negative  .       Impression and Plan   Sore throat  strep neg, await culture  consieder mono testing if not following usual viral course

## 2022-02-15 NOTE — NURSING NOTE
Comprehensive Intake Entered On:  3/9/2020 6:43 PM CDT    Performed On:  3/9/2020 6:39 PM CDT by Darcie Jiang CMA               Summary   Chief Complaint :   c/o sinus pressure, jaw pain, headache, cough x 1.5 weeks   Menstrual Status :   Menarcheal   Weight Measured :   133.8 lb(Converted to: 133 lb 13 oz, 60.69 kg)    Height Measured :   62 in(Converted to: 5 ft 2 in, 157.48 cm)    Body Mass Index :   24.47 kg/m2   Body Surface Area :   1.63 m2   Systolic Blood Pressure :   120 mmHg   Diastolic Blood Pressure :   64 mmHg   Mean Arterial Pressure :   83 mmHg   Peripheral Pulse Rate :   74 bpm   Temperature Tympanic :   98.7 DegF(Converted to: 37.1 DegC)    Darcie Jiang CMA - 3/9/2020 6:39 PM CDT   Health Status   Allergies Verified? :   Yes   Medication History Verified? :   Yes   Pre-Visit Planning Status :   Completed   Darcie Jiang CMA - 3/9/2020 6:39 PM CDT   Consents   Consent for Immunization Exchange :   Consent Granted   Consent for Immunizations to Providers :   Consent Granted   Darcie Jiang CMA - 3/9/2020 6:39 PM CDT   Meds / Allergies   (As Of: 3/9/2020 6:43:13 PM CDT)   Allergies (Active)   Pollen  Estimated Onset Date:   Unspecified ; Created By:   Candis Galdamez; Reaction Status:   Active ; Category:   Drug ; Substance:   Pollen ; Type:   Allergy ; Updated By:   Candis Galdamez; Reviewed Date:   10/31/2019 10:37 AM CDT        Medication List   (As Of: 3/9/2020 6:43:13 PM CDT)   Prescription/Discharge Order    albuterol  :   albuterol ; Status:   Prescribed ; Ordered As Mnemonic:   Ventolin HFA 90 mcg/inh inhalation aerosol ; Simple Display Line:   180 mcg, 2 puff(s), INH, QID, PRN: for shortness of breath or wheezing, 18 gm, 5 Refill(s) ; Ordering Provider:   Dianne Esquivel; Catalog Code:   albuterol ; Order Dt/Tm:   11/28/2016 12:21:08 PM CST          ethinyl estradiol-norgestimate  :   ethinyl estradiol-norgestimate ; Status:   Prescribed ;  Ordered As Mnemonic:   Estarylla 0.25 mg-35 mcg oral tablet ; Simple Display Line:   1 tab(s), Oral, daily, 84 tab(s), 3 Refill(s) ; Ordering Provider:   Missy Ford MD; Catalog Code:   ethinyl estradiol-norgestimate ; Order Dt/Tm:   12/27/2019 3:45:16 PM CST          Miscellaneous Rx Supply  :   Miscellaneous Rx Supply ; Status:   Prescribed ; Ordered As Mnemonic:   Walker ; Simple Display Line:   See Instructions, use as directed, 1 EA, 0 Refill(s) ; Ordering Provider:   Missy Ford MD; Catalog Code:   Miscellaneous Rx Supply ; Order Dt/Tm:   6/24/2019 7:20:04 PM CDT          Miscellaneous Rx Supply  :   Miscellaneous Rx Supply ; Status:   Prescribed ; Ordered As Mnemonic:   Wheelchair ; Simple Display Line:   See Instructions, use as directed, 1 EA, 0 Refill(s) ; Ordering Provider:   Missy Ford MD; Catalog Code:   Miscellaneous Rx Supply ; Order Dt/Tm:   6/24/2019 7:20:04 PM CDT          SUMAtriptan  :   SUMAtriptan ; Status:   Prescribed ; Ordered As Mnemonic:   SUMAtriptan 25 mg oral tablet ; Simple Display Line:   See Instructions, 2 tab(s) once  may repeat dose in 2 hours if needed, 9 tab(s), 1 Refill(s) ; Ordering Provider:   Christie De Dios; Catalog Code:   SUMAtriptan ; Order Dt/Tm:   1/4/2018 10:23:56 AM [facility]            Home Meds    dimethyl fumarate  :   dimethyl fumarate ; Status:   Documented ; Ordered As Mnemonic:   Tecfidera 240 mg oral delayed release capsule ; Simple Display Line:   240 mg, 1 cap(s), Oral, bid, 0 Refill(s) ; Catalog Code:   dimethyl fumarate ; Order Dt/Tm:   12/6/2019 2:15:57 PM CST

## 2022-02-15 NOTE — PROGRESS NOTES
Chief Complaint    Physical  History of Present Illness      Ptient present to clinic today for annual wellness exam and to refill OCP.  She has been using OCP for contraception.  She denies possibility of pregnancy and declines UPT today.  Is aware of various alternatives for contraception including nexplanon, OCP, nuvaring, patch, Depo Provera, IUD and IUS, NFP, diaphragm, condoms, abstinence and sterilization. She is aware of risks associated with estrogen including stroke, DVT, PE, CVA, MI and would like to proceed with OCP refill.    Also counseled patient that all patients of reproductive age should be taking 400 mcg folic acid daily to reduce risks of 2 birth defects.      She reports her MS has been well controlled, it took about 6 months for her to recover from her initial flare, her meds are working great and      she continues to follow up with her neurologist.      She is on an immune modulating med, had been seen by Gyn Onc and their note indicates pt was to get sonohystogram and if nml rtc for d and c, she reports the sonohystogram was normal, and was told she did not need the d and c.  we discussed whether to do a pap this year, and we've used joint decision making to do pap next year, which would be at 2 years instead of 3.      she has mild intermittent asthma that is well controlled.  has not needed her inhaler in years.       Review of systems is negative except as per HPI including:  no fevers, chills, sore throat, runny nose, nausea, vomiting, constipation, diarrhea, rash or new skin lesions, chest pain, palpitations, slurred speech, new paresthesia, shortness of breath or wheeze. she also denies and genital lesions or sores or discharge or itching, no pelvic pain.      Exam:      General: alert and oriented ×3 no acute distress.      HEENT: pupils are equal round and reactive to light extraocular motion is intact. Normocephalic and atraumatic.       Hearing is grossly normal and there is no  otorrhea.       Nares are patent there is no rhinorrhea.       Mucous membranes are moist and pink.      Chest: has bilateral rise with no increased work of breathing.      Cardiovascular: normal perfusion and brisk capillary refill.      Musculoskeletal: no gross focal abnormalities and normal gait.      Neuro: no gross focal abnormalities and memory seems intact.      Psychiatric: speech is clear and coherent and fluent. Patient dressed appropriately for the weather. Mood is appropriate and affect is full.                     Discussed with patient to return to clinic if symptoms worsen or do not improve, use condoms for back up for the first month to reduce risk of pregnancy and always use condoms to reduce risk of STD transmission.  Use sunscreen to reduce risk of skin cancer, refer to my healthy plate for information regarding diet and American Heart association web site for exercise recommendations.       using sunscreen, protecting from sunburn, monthly self skin checks      taking folic acid 400 mcg daily      refer to usdachoosemyplate.gov, AHA and ADA for diet and exercise recommendations      consume [id number] mg calcium daily      std screening declined      regular self skin checks      RTC in the Fall for flu shot         Physical Exam   Vitals & Measurements    T: 99  F (Tympanic)  HR: 95 (Peripheral)  BP: 122/68  SpO2: 97%     HT: 62 in   Assessment/Plan       Asthma (J45.20)         renewed albuterolARx and provided with ASTHMA ACTION plan.  counseled on usse of valved holding h=chamber spacer         Ordered:          Miscellaneous Prescription, valved holding chamber spacer, See Instructions, Instructions: use with albuterol, Supply, # 1 EA, 0 Refill(s), Type: Maintenance, called to pharmacy (Rx), (Ordered)          predniSONE, = 1 tab(s) ( 50 mg ), PO, Daily, # 5 tab(s), 0 Refill(s), Type: Maintenance, Pharmacy: [facility] #[id number], 1 tab(s) Oral daily,x5 day(s), 62, in, 03/09/20 18:39:00  CDT, Height Measured, 133.8, lb, 03/09/20 18:39:00 CDT, Weight Measured, (Ordered)          76176 office outpatient visit 25 minutes (Charge), Quantity: 1, Asthma  Multiple sclerosis  Long-term use of immunosuppressant medication                Encounter for immunization (Z23)         flu shot today         Ordered:          influenza virus vaccine, inactivated, 0.5 mL, IM, once, (Completed)          90497 imadm prq id subq/im njxs 1 vaccine (Charge), Quantity: 1, Encounter for immunization          75055 iiv4 vacc no prsv 0.5 ml im (Charge), Quantity: 1, Encounter for immunization                Long-term use of immunosuppressant medication (Z79.899)         Ordered:          40117 office outpatient visit 25 minutes (Charge), Quantity: 1, Asthma  Multiple sclerosis  Long-term use of immunosuppressant medication                Multiple sclerosis (G35)         cont to follow with neuro, taken into consideration for joint decision making         Ordered:          95048 office outpatient visit 25 minutes (Charge), Quantity: 1, Asthma  Multiple sclerosis  Long-term use of immunosuppressant medication                Well adult exam (Z00.00)         Ordered:          45967 periodic preventive med est patient 18-39 yrs (Charge), Quantity: 1, Well adult exam                Orders:         albuterol, 2 puff(s) ( 180 mcg ), INH, QID, PRN: for shortness of breath or wheezing, # 1 EA, 1 Refill(s), Type: Maintenance, Pharmacy: [facility] #[id number], 2 puff(s) Inhale qid,PRN:for shortness of breath or wheezing, 62, in, 03/09/20 18:39:00 CDT, [place]..., (Ordered)         ethinyl estradiol-norgestimate, 1 tab(s), Oral, daily, # 28 tab(s), 0 Refill(s), Type: Hard Stop, Pharmacy: [facility] #[id number], Office visit needed for more refills, 62, in, 12/01/20 17:45:00 CST, Height Measured, 132, lb, 12/01/20 17:45:00 CST, Weight Measured, (Completed)         ethinyl estradiol-norgestimate, 1 tab(s), Oral, daily,  Instructions: take 21 days of active tablets then skip placebos and start the next pack, # 112 tab(s), 3 Refill(s), Type: Maintenance, Pharmacy: [facility] #[id number], Office visit needed for more refills, 1 tab(s) Oral da..., (Ordered)         Return to Clinic (Request), RFV: Annual physical, med check, Return in 1 week         Return to Clinic (Request), RFV: Annual physical, Return in 1 year, RTC Date 12/17/21         Return to Clinic (Request), RFV: needs cervical cancer screening, Return in 11 months  Patient Information     Name:SOFIA BOB      Address:      [address] [place] [address] [address], [place] [id number]     Sex:Female     YOB: 1995     [phone]     Emergency Contact:ABIGAIL BOB     MRN:[id number]     FIN:[id number]     Location:[facility]     Date of Service:12/17/2020      Primary Care Physician:       Missy Ford MD, [phone]      Attending Physician:       Missy Ford MD, [phone]  Problem List/Past Medical History    Ongoing     Asthma     Cervical polyp     Dysmenorrhea     Dysmenorrhea in adolescent     Headache     Long-term use of immunosuppressant medication     Multiple sclerosis    Historical     Concussion with no loss of consciousness  Procedure/Surgical History     Tympanoplasty (2008)           Tympanostomy (1999)        Medications    Estarylla 0.25 mg-35 mcg oral tablet    Misc Prescription    predniSONE 50 mg oral tablet, 50 mg= 1 tab(s), Oral, daily    Sprintec 0.25 mg-35 mcg oral tablet, 1 tab(s), Oral, daily, 3 refills    Tecfidera 240 mg oral delayed release capsule, 240 mg= 1 cap(s), Oral, bid    valved holding chamber spacer, See Instructions    Ventolin HFA 90 mcg/inh inhalation aerosol, 180 mcg= 2 puff(s), Inhale, qid, PRN, 1 refills    Ventolin HFA 90 mcg/inh inhalation aerosol, 180 mcg= 2 puff(s), Inhale, qid, PRN, 5 refills    Walker, See Instructions    Wheelchair, See Instructions  Allergies     Pollen  Social History    Smoking Status     Never smoker     Alcohol      Current, Liquor (Hard) (1.5 oz), 1-2 times per month, 1 drinks/episode average. 3 drinks/episode maximum. Drinks more than intended: No.     Electronic Cigarette/Vaping      Electronic Cigarette Use: Never.     Employment/School      Student, Work/School description: [employer], studying elementary education. Plans to graduate in Dec 2018. Plans grad school..     Exercise      Exercise frequency: Never.     Home/Environment      Marital status: Single. Family/Friends available for support: Yes. Risks in environment: owns secured gun.     Nutrition/Health      Type of diet: Regular.     Sexual      Sexually active: Yes. Identifies as female, Sexual orientation: Straight or heterosexual. Uses condoms: Yes. Contraceptive Use Details: Birth control pill.     Substance Abuse      Never     Tobacco      Never (less than 100 in lifetime)  Family History    CA - Breast cancer: Grandmother (P).    Mother: History is negative    Father: History is negative    Sister: History is negative  Immunizations      Vaccine Date Status          influenza virus vaccine, inactivated 12/17/2020 Given          influenza virus vaccine, inactivated 10/31/2019 Given          tetanus/diphth/pertuss (Tdap) adult/adol 05/03/2018 Given          influenza virus vaccine, inactivated 11/28/2016 Given          human papillomavirus vaccine 03/18/2009 Recorded          influenza virus vaccine, inactivated 10/27/2008 Recorded          human papillomavirus vaccine 08/28/2008 Recorded          tetanus/diphth/pertuss (Tdap) adult/adol 06/24/2008 Recorded          meningococcal conjugate vaccine 06/24/2008 Recorded          human papillomavirus vaccine 06/24/2008 Recorded          OPV 07/27/2001 Recorded          varicella 07/27/2001 Recorded          MMR (measles/mumps/rubella) 07/27/2001 Recorded          DTP (obsolete) 07/27/2001 Recorded          DTaP 07/27/2001 Recorded          MMR  (measles/mumps/rubella) 07/18/1997 Recorded          MMR (measles/mumps/rubella) 07/18/1997 Recorded          OPV 07/18/1997 Recorded          Hib (PRP-T) 07/18/1997 Recorded          DTP (obsolete) 07/18/1997 Recorded          hepatitis B pediatric vaccine 07/18/1997 Recorded          DTaP 07/18/1997 Recorded          DTaP 01/24/1997 Recorded          DTP-Hib 01/24/1997 Recorded          DTaP-Hib 01/24/1997 Recorded          OPV 1996 Recorded          DTP-Hib 1996 Recorded          DTaP-Hib 1996 Recorded          Hib 1996 Recorded          DTaP 1996 Recorded          OPV 1996 Recorded          hepatitis B pediatric vaccine 1996 Recorded          DTP-Hib 1996 Recorded          DTaP-Hib 1996 Recorded          hepatitis B pediatric vaccine 1996 Recorded

## 2022-02-15 NOTE — NURSING NOTE
Comprehensive Intake Entered On:  10/31/2019 10:37 AM CDT    Performed On:  10/31/2019 10:36 AM CDT by Edgardo Romero               Summary   Chief Complaint :   Pt here today for med refill.    Menstrual Status :   Menarcheal   Weight Measured :   126.4 lb(Converted to: 126 lb 6 oz, 57.33 kg)    Height Measured :   62 in(Converted to: 5 ft 2 in, 157.48 cm)    Body Mass Index :   23.12 kg/m2   Body Surface Area :   1.58 m2   Systolic Blood Pressure :   117 mmHg   Diastolic Blood Pressure :   72 mmHg   Mean Arterial Pressure :   87 mmHg   Peripheral Pulse Rate :   68 bpm   BP Site :   Right arm   BP Method :   Manual   HR Method :   Electronic   Edgardo Romero - 10/31/2019 10:36 AM CDT   Health Status   Allergies Verified? :   Yes   Medication History Verified? :   Yes   Medical History Verified? :   Yes   Pre-Visit Planning Status :   Completed   Edgardo Romero - 10/31/2019 10:36 AM CDT   Demographics   Last Name :   [facility]   Address :   [address] [place] [address]   First Name :   SOFIA Hodgson Initial :   DK   Responsible Party Date of Birth () :   1995 CDT   City :   [place]   State :   [place]   Zip Code :   [address]   Edgardo Romero - 10/31/2019 10:36 AM CDT   Ancillary Services Grid   Name :    [facility]   [employer]           Type of Service :       Other: Opthalmologist           [place] :       [place]             Edgardo Romero - 10/31/2019 10:36 AM CDT  Edgardo Romero - 10/31/2019 10:36 AM CDT        Consents   Consent for Immunization Exchange :   Consent Granted   Consent for Immunizations to Providers :   Consent Granted   Edgardo Romero - 10/31/2019 10:36 AM CDT   Meds / Allergies   (As Of: 10/31/2019 10:37:58 AM CDT)   Allergies (Active)   Pollen  Estimated Onset Date:   Unspecified ; Created By:   Candis Galdamez; Reaction Status:   Active ; Category:   Drug ; Substance:   Pollen ; Type:   Allergy ; Updated By:    Candis Galdamez; Reviewed Date:   10/31/2019 10:37 AM CDT        Medication List   (As Of: 10/31/2019 10:37:58 AM CDT)   Prescription/Discharge Order    ethinyl estradiol-norgestimate  :   ethinyl estradiol-norgestimate ; Status:   Prescribed ; Ordered As Mnemonic:   Estarylla 0.25 mg-35 mcg oral tablet ; Simple Display Line:   1 tab(s), Oral, daily, 28 tab(s), 0 Refill(s) ; Ordering Provider:   Missy Ford MD; Catalog Code:   ethinyl estradiol-norgestimate ; Order Dt/Tm:   9/5/2019 8:00:34 AM CDT          Miscellaneous Rx Supply  :   Miscellaneous Rx Supply ; Status:   Prescribed ; Ordered As Mnemonic:   Rollator ; Simple Display Line:   See Instructions, use as directed, 1 EA, 0 Refill(s) ; Ordering Provider:   Missy Ford MD; Catalog Code:   Miscellaneous Rx Supply ; Order Dt/Tm:   6/24/2019 7:20:04 PM CDT          Miscellaneous Rx Supply  :   Miscellaneous Rx Supply ; Status:   Prescribed ; Ordered As Mnemonic:   Walker ; Simple Display Line:   See Instructions, use as directed, 1 EA, 0 Refill(s) ; Ordering Provider:   Missy Ford MD; Catalog Code:   Miscellaneous Rx Supply ; Order Dt/Tm:   6/24/2019 7:20:04 PM CDT          Miscellaneous Rx Supply  :   Miscellaneous Rx Supply ; Status:   Prescribed ; Ordered As Mnemonic:   Wheelchair ; Simple Display Line:   See Instructions, use as directed, 1 EA, 0 Refill(s) ; Ordering Provider:   Missy Ford MD; Catalog Code:   Miscellaneous Rx Supply ; Order Dt/Tm:   6/24/2019 7:20:04 PM CDT          SUMAtriptan  :   SUMAtriptan ; Status:   Prescribed ; Ordered As Mnemonic:   SUMAtriptan 25 mg oral tablet ; Simple Display Line:   See Instructions, 2 tab(s) once  may repeat dose in 2 hours if needed, 9 tab(s), 1 Refill(s) ; Ordering Provider:   Christie De Dios; Catalog Code:   SUMAtriptan ; Order Dt/Tm:   1/4/2018 10:23:56 AM CST          albuterol  :   albuterol ; Status:   Prescribed ; Ordered As Mnemonic:   Ventolin HFA 90 mcg/inh  inhalation aerosol ; Simple Display Line:   180 mcg, 2 puff(s), INH, QID, PRN: for shortness of breath or wheezing, 18 gm, 5 Refill(s) ; Ordering Provider:   Dianne Esquivel; Catalog Code:   albuterol ; Order Dt/Tm:   11/28/2016 12:21:08 PM [facility]            Home Meds    propranolol  :   propranolol ; Status:   Documented ; Ordered As Mnemonic:   propranolol ; Simple Display Line:   60 mg ; Catalog Code:   propranolol ; Order Dt/Tm:   6/30/2015 11:23:41 AM CDT

## 2022-02-15 NOTE — NURSING NOTE
Comprehensive Intake Entered On:  2020 5:34 PM CST    Performed On:  2020 5:27 PM CST by Elizabeth Del Cid               Summary   Chief Complaint :   Physical   Menstrual Status :   Menarcheal   Height/Length Estimated :   62 in(Converted to: 5 ft 2 in, 157.48 cm)    Systolic Blood Pressure :   122 mmHg   Diastolic Blood Pressure :   68 mmHg   Mean Arterial Pressure :   86 mmHg   Peripheral Pulse Rate :   95 bpm   BP Site :   Right arm   Pulse Site :   Radial artery   BP Method :   Manual   HR Method :   Electronic   Temperature Tympanic :   99 DegF(Converted to: 37.2 DegC)    Oxygen Saturation :   97 %   Viridiana Del Cidbeth - 2020 5:27 PM CST   Health Status   Allergies Verified? :   Yes   Medication History Verified? :   Yes   Medical History Verified? :   Yes   Pre-Visit Planning Status :   Completed   Tobacco Use? :   Never smoker   Viridiana Del Cidbeth - 2020 5:27 PM CST   Demographics   Last Name :   PAULINO   Address :   [address] [place] [address]   First Name :   SOFIA Hodgson Initial :   DK   Responsible Party Date of Birth () :   1995 CDT   City :   [place]   State :   [place]   Zip Code :   [address]   Contact Relationship to Patient (other) :   Patient   Elizabeth Del Cid - 2020 5:27 PM CST   Ancillary Services Grid   Name :    [facility]           Type of Service :       Other: Opthalmologist           Location :       Trena [facility]urnz[facility] - 2020 5:27 PM CST  Viridiana Del Cidbeth - 2020 5:27 PM CST        Consents   Consent for Immunization Exchange :   Consent Granted   Consent for Immunizations to Providers :   Consent Granted   Elziabeth Del Cid - 2020 5:27 PM CST   Meds / Allergies   (As Of: 2020 5:34:40 PM CST)   Allergies (Active)   Pollen  Estimated Onset Date:   Unspecified ; Created By:   Candis Lr MA; Reaction Status:   Active ; Category:   Drug ; Substance:   Pollen ; Type:   Allergy ; Updated By:   Be BASSETT  Candis; Reviewed Date:   12/17/2020 5:31 PM CST        Medication List   (As Of: 12/17/2020 5:34:40 PM CST)   Prescription/Discharge Order    albuterol  :   albuterol ; Status:   Prescribed ; Ordered As Mnemonic:   Ventolin HFA 90 mcg/inh inhalation aerosol ; Simple Display Line:   180 mcg, 2 puff(s), INH, QID, PRN: for shortness of breath or wheezing, 18 gm, 5 Refill(s) ; Ordering Provider:   Dianne Esquivel; Catalog Code:   albuterol ; Order Dt/Tm:   11/28/2016 12:21:08 PM CST          ethinyl estradiol-norgestimate  :   ethinyl estradiol-norgestimate ; Status:   Prescribed ; Ordered As Mnemonic:   Sprintec 0.25 mg-35 mcg oral tablet ; Simple Display Line:   1 tab(s), Oral, daily, 28 tab(s), 0 Refill(s) ; Ordering Provider:   Missy Ford MD; Catalog Code:   ethinyl estradiol-norgestimate ; Order Dt/Tm:   12/1/2020 6:17:21 PM CST          Miscellaneous Rx Supply  :   Miscellaneous Rx Supply ; Status:   Prescribed ; Ordered As Mnemonic:   Walker ; Simple Display Line:   See Instructions, use as directed, 1 EA, 0 Refill(s) ; Ordering Provider:   Missy Ford MD; Catalog Code:   Miscellaneous Rx Supply ; Order Dt/Tm:   6/24/2019 7:20:04 PM CDT          Miscellaneous Rx Supply  :   Miscellaneous Rx Supply ; Status:   Prescribed ; Ordered As Mnemonic:   Wheelchair ; Simple Display Line:   See Instructions, use as directed, 1 EA, 0 Refill(s) ; Ordering Provider:   Missy Ford MD; Catalog Code:   Miscellaneous Rx Supply ; Order Dt/Tm:   6/24/2019 7:20:04 PM CDT            Home Meds    dimethyl fumarate  :   dimethyl fumarate ; Status:   Documented ; Ordered As Mnemonic:   Tecfidera 240 mg oral delayed release capsule ; Simple Display Line:   240 mg, 1 cap(s), Oral, bid, 0 Refill(s) ; Catalog Code:   dimethyl fumarate ; Order Dt/Tm:   12/6/2019 2:15:57 PM CST            ID Risk Screen   Recent Travel History :   No recent travel   Family Member Travel History :   No recent travel   Other Exposure  to Infectious Disease :   Unknown   Elizabeth Del Cid - 12/17/2020 5:27 PM CST   Social History   Social History   (As Of: 12/17/2020 5:34:40 PM CST)   Alcohol:        Current, Liquor (Hard) (1.5 oz), 1-2 times per month, 1 drinks/episode average.  3 drinks/episode maximum.  Drinks more than intended: No.   (Last Updated: 12/1/2020 6:33:58 PM CST by Blanca Damian CMA)          Tobacco:        Never (less than 100 in lifetime)   (Last Updated: 12/1/2020 5:46:50 PM CST by Elizabeth Del Cid)          Electronic Cigarette/Vaping:        Electronic Cigarette Use: Never.   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 Plans to graduate in Dec 2018.  Plans grad school..   (Last Updated: 1/4/2018 10:50:52 AM CST by Christie De Dios)          Home/Environment:        Marital status: Single.  Family/Friends available for support: Yes.  Risks in environment: owns secured gun.   (Last Updated: 1/4/2018 3:38:04 PM CST by Lisas Mitchell)          Nutrition/Health:        Type of diet: Regular.   (Last Updated: 4/3/2015 9:46:48 AM CDT by Sandra Regalado MA)          Exercise:        Exercise frequency: Never.   (Last Updated: 4/3/2015 9:46:48 AM CDT by Sandra Regalado MA)          Sexual:        Sexually active: Yes.  Identifies as female, Sexual orientation: Straight or heterosexual.  Uses condoms: Yes.  Contraceptive Use Details: Birth control pill.   (Last Updated: 1/4/2018 10:53:53 AM CST by Christie De Dios)

## 2022-02-15 NOTE — NURSING NOTE
Depression Screening Entered On:  12/28/2021 9:26 AM CST    Performed On:  12/28/2021 9:26 AM CST by Lu Meade               Depression Screening   Little Interest - Pleasure in Activities :   Not at all   Feeling Down, Depressed, Hopeless :   Not at all   Initial Depression Screen Score :   0 Score   Poor Appetite or Overeating :   Not at all   Trouble Falling or Staying Asleep :   Not at all   Feeling Tired or Little Energy :   Several days   Feeling Bad About Yourself :   Not at all   Trouble Concentrating :   Not at all   Moving or Speaking Slowly :   Not at all   Thoughts Better Off Dead or Hurting Self :   Not at all   Difficulty at Work, Home, Getting Along :   Not difficult at all   Detailed Depression Screen Score :   1    Total Depression Screen Score :   1    Lu Meade - 12/28/2021 9:26 AM CST

## 2022-02-15 NOTE — TELEPHONE ENCOUNTER
---------------------  From: Missy Ford MD   To: Referral Coordinators Pool ([id number]_[facility]);     Sent: 12/13/2019 5:23:30 PM CST  Subject: General Message     please make sure cervical biopsy and paps get sent, thanks

## 2022-02-15 NOTE — TELEPHONE ENCOUNTER
---------------------  From: Carol Cagle CMA   To: Missy Ford MD;     Sent: 9/4/2019 1:37:05 PM CDT  Subject: med refill- BC     Fax received from [facility] to refill patients Estarylla. Please advise on when you would like patient to RTC.. you have not prescribed this medication for patient before. It looks like she is past due to f/u on an MRI.     Date of last office visit and reason:  6/24/19 migraines  Date of last labs pertaining to med:  n/a    RTC order in chart:  None placed    For Protocol refill, has patient been contacted:  _?   ?   ---------------------  From: Missy Ford  To: Carol Cagle CMA  Sent: September 4, 2019 4:44 PM CDT  Subject: RE: med refill- BC  ???  It is okay to refill?a one-month supply.? Would love to have patient come in and talk about risks benefits and alternatives of?estrogen containing birth control pill?if she is able to.1mo sent.Spoke to patient at 0946. She verbalized understanding and was transferred to scheduling.

## 2022-02-15 NOTE — TELEPHONE ENCOUNTER
---------------------  From: Maricruz Soliz (Appointment Pool ([id number]))   To: Pauline Heath;     Sent: 12/2/2020 9:47:08 AM CST  Subject: FW: 12/1 MJP appt           ---------------------  From: Blanca Damian CMA   To: Appointment Pool ([id number]);     Sent: 12/1/2020 6:29:22 PM CST  Subject: 12/1 MJP appt     Please cancel appt today since pt had to reschedule d/t time. Thanks!---------------------  From: Pauline Heath   To: Appointment Pool ([id number]_[place]);     Sent: 12/3/2020 9:48:57 AM CST  Subject: RE: 12/1 MJP appt     Unable to cancel past appointment-added note that patient wasn't seen.  Thanks!

## 2022-02-15 NOTE — TELEPHONE ENCOUNTER
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  will place consult.

## 2022-02-15 NOTE — NURSING NOTE
Comprehensive Intake Entered On:  12/28/2021 8:09 AM CST    Performed On:  12/28/2021 8:02 AM CST by Lu Meade               Summary   Chief Complaint :   Physical   Last Menstrual Period :   11/30/2021 CST   Menstrual Status :   Menarcheal   Weight Measured :   124.6 lb(Converted to: 124 lb 10 oz, 56.518 kg)    Height Measured :   62.5 in(Converted to: 5 ft 2 in, 158.75 cm)    Body Mass Index :   22.42 kg/m2   Body Surface Area :   1.58 m2   Systolic Blood Pressure :   90 mmHg   Diastolic Blood Pressure :   60 mmHg   Mean Arterial Pressure :   70 mmHg   Peripheral Pulse Rate :   93 bpm   BP Site :   Right arm   BP Method :   Manual   Respiratory Rate :   16 br/min   Oxygen Saturation :   98 %   Lu Meade - 12/28/2021 8:02 AM CST   Health Status   Allergies Verified? :   Yes   Medication History Verified? :   Yes   Pre-Visit Planning Status :   Completed   Lu Meade - 12/28/2021 8:02 AM CST   Consents   Consent for Immunization Exchange :   Consent Granted   Consent for Immunizations to Providers :   Consent Granted   Lu Meade - 12/28/2021 8:02 AM CST   Meds / Allergies   (As Of: 12/28/2021 8:09:04 AM CST)   Allergies (Active)   Pollen  Estimated Onset Date:   Unspecified ; Created By:   Candis Galdamez; Reaction Status:   Active ; Category:   Drug ; Substance:   Pollen ; Type:   Allergy ; Updated By:   Candis Galdamez; Reviewed Date:   5/3/2021 10:22 AM CDT        Medication List   (As Of: 12/28/2021 8:09:04 AM CST)   Prescription/Discharge Order    albuterol  :   albuterol ; Status:   Prescribed ; Ordered As Mnemonic:   Albuterol (Eqv-Proventil HFA) 90 mcg/inh inhalation aerosol ; Simple Display Line:   2 puff(s), Inhale, qid, 3 EA, 2 Refill(s) ; Ordering Provider:   Missy Ford MD; Catalog Code:   albuterol ; Order Dt/Tm:   2/4/2021 4:15:53 PM CST          ethinyl estradiol-norgestimate  :   ethinyl estradiol-norgestimate ; Status:   Prescribed ; Ordered As Mnemonic:    Estarylla 0.25 mg-35 mcg oral tablet ; Simple Display Line:   See Instructions, TAKE 21 DAYS OF ACTIVE TABLETS, SKIP PLACEBO TABLETS AND START NEXT PACK AS DIRECTED., 28 tab(s), 0 Refill(s) ; Ordering Provider:   Missy Ford MD; Catalog Code:   ethinyl estradiol-norgestimate ; Order Dt/Tm:   12/21/2021 12:27:35 PM CST          Miscellaneous Prescription  :   Miscellaneous Prescription ; Status:   Prescribed ; Ordered As Mnemonic:   valved holding chamber spacer ; Simple Display Line:   See Instructions, use with albuterol, 1 EA, 0 Refill(s) ; Ordering Provider:   Missy Ford MD; Catalog Code:   Miscellaneous Prescription ; Order Dt/Tm:   12/17/2020 5:53:31 PM CST          Miscellaneous Rx Supply  :   Miscellaneous Rx Supply ; Status:   Prescribed ; Ordered As Mnemonic:   Walker ; Simple Display Line:   See Instructions, use as directed, 1 EA, 0 Refill(s) ; Ordering Provider:   Missy Ford MD; Catalog Code:   Miscellaneous Rx Supply ; Order Dt/Tm:   6/24/2019 7:20:04 PM CDT          Miscellaneous Rx Supply  :   Miscellaneous Rx Supply ; Status:   Prescribed ; Ordered As Mnemonic:   Wheelchair ; Simple Display Line:   See Instructions, use as directed, 1 EA, 0 Refill(s) ; Ordering Provider:   Missy Ford MD; Catalog Code:   Miscellaneous Rx Supply ; Order Dt/Tm:   6/24/2019 7:20:04 PM CDT            Home Meds    diroximel fumarate  :   diroximel fumarate ; Status:   Documented ; Ordered As Mnemonic:   Vumerity ; Simple Display Line:   462 mg, Oral, bid, 0 Refill(s) ; Catalog Code:   diroximel fumarate ; Order Dt/Tm:   5/3/2021 10:23:53 AM CDT            Social History   Social History   (As Of: 12/28/2021 8:09:04 AM CST)   Alcohol:        Current, Liquor (Hard) (1.5 oz), 1-2 times per month, 1 drinks/episode average.  3 drinks/episode maximum.  Drinks more than intended: No.   (Last Updated: 12/1/2020 6:33:58 PM CST by Blanca Damian CMA)          Tobacco:        Never (less than 100 in  lifetime)   (Last Updated: 12/28/2021 8:04:56 AM CST by Lu Meade)          Electronic Cigarette/Vaping:        Electronic Cigarette Use: Never.   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 Plans to graduate in Dec 2018.  Plans grad school..   (Last Updated: 1/4/2018 10:50:52 AM CST by Christie De Dios)          Home/Environment:        Marital status: Single.  Family/Friends available for support: Yes.  Risks in environment: owns secured gun.   (Last Updated: 1/4/2018 3:38:04 PM CST by Lissa Mitchell)          Nutrition/Health:        Type of diet: Regular.   (Last Updated: 4/3/2015 9:46:48 AM CDT by Sandra Regalado MA)          Exercise:        Exercise frequency: Never.   (Last Updated: 4/3/2015 9:46:48 AM CDT by Sandra Regalado MA)          Sexual:        Sexually active: Yes.  Identifies as female, Sexual orientation: Straight or heterosexual.  Uses condoms: Yes.  Contraceptive Use Details: Birth control pill.   (Last Updated: 1/4/2018 10:53:53 AM CST by Christie De Dios)

## 2022-02-15 NOTE — TELEPHONE ENCOUNTER
Entered by Aditi Yuan on November 29, 2021 3:27:50 PM CST  ---------------------  From: Aditi Yuan   To: [facility] [employer] [facility] #[id number]    Sent: 11/29/2021 3:27:50 PM CST  Subject: Medication Management     ** Submitted: **  Order:ethinyl estradiol-norgestimate (Estarylla 0.25 mg-35 mcg oral tablet)  See Instructions  TAKE 21 DAYS OF ACTIVE PILLS, SKIP PLACEBOS AND START THE NEXT PACK AS DIRECTED  Qty:  28 tab(s)        Refills:  0          Substitutions Allowed     Route To [facility] [employer] [facility] #[id number]    Signed by Aditi Yuan  11/29/2021 9:25:00 PM [facility]    ** Submitted: **  Complete:ethinyl estradiol-norgestimate (Sprintec 0.25 mg-35 mcg oral tablet)   Signed by Aditi Yuan  11/29/2021 9:27:00 PM [facility]    ** Not Approved:  **  ethinyl estradiol-norgestimate (ESTARYLLA TABLETS 28S)  TAKE 21 DAYS OF ACTIVE PILLS, SKIP PLACEBOS AND START THE NEXT PACK AS DIRECTED  Qty:  112 tab(s)        Days Supply:  84        Refills:  0          Substitutions Allowed     Route To [facility] [employer] [facility] #[id number]   Signed by Aditi Yuan            ------------------------------------------  From: [facility] [employer] [facility] #[id number]  To: Missy Ford MD  Sent: November 21, 2021 5:12:41 PM CST  Subject: Medication Management  Due: November 16, 2021 3:34:48 PM CST     ** On Hold Pending Signature **     Dispensed Drug: ethinyl estradiol-norgestimate (Estarylla 0.25 mg-35 mcg oral tablet), TAKE 21 DAYS OF ACTIVE PILLS, SKIP PLACEBOS AND START THE NEXT PACK AS DIRECTED  Quantity: 112 tab(s)  Days Supply: 84  Refills: 0  Substitutions Allowed  Notes from Pharmacy:  ------------------------------------------due next month for annual physical. Sent one month supply and note to pharmacy that pt will be due next month for physical. Reminder letter will be sent soon.

## 2022-02-15 NOTE — PROGRESS NOTES
Chief Complaint    patient here today for possible colposcopy  History of Present Illness      patient present to clinic today for follow up a lesion seen on exam.  recent pap is ascus neg HRHPV.  I had seen a small mass on her cervix, but was unable to get it with ring forceps.  here for reexamination and biopsy if needed.  will do colopscopy to better visualize the area.      Review of systems is negative except as per HPI including:  no fevers, chills, sore throat, runny nose, nausea, vomiting, constipation, diarrhea, rash or new skin lesions, chest pain, palpitations, slurred speech, new paresthesia, shortness of breath or wheeze.      Exam:      General: alert and oriented ×3 no acute distress.      HEENT: pupils are equal round and reactive to light extraocular motion is intact. Normocephalic and atraumatic.       Hearing is grossly normal and there is no otorrhea.       Nares are patent there is no rhinorrhea.       Mucous membranes are moist and pink.      Chest: has bilateral rise with no increased work of breathing.      Cardiovascular: normal perfusion and brisk capillary refill.      Musculoskeletal: no gross focal abnormalities and normal gait.      Neuro: no gross focal abnormalities and memory seems intact.      Psychiatric: speech is clear and coherent and fluent. Patient dressed appropriately for the weather. Mood is appropriate and affect is full.       Procedure note colposcopy      informed consent obtained, benefit of diagnosing presence or absence of cervical dysplasia or cancer.      risks of pain, bleeding, infection, injury to surrounding tissue and need for further treatment      alternatives referral to GYN      finding adequate colposcopy, small nodule at the 7:00 position of the cervical os just inside the os.  using cervical speculum to open the os I was able to remove the lesion, approx 3 mm pink nodule,  and send it for pathology.       biopsy endocervical lesion      complications  neg      EBL <3 ml      hemostasis achieved with silver nitrate              Discussed with patient to return to clinic if symptoms worsen or do not improve  Physical Exam   Vitals & Measurements    T: 98.6   F (Tympanic)  HR: 104(Peripheral)  BP: 120/64  SpO2: 98%     HT: 62 in  WT: 127.2 lb  BMI: 23.26   Assessment/Plan       Atypical squamous cells of undetermined significance (ASCUS) on Papanicolaou smear of cervix (R87.610)       Cervical mass (N88.8)       Possible pregnancy (Z32.00)      s/p colposcopy today.  Patient Information     Name:SOFIA BOB      Address:      [address] [address] [place] [id number]     Sex:Female     YOB: 1995     [phone]     Emergency Contact:ABIGAIL BOB     MRN:[id number]     FIN:[id number]     Location:[facility]     Date of Service:12/06/2019      Primary Care Physician:       Missy Ford MD, [phone]      Attending Physician:       Missy Ford MD, [phone]  Problem List/Past Medical History    Ongoing     Asthma     Cervical polyp     Dysmenorrhea     Dysmenorrhea in adolescent     Headache     Long-term use of immunosuppressant medication     Multiple sclerosis    Historical     Concussion with no loss of consciousness  Medications    Estarylla 0.25 mg-35 mcg oral tablet, 1 tab(s), Oral, daily, 3 refills    propranolol, 60 mg    Rollator, See Instructions    SUMAtriptan 25 mg oral tablet, See Instructions, 1 refills    Tecfidera 240 mg oral delayed release capsule, 240 mg= 1 cap(s), Oral, bid    Ventolin HFA 90 mcg/inh inhalation aerosol, 180 mcg= 2 puff(s), Inhale, qid, PRN, 5 refills    Walker, See Instructions    Wheelchair, See Instructions  Allergies    Pollen  Social History    Smoking Status - 12/06/2019     Never smoker     Alcohol      Current, 1-2 times per month, 1 drinks/episode average. 2 drinks/episode maximum. Drinks more than intended: No., 05/07/2018     Employment/School      Student,  Work/School description: [employer], studying elementary education. Plans to graduate in Dec 2018. Plans grad school.., 01/04/2018     Exercise      Exercise frequency: Never., 04/03/2015     Home/Environment      Marital status: Single. Family/Friends available for support: Yes. Risks in environment: owns secured gun., 01/04/2018     Nutrition/Health      Type of diet: Regular., 04/03/2015     Sexual      Sexually active: Yes. Identifies as female, Sexual orientation: Straight or heterosexual. Uses condoms: Yes. Contraceptive Use Details: Birth control pill., 01/04/2018     Substance Abuse      Never, 04/03/2015     Tobacco      Never, 04/03/2015  Family History    Mother: History is negative    Father: History is negative    Sister: History is negative  Immunizations      Vaccine Date Status      influenza virus vaccine, inactivated 10/31/2019 Given      tetanus/diphth/pertuss (Tdap) adult/adol 05/03/2018 Given      influenza virus vaccine, inactivated 11/28/2016 Given      human papillomavirus vaccine 03/18/2009 Recorded      influenza virus vaccine, inactivated 10/27/2008 Recorded      human papillomavirus vaccine 08/28/2008 Recorded      tetanus/diphth/pertuss (Tdap) adult/adol 06/24/2008 Recorded      human papillomavirus vaccine 06/24/2008 Recorded      meningococcal conjugate vaccine 06/24/2008 Recorded      DTP (obsolete) 07/27/2001 Recorded      MMR (measles/mumps/rubella) 07/27/2001 Recorded      varicella 07/27/2001 Recorded      OPV 07/27/2001 Recorded      DTP (obsolete) 07/18/1997 Recorded      MMR (measles/mumps/rubella) 07/18/1997 Recorded      MMR (measles/mumps/rubella) 07/18/1997 Recorded      OPV 07/18/1997 Recorded      hepatitis B pediatric vaccine 07/18/1997 Recorded      Hib (PRP-T) 07/18/1997 Recorded      DTaP 01/24/1997 Recorded      DTP-Hib 01/24/1997 Recorded      OPV 1996 Recorded      DTP-Hib 1996 Recorded      Hib 1996 Recorded      OPV 1996 Recorded      DTaP  1996 Recorded      DTP-Hib 1996 Recorded      hepatitis B pediatric vaccine 1996 Recorded      hepatitis B pediatric vaccine 1996 Recorded  Lab Results       Lab Results (Last 4 results within 90 days)        U HCG POC: NEGATIVE [NEGATIVE  - NEGATIVE] (12/06/19 14:27:00)

## 2022-02-15 NOTE — PROGRESS NOTES
Patient:   SOFIA BOB            MRN: [id number]            FIN: [id number]               Age:   22 years     Sex:  Female     :  1995   Associated Diagnoses:   Exudative tonsillitis   Author:   Elaine Pretty      Visit Information      Date of Service: 2018 04:35 pm  Performing Location: [facility]  Encounter#: [id number]      Primary Care Provider (PCP):  NONE ,       Referring Provider:  Elaine Pretty    NPI# [id number]      Chief Complaint   2018 4:57 PM CDT    sore throat, fever chills, sinus pressure      History of Present Illness   reviewed presenting problem as above with patient  RF student, 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    Gastrointestinal:  No nausea, No vomiting, No diarrhea.              Health Status   Allergies:    Allergic Reactions (Selected)  Severity Not Documented  Pollen (No reactions were documented)   Medications:  (Selected)   Prescriptions  Prescribed  Augmentin 875 mg oral tablet: 1 tab(s), PO, q12hr, Instructions: with food or milk, # 20 tab(s), 0 Refill(s), Type: Maintenance, Pharmacy: [facility] #[id number], 1 tab(s) po q12 hrs,x10 day(s),Instr:with food or milk  Mononessa 0.25 mg-35 mcg oral tablet: 1 tab(s), po, daily, # 90 tab(s), 3 Refill(s), Type: Maintenance, Pharmacy: [facility], Please disregard prior rx for a 90 day supply.  Thank you, 1 tab(s) po daily  SUMAtriptan 25 mg oral tablet: See Instructions, Instructions: 2 tab(s) once  may repeat dose in 2 hours if needed, # 9 tab(s), 1 Refill(s), Type:  Soft Stop, Pharmacy: [id number], 2 tab(s) once; may repeat dose in 2 hours if needed  Ventolin HFA 90 mcg/inh inhalation aerosol: 2 puff(s) ( 180 mcg ), INH, QID, PRN: for shortness of breath or wheezing, # 18 gm, 5 Refill(s), Type: Maintenance, [facility]acy: [facility], 2 puff(s) inh qid,PRN:for shortness of breath or wheezing  predniSONE 10 mg oral tablet: 3 tab(s) ( 30 mg ), PO, Daily, # 21 tab(s), 0 Refill(s), Type: Maintenance, Pharmacy: [facility] #[id number], 3 tab(s) po daily,x7 day(s)  Documented Medications  Documented  propranolol: ( 60 mg ), 0 Refill(s), Type: Maintenance   Problem list:    All Problems  Dysmenorrhea in adolescent / SNOMED CT 262372957 / Confirmed  Dysmenorrhea / SNOMED CT 661769315 / Confirmed  Headache / SNOMED CT 73426618 / Confirmed  Asthma / SNOMED CT 659700443 / Confirmed  Resolved: Concussion with no loss of consciousness / SNOMED CT 790014003      Histories   Past Medical History:    Active  Dysmenorrhea (916325631)  Headache (71441204)  Asthma (809676888)  Resolved  Concussion with no loss of consciousness (269703179): Onset in 2015 at 20 years.  Resolved.   Family History:       Procedure history:    No active procedure history items have been selected or recorded.   Social History:        Alcohol Assessment            Current, 1-2 times per month, 1 drinks/episode average.  2 drinks/episode maximum.  Drinks more than               intended: No.      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 Plans to graduate in Dec 2018.  Plans               grad school..      Home and Environment Assessment            Marital status: Single.  Family/Friends available for support: Yes.  Risks in environment: owns secured gun.      Nutrition and Health Assessment            Type of diet: Regular.       Exercise and Physical Activity Assessment            Exercise frequency: Never.      Sexual Assessment            Sexually active: Yes.  Identifies as female, Sexual orientation: Straight or heterosexual.  Uses condoms:               Yes.  Contraceptive Use Details: Birth control pill.        Physical Examination   Vital Signs   4/30/2018 4:57 PM CDT Temperature Tympanic 103.7 DegF  HI    Peripheral Pulse Rate 84 bpm    Pulse Site Radial artery    HR Method Manual    Systolic Blood Pressure 122 mmHg    Diastolic Blood Pressure 64 mmHg    Mean Arterial Pressure 83 mmHg    BP Site Right arm    BP Method Manual      Measurements from flowsheet : Measurements   4/30/2018 4:57 PM CDT    Weight Measured - Standard                128 lb     General:  Alert and oriented, No acute distress.    Eye:  Normal conjunctiva.    HENT:  Tympanic membranes are clear, Normal hearing, Oral mucosa is moist, No sinus tenderness, injected post oropharynx with tonsil pillars 3/4 plus bilat with copious exudate and even some ulcerations  very tender ant chain only cervical lymph nodes enlarged , with palpation.    Neck:  Supple, Non-tender.    Respiratory:  Lungs are clear to auscultation, Respirations are non-labored, Breath sounds are equal, Symmetrical chest wall expansion.    Cardiovascular:  Normal rate, Regular rhythm, No murmur.    Musculoskeletal:  Normal range of motion, Normal gait.    Integumentary:  Warm, Dry, Pink, No rash.    Neurologic:  Alert, Oriented.    Psychiatric:  Cooperative.       Review / Management   Results review:  Lab results: 4/30/2018 5:41 PM CDT    Group A Strep POC         NOT DETECTED  .       Impression and Plan   Diagnosis     Exudative tonsillitis (ENM85-IY J03.90).     Plan:  counseled that this could be viral but will treat aggressively,  see me in 48 hours recheck, keep hydrated  fever/pain control.    Patient Instructions:       Counseled: Patient, Regarding diagnosis, Regarding treatment, Regarding  medications, Verbalized understanding, Counseled on symptomatic management. Return to clinic for re evaluation if worsening, simply not improving, or failure to resolve.   .    Orders     Orders (Selected)   Prescriptions  Prescribed  Augmentin 875 mg oral tablet: 1 tab(s), PO, q12hr, Instructions: with food or milk, # 20 tab(s), 0 Refill(s), Type: Maintenance, Pharmacy: [facility] #[id number], 1 tab(s) po q12 hrs,x10 day(s),Instr:with food or milk  predniSONE 10 mg oral tablet: 3 tab(s) ( 30 mg ), PO, Daily, # 21 tab(s), 0 Refill(s), Type: Maintenance, Pharmacy: [facility] #[id number], 3 tab(s) po daily,x7 day(s).

## 2022-02-15 NOTE — PROGRESS NOTES
Patient:   SOFIA BOB            MRN: [id number]            FIN: [id number]               Age:   24 years     Sex:  Female     :  1995   Associated Diagnoses:   Pre-op exam; Cervical polyp   Author:   Elaine Pretty      Preoperative Information   Here for preop history and physical      Chief Complaint   2020 5:39 PM CST    pre-op exam - exlporatory surgery of the uterus on 20 at [facility] with Dr Guerrero     Had an unusual cervical polyp  and will be having dilatation of the cervix to explore the uterus    Recent dx 2019 of Multiple Sclerosis      Review of Systems   Constitutional:  Negative.    Weight has been stable, no nutritional concerns  No Allergies to latex, iodine, contrast dye, shell fish or local anesthetics   Eye:  Negative.    Ear/Nose/Mouth/Throat:  Negative.    Respiratory:  Negative, Asthma is under good control, rare use of Albuterol. I asked her to bring inhaler to procedure.    No history of sleep apnea   Cardiovascular:  Negative.    Gastrointestinal:  Negative.    Genitourinary:  Negative.    Pregnancy ruled out-neg UPT today, on oral contraceptives   Hematology/Lymphatics:  Negative.    Endocrine:  Negative.    Musculoskeletal:  Negative.    Integumentary:  Negative.    Neurologic:  recently diagnosed MS.    Psychiatric:  Negative.    All other systems reviewed and negative   Has no history of anemia.  Has  no history of DVT or pulmonary embolism.  Has  no personal history of bleeding problems.   Has  no personal or family history of anesthesia reactions.  Patient does not have active tuberculosis.    S/he  has not taken aspirin or aspirin containing products in the last week.     S/he  has not taken Plavix (Clopidogrel) in the last 2 weeks.    S/he  has not taken warfarin in the past week.    S/he  has not been on corticosteroids for more than 2 weeks recently.      S/he  is not DNR before, during or after surgery.    Chest pain / SOB walking up 2 flights of  steps? No  Pain in neck or jaw? No  CAD MI?  NO  Afib? NO  Heart Failure? No  Asthma  or Bronchitis? NO  Diabetes? NO       Seizure Disorder? No  CKD? No  Thyroid Disease? No  Liver Disease No  CVA? No  ISAURA NO         Health Status   Allergies:    Allergic Reactions (Selected)  Severity Not Documented  Pollen (No reactions were documented)   Problem list:    All Problems  Asthma / SNOMED CT 130543875 / Confirmed  Cervical polyp / SNOMED CT 641583741 / Confirmed  Dysmenorrhea / SNOMED CT 169718214 / Confirmed  Dysmenorrhea in adolescent / SNOMED CT 306633985 / Confirmed  Headache / SNOMED CT 06133667 / Confirmed  Long-term use of immunosuppressant medication / SNOMED CT 7917077425 / Confirmed  Multiple sclerosis / SNOMED CT 93818732 / Confirmed  Resolved: Concussion with no loss of consciousness / SNOMED CT 241825717   Medications:  (Selected)   Prescriptions  Prescribed  Estarylla 0.25 mg-35 mcg oral tablet: 1 tab(s), Oral, daily, # 84 tab(s), 3 Refill(s), Type: Maintenance, Pharmacy: [facility], due for visit, 1 tab(s) Oral daily  SUMAtriptan 25 mg oral tablet: See Instructions, Instructions: 2 tab(s) once  may repeat dose in 2 hours if needed, # 9 tab(s), 1 Refill(s), Type: Soft Stop, Pharmacy: [id number], 2 tab(s) once; may repeat dose in 2 hours if needed  Ventolin HFA 90 mcg/inh inhalation aerosol: 2 puff(s) ( 180 mcg ), INH, QID, PRN: for shortness of breath or wheezing, # 18 gm, 5 Refill(s), Type: Maintenance, Pharmacy: [facility], 2 puff(s) inh qid,PRN:for shortness of breath or wheezing  Walker: Walker, See Instructions, Instructions: use as directed, Supply, # 1 EA, 0 Refill(s), Type: Maintenance, Pharmacy: [facility], Pt will rent, use as directed  Wheelchair: Wheelchair, See Instructions, Instructions: use as directed, Supply, # 1 EA, 0 Refill(s), Type: Maintenance, Pharmacy: [facility], Pt will rent, use as directed  Documented  Medications  Documented  Tecfidera 240 mg oral delayed release capsule: = 1 cap(s) ( 240 mg ), Oral, bid, 0 Refill(s), Type: Maintenance      Histories   Past Medical History:    Active  Dysmenorrhea (689600029)  Headache (17005129)  Asthma (182674176)  Resolved  Concussion with no loss of consciousness (314171719): Onset in 2015 at 20 years.  Resolved.   Family History:       Procedure history:    No active procedure history items have been selected or recorded.   Social History:        Alcohol Assessment            Current, 1-2 times per month, 1 drinks/episode average.  2 drinks/episode maximum.  Drinks more than               intended: No.      Tobacco Assessment            Never      Substance Abuse Assessment            Never      Employment and Education Assessment            Student, Work/School description: [employer], studying elementary education. Plans to graduate in Dec 2018.  Plans               grad school..      Home and Environment Assessment            Marital status: Single.  Family/Friends available for support: Yes.  Risks in environment: owns secured gun.      Nutrition and Health Assessment            Type of diet: Regular.      Exercise and Physical Activity Assessment            Exercise frequency: Never.      Sexual Assessment            Sexually active: Yes.  Identifies as female, Sexual orientation: Straight or heterosexual.  Uses condoms:               Yes.  Contraceptive Use Details: Birth control pill.  ,        Alcohol Assessment            Current, 1-2 times per month, 1 drinks/episode average.  2 drinks/episode maximum.  Drinks more than               intended: No.      Tobacco Assessment            Never      Substance Abuse Assessment            Never      Employment and Education Assessment            Student, Work/School description: [employer], studying elementary education. Plans to graduate in Dec 2018.  Plans               grad school..      Home and Environment Assessment             Marital status: Single.  Family/Friends available for support: Yes.  Risks in environment: owns secured gun.      Nutrition and Health Assessment            Type of diet: Regular.      Exercise and Physical Activity Assessment            Exercise frequency: Never.      Sexual Assessment            Sexually active: Yes.  Identifies as female, Sexual orientation: Straight or heterosexual.  Uses condoms:               Yes.  Contraceptive Use Details: Birth control pill.        Physical Examination   Vital Signs   1/27/2020 5:39 PM CST Temperature Tympanic 98.6 DegF    Peripheral Pulse Rate 99 bpm    Systolic Blood Pressure 116 mmHg    Diastolic Blood Pressure 70 mmHg    Mean Arterial Pressure 85 mmHg    BP Site Right arm    BP Method Manual    Oxygen Saturation 97 %      Measurements from flowsheet : Measurements   1/27/2020 5:39 PM CST Height Measured - Standard 62 in    Weight Measured - Standard 125.4 lb    BSA 1.58 m2    Body Mass Index 22.93 kg/m2      General:  Alert and oriented, No acute distress.    Eye:  Normal conjunctiva.    HENT:  Normocephalic, Tympanic membranes are clear, Oral mucosa is moist, No pharyngeal erythema, Mallampati Score 2.    Neck:  Supple, Non-tender, No carotid bruit, No jugular venous distention, No lymphadenopathy, No thyromegaly.    Respiratory:  Breath sounds are equal, Symmetrical chest wall expansion.         Respirations: Are within normal limits.         Pattern: Regular.         Breath sounds: Bilateral, Within normal limits.    Cardiovascular:  Normal rate, Regular rhythm, No murmur, Good pulses equal in all extremities, Normal peripheral perfusion, No edema.    Gastrointestinal:  Soft, Non-tender, Non-distended.    Musculoskeletal:  Normal range of motion, Normal strength, Normal gait.    Integumentary:  Warm, Dry, Intact, No rash.    Neurologic:  Alert, Oriented, Normal motor function, No focal deficits, Cranial Nerves II-XII are grossly intact.    Psychiatric:  Cooperative,  Appropriate mood & affect.       Review / Management   Results review:  Lab results   1/27/2020 6:16 PM CST U HCG POC NEGATIVE   1/27/2020 4:21 PM CST WBC 8.3    RBC 5.20    Hgb 14.3 g/dL    Hct 42.4 %    MCV 82 fL    MCH 27.5 pg    MCHC 33.7 g/dL    RDW 13.4 %    Platelet 329    MPV 9.4 fL   .         Interpretation: Labs unremarkable.       Impression and Plan   Diagnosis     Pre-op exam (ZXF52-OW Z01.818).     Cervical polyp (VWK53-XK N84.1).     Condition:  Stable, Procede with procedure/surgery..    Orders     No SBE prophylaxis or DVT prophylaxis necessary  she has stopped any aspirin /NSAIDS.     Informed patient to avoid aspirin and ibuprofen type products 10 days prior to surgery.

## 2022-02-15 NOTE — TELEPHONE ENCOUNTER
Per patient's request, order is faxed to [facility].Patient calls requesting order be sent to [facility].  Order is sent through the EMR interface.

## 2022-02-15 NOTE — LETTER
(Inserted Image. Unable to display)     January 06, 2019      SOFIA BOB  [address] [place], [place] [id number]          Dear SOFIA,      Thank you for selecting [facility] (previously [facility]) for your healthcare needs.      Our records indicate you are due for the following services:     Annual Physical and Gynecologic Exam ~ Yearly wellness exams are important for your ongoing health and wellness.  This exam gives you the opportunity to meet with your health care provider to review your health, update immunizations and to recommend preventive screenings that you may be due for.  At your wellness visit, your Healthcare Provider will determine the need for a gynecologic exam and/or Pap smear.      To schedule an appointment or if you have further questions, please contact your primary clinic:   [facility]       [phone]   [facility]       [phone]              [facility]     [phone]      Powered by [employer]    Sincerely,    Healthcare Providers at [facility]

## 2022-02-15 NOTE — TELEPHONE ENCOUNTER
---------------------  From: Domi Mendoza LPN (Phone Messages Pool ([id number]_[facility]))   To: [facility] Message Pool ([id number]_[place] [facility] [place]);     Sent: 7/2/2019 1:30:03 PM CDT  Subject: MRI results        Phone Message    PCP:         Time of Call:  1237       Person Calling:  Belem from [facility]   Phone number:  [phone]    Returned call at:     Note:   Calling to inform [facility] that they faxed over MRI of the brain with positive findings.  They want to make sure [facility] gets the fax and if she has any questions to call them.     Last office visit and reason:  06/24/2019 Office Visit Note---------------------  From: Carol Cagle CMA ([facility] Message Pool ([id number]))   To: Missy Ford MD;     Sent: 7/2/2019 1:38:53 PM CDT  Subject: FW: MRI results      THIERNO

## 2022-02-15 NOTE — PROGRESS NOTES
Chief Complaint    Semi-palegic migraine yesterday. Some SE yet today.  History of Present Illness      patient present to clinic today for follow up from the ER.  ER discharge summary reviewed.  ct was neg, no MRI was done.  pt's headache has resolved but she continues to have significant left sided weakness, says it has improved a little, but concerned because she gomez not know how long it is supposed to take to get better.  woke up yesterday morning with headache and hemiparesis.       [place], lots of tick on the dogs, has not noticed any on herself, no rashes noted      Review of systems is negative except as per HPI including:  no fevers, chills, sore throat, runny nose, nausea, vomiting, constipation, diarrhea, rash or new skin lesions, chest pain, palpitations, slurred speech, new paresthesia, shortness of breath or wheeze.      Exam:      General: alert and oriented ×3 no acute distress.      HEENT: pupils are equal round and reactive to light extraocular motion is intact. Normocephalic and atraumatic.       Hearing is grossly normal and there is no otorrhea.       Nares are patent there is no rhinorrhea.       Mucous membranes are moist and pink.      Chest: has bilateral rise with no increased work of breathing.      Cardiovascular: normal perfusion and brisk capillary refill.      Musculoskeletal: left arm drop left leg weakness, almost fell when trying to stand up, leans against her SO for support, left facial droop, speech fluent,      Neuro: left hemielegia and left facial droop and ataxia and and memory seems intact.      Psychiatric: speech is clear and coherent and fluent. Patient dressed appropriately for the weather. Mood is appropriate and affect is full.                     Discussed with patient to return to clinic if symptoms worsen or do not improve  Physical Exam   Vitals & Measurements    T: 99.6   F (Tympanic)  HR: 88(Peripheral)  BP: 122/84     WT: 127 lb    Assessment/Plan       Ataxia (R27.0)         Ordered:          Miscellaneous Rx Supply, Rollator, See Instructions, Instructions: use as directed, Supply, # 1 EA, 0 Refill(s), Type: Maintenance, Pharmacy: [facility], Pt will rent, use as directed, (Ordered)          Miscellaneous Rx Supply, Walker, See Instructions, Instructions: use as directed, Supply, # 1 EA, 0 Refill(s), Type: Maintenance, Pharmacy: [facility], Pt will rent, use as directed, (Ordered)          Miscellaneous Rx Supply, Wheelchair, See Instructions, Instructions: use as directed, Supply, # 1 EA, 0 Refill(s), Type: Maintenance, Pharmacy: [facility], Pt will rent, use as directed, (Ordered)          71436 office outpatient visit 25 minutes (Charge), Quantity: 1, Hemiplegia  Ataxia  Weakness  Fall          MRI Brain w/ + w/o Contrast (Request), Priority: Urgent, Hemiplegia  At low risk for fall  Ataxia  Weakness                Fall (W19.XXXA)         Ordered:          74337 office outpatient visit 25 minutes (Charge), Quantity: 1, Hemiplegia  Ataxia  Weakness  Fall                Hemiplegia (G81.90)         Ordered:          Miscellaneous Rx Supply, Rollator, See Instructions, Instructions: use as directed, Supply, # 1 EA, 0 Refill(s), Type: Maintenance, Pharmacy: [facility], Pt will rent, use as directed, (Ordered)          Miscellaneous Rx Supply, Walker, See Instructions, Instructions: use as directed, Supply, # 1 EA, 0 Refill(s), Type: Maintenance, Pharmacy: [facility], Pt will rent, use as directed, (Ordered)          Miscellaneous Rx Supply, Wheelchair, See Instructions, Instructions: use as directed, Supply, # 1 EA, 0 Refill(s), Type: Maintenance, Pharmacy: [facility], Pt will rent, use as directed, (Ordered)          93485 office outpatient visit 25 minutes (Charge), Quantity: 1, Hemiplegia  Ataxia  Weakness  Fall          MRI Brain w/ + w/o Contrast (Request), Priority: Urgent, Hemiplegia  At low risk for fall   Ataxia  Weakness                Weakness (R53.1)         Ordered:          Miscellaneous Rx Supply, Rollator, See Instructions, Instructions: use as directed, Supply, # 1 EA, 0 Refill(s), Type: Maintenance, Pharmacy: [facility], Pt will rent, use as directed, (Ordered)          Miscellaneous Rx Supply, Walker, See Instructions, Instructions: use as directed, Supply, # 1 EA, 0 Refill(s), Type: Maintenance, Pharmacy: [facility], Pt will rent, use as directed, (Ordered)          Miscellaneous Rx Supply, Wheelchair, See Instructions, Instructions: use as directed, Supply, # 1 EA, 0 Refill(s), Type: Maintenance, Pharmacy: [facility], Pt will rent, use as directed, (Ordered)          32120 office outpatient visit 25 minutes (Charge), Quantity: 1, Hemiplegia  Ataxia  Weakness  Fall          MRI Brain w/ + w/o Contrast (Request), Priority: Urgent, Hemiplegia  At low risk for fall  Ataxia  Weakness                Orders:         Return to Clinic (Request), RFV: f/u MRI         Review Orders for Potential Authorizations, 06/24/19 19:02:10 CDT         Review Orders for Potential Authorizations, 06/24/19 19:01:17 CDT         Review Orders for Potential Authorizations, 06/24/19 19:19:27 CDT      spoke with Dr. Willis from [facility], recommended MRI with/without contrast and close follow up  25 minutes spent with patient in direct face to face contact, > 50% of time spent counseling and coordinating care.          Patient Information     Name:SOFIA BOB      Address:      [address] [place] [id number]     Sex:Female     YOB: 1995     [phone]     Emergency Contact:ABIGAIL BOB     MRN:[id number]     FIN:[id number]     Location:[facility]     Date of Service:06/24/2019      Primary Care Physician:       NONE ,       Attending Physician:       Missy Ford MD, [phone]  Problem List/Past Medical History    Ongoing     Asthma      Dysmenorrhea     Dysmenorrhea in adolescent     Headache    Historical     Concussion with no loss of consciousness  Medications     propranolol: 60 mg.     Ventolin HFA 90 mcg/inh inhalation aerosol: 180 mcg, 2 puff(s), INH, QID, PRN: for shortness of breath or wheezing, 18 gm, 5 Refill(s).     SUMAtriptan 25 mg oral tablet: See Instructions, 2 tab(s) once  may repeat dose in 2 hours if needed, 9 tab(s), 1 Refill(s).     Estarylla 0.25 mg-35 mcg oral tablet: 1 tab(s), Oral, daily, 84 tab(s), 3 Refill(s).     Rollator: See Instructions, use as directed, 1 EA, 0 Refill(s).     Wheelchair: See Instructions, use as directed, 1 EA, 0 Refill(s).     Walker: See Instructions, use as directed, 1 EA, 0 Refill(s).      Allergies    Pollen  Social History    Smoking Status - 04/10/2019     Never smoker     Alcohol      Current, 1-2 times per month, 1 drinks/episode average. 2 drinks/episode maximum. Drinks more than intended: No., 05/07/2018     Employment/School      Student, Work/School description: [employer], studying elementary education. Plans to graduate in Dec 2018. Plans grad school.., 01/04/2018     Exercise      Exercise frequency: Never., 04/03/2015     Home/Environment      Marital status: Single. Family/Friends available for support: Yes. Risks in environment: owns secured gun., 01/04/2018     Nutrition/Health      Type of diet: Regular., 04/03/2015     Sexual      Sexually active: Yes. Identifies as female, Sexual orientation: Straight or heterosexual. Uses condoms: Yes. Contraceptive Use Details: Birth control pill., 01/04/2018     Substance Abuse      Never, 04/03/2015     Tobacco      Never, 04/03/2015  Family History    Mother: History is negative    Father: History is negative    Sister: History is negative  Immunizations      Vaccine Date Status      tetanus/diphth/pertuss (Tdap) adult/adol 05/03/2018 Given      influenza virus vaccine, inactivated 11/28/2016 Given      human papillomavirus vaccine 03/18/2009  Recorded      influenza virus vaccine, inactivated 10/27/2008 Recorded      human papillomavirus vaccine 08/28/2008 Recorded      tetanus/diphth/pertuss (Tdap) adult/adol 06/24/2008 Recorded      human papillomavirus vaccine 06/24/2008 Recorded      meningococcal conjugate vaccine 06/24/2008 Recorded      DTP (obsolete) 07/27/2001 Recorded      MMR (measles/mumps/rubella) 07/27/2001 Recorded      varicella 07/27/2001 Recorded      OPV 07/27/2001 Recorded      DTP (obsolete) 07/18/1997 Recorded      MMR (measles/mumps/rubella) 07/18/1997 Recorded      MMR (measles/mumps/rubella) 07/18/1997 Recorded      OPV 07/18/1997 Recorded      hepatitis B pediatric vaccine 07/18/1997 Recorded      Hib (PRP-T) 07/18/1997 Recorded      DTaP 01/24/1997 Recorded      DTP-Hib 01/24/1997 Recorded      OPV 1996 Recorded      DTP-Hib 1996 Recorded      Hib 1996 Recorded      OPV 1996 Recorded      DTaP 1996 Recorded      DTP-Hib 1996 Recorded      hepatitis B pediatric vaccine 1996 Recorded      hepatitis B pediatric vaccine 1996 Recorded  spoke with pt has not yet heard from [place] to schedule the MRI, so orderes have been faxed to [facility], if she is not capo to get the MRI done and continues to have hemiparesis I'd like her to get to the [facility] for eval and treatment.  pt is admitted at [facility], has had her spinal tap and is on her way to get an MRI.  feels good about her decision to go to ED last night, got the first bed available and feels like she is getting good care.

## 2022-02-15 NOTE — TELEPHONE ENCOUNTER
---------------------  From: Merry Dominguez RN   Sent: 11/30/2020 3:07:54 PM CST  Subject: Phone Message     Phone Message    PCP:   [facility]      Time of Call:  1342       Person Calling:  Pt  Phone number:  [phone]    Returned call at: [phone]    Note:   Pt called requesting BC refill. 1 month refill sent. Returned call and informed. She has appointment w/[facility] tomorrow.

## 2022-02-15 NOTE — LETTER
(Inserted Image. Unable to display)   November 11, 2020        SOFIA BOB  [address] [place], [place] [id number]        Dear SOFIA,      Thank you for selecting [facility] for your healthcare needs.    Our records indicate you are due for the following services:     Annual Physical and Gynecologic Exam ~ Yearly wellness exams are important for your ongoing health and wellness.  This exam gives you the opportunity to meet with your Healthcare Provider to review your health, update immunizations and to recommend preventive screenings that you may be due for.  At your wellness visit, your health care provider will determine the need for a gynecologic exam and/or pap smear.     (FYI   Regarding office visits: In some instances, a video visit or telephone visit may be offered as an option.)    To schedule an appointment or if you have further questions, please contact your clinic at [phone].      Powered by [employer]    Sincerely,    Missy Ford MD

## 2022-02-15 NOTE — PROGRESS NOTES
Patient:   SOFIA BOB            MRN: [id number]            FIN: [id number]               Age:   22 years     Sex:  Female     :  1995   Associated Diagnoses:   Cold   Author:   Beau Tee MD      Visit Information      Primary Care Provider (PCP):  NONE ,       Chief Complaint   10/3/2017 5:54 PM CDT    Cough x4 months.  Also hands and feet itching x5 days.     Upper Respiratory Symptoms      History of Present Illness             The patient presents with symptoms of an upper respiratory infection.  The symptoms of the upper respiratory infection are described as rhinorrhea, sore throat, nasal congestion, cough and yellow sputum.  The severity of the symptoms associated to the upper respiratory infection is moderate.  The timing/course of upper respiratory infection symptoms fluctuates in intensity.  The symptoms of upper respiratory infection have lasted for 2 week(s).  Exacerbating factors consist of none.  Relieving factors consist of none.  Associated symptoms consist of none.        Review of Systems   Constitutional:  Fatigue.    Eye:  Negative.    Ear/Nose/Mouth/Throat:  Nasal congestion.    Respiratory:  Cough, Sputum production, No shortness of breath, No wheezing.    Cardiovascular:  Negative.    Gastrointestinal:  Negative.    Genitourinary:  Negative.    Hematology/Lymphatics:  Negative.    Endocrine:  Negative.    Immunologic:  Negative.    Musculoskeletal:  Negative.    Integumentary:  Negative, No rash.    Neurologic:  Negative.    Psychiatric:  Negative.          All other systems reviewed and negative      Health Status   Allergies:    Allergic Reactions (Selected)  Severity Not Documented  Pollen (No reactions were documented)   Problem list:    All Problems  Dysmenorrhea in adolescent / SNOMED CT 828910953 / Confirmed   Medications:  (Selected)   Prescriptions  Prescribed  Atrovent 42 mcg/inh nasal spray: 2 spray(s), nasal, qid, PRN: for nasal congestion, # 1 EA, 2 Refill(s),  Type: Maintenance, Pharmacy: [facility] [id number], 2 spray(s) nasal qid,PRN:for nasal congestion  Augmentin 875 mg oral tablet: 1 tab(s), PO, q12hr, Instructions: with food or milk, # 20 tab(s), 0 Refill(s), Type: Maintenance, Pharmacy: [facility] [id number], 1 tab(s) po q12 hrs,x10 day(s),Instr:with food or milk  Azithromycin 5 Day Dose Pack 250 mg oral tablet: 2 tabs today then 1 a day for 4 days, PO, qAM, x 5 day(s), Instructions: as directed on package labeling, # 6 tab(s), 0 Refill(s), Type: Acute, Pharmacy: [facility] [id number], 2 tabs today then 1 a day for 4 days po qam,x5 day(s),Instr:as direct...  Mononessa 0.25 mg-35 mcg oral tablet: 1 tab(s), po, daily, # 90 tab(s), 3 Refill(s), Type: Maintenance, Pharmacy: [facility], Due for visit before any more refills, 1 tab(s) po daily  SUMAtriptan 25 mg oral tablet: See Instructions, Instructions: 2 tab(s) once  may repeat dose in 2 hours if needed, # 9 tab(s), 1 Refill(s), Type: Soft Stop, Pharmacy: [facility], 2 tab(s) once; may repeat dose in 2 hours if needed  Ventolin HFA 90 mcg/inh inhalation aerosol: 2 puff(s) ( 180 mcg ), INH, QID, PRN: for shortness of breath or wheezing, # 18 gm, 5 Refill(s), Type: Maintenance, Pharmacy: [facility], 2 puff(s) inh qid,PRN:for shortness of breath or wheezing  omeprazole 20 mg oral delayed release capsule: 1 cap(s) ( 20 mg ), PO, Daily, # 30 cap(s), 0 Refill(s), Type: Maintenance, Pharmacy: [facility] [address], 1 cap(s) po daily,x30 day(s)  predniSONE 10 mg oral tablet: 4 tabs daily for 3 days taper bey 1 tab every 3 days, PO, Daily, # 30 tab(s), 0 Refill(s), Type: Maintenance, Pharmacy: [facility] [address], 4 tabs daily for 3 days taper bey 1 tab every 3 days po daily  Documented Medications  Documented  propranolol: ( 60 mg ), 0 Refill(s), Type: Maintenance      Histories   Past Medical History:    No active or resolved past medical  history items have been selected or recorded.   Family History:    No family history items have been selected or recorded.   Procedure history:    No active procedure history items have been selected or recorded.   Social History:        Alcohol Assessment            Never      Tobacco Assessment            Never      Substance Abuse Assessment            Never      Employment and Education Assessment            Student      Home and Environment Assessment            Marital status: Single.      Nutrition and Health Assessment            Type of diet: Regular.      Exercise and Physical Activity Assessment            Exercise frequency: Never.      Sexual Assessment            Sexually active: No.        Physical Examination   Vital Signs   10/3/2017 5:54 PM CDT Temperature Tympanic 99.2 DegF    Peripheral Pulse Rate 80 bpm    HR Method Electronic    Systolic Blood Pressure 97 mmHg    Diastolic Blood Pressure 63 mmHg    Mean Arterial Pressure 74 mmHg    BP Method Electronic      Measurements from flowsheet : Measurements   10/3/2017 5:54 PM CDT    Weight Measured - Standard                116.6 lb     General:  Alert and oriented, No acute distress.    Eye:  Normal conjunctiva.    HENT:  Normocephalic, Tympanic membranes are clear, Oral mucosa is moist, No pharyngeal erythema.    Neck:  Supple, Non-tender, No lymphadenopathy, No thyromegaly.    Respiratory:  Breath sounds are equal, Symmetrical chest wall expansion.         Respirations: Are within normal limits.         Pattern: Regular.         Breath sounds: Bilateral, Within normal limits.    Cardiovascular:  Normal rate, Regular rhythm, No murmur, Normal peripheral perfusion, No edema.    Gastrointestinal:  Soft, Non-tender, Non-distended, Normal bowel sounds, No organomegaly.    Genitourinary:  No costovertebral angle tenderness.    Integumentary:  Warm, Dry, No rash.       Review / Management   Radiology results   Reveals no acute disease process       Impression and Plan   Diagnosis     Cold (JUA67-SW J00).     Course:  Not progressing as expected.    Plan:  Given the 4-week history will cover for atypical's  also pred taper  fu 1 week if not better sooner if worse.         Refer to: Reviewed when to return to clinic and anticipatory guidance. Also reviewed to return sooner if no improvement or worsening.    Patient Instructions:       Counseled: Patient, Regarding diagnosis, Regarding treatment, Regarding medications, Regarding activity, Verbalized understanding.    Orders

## 2022-02-15 NOTE — NURSING NOTE
Asthma Control Test (ACT) Total Entered On:  12/26/2020 10:03 AM CST    Performed On:  12/17/2020 10:03 AM CST by Maricruz Soliz               Asthma Control Test (ACT) Total   Asthma Control Test Total (Adult) :   25    Maricruz Soliz - 12/26/2020 10:03 AM CST

## 2022-02-15 NOTE — TELEPHONE ENCOUNTER
Entered by Nancy Apodaca CMA on December 15, 2020 7:20:53 AM CST  ---------------------  From: Nancy Apodaca CMA   To: [facility] [address] [facility]    Sent: 12/15/2020 7:20:53 AM CST  Subject: Medication Management     ** Rx Change Denied: Patient needs appointment, has appt 12/17/20, #28 sent 12/1/20 **  (SPRINTEC 28 DAY TABLET)   TAKE 1 TABLET BY MOUTH EVERY DAY. OFFICE VISIT NEEDED FOR MORE REFILLS.  Qty:  28 tab(s)        Days Supply:  28        Refills:  0          Substitutions Allowed     Route To Pharmacy - [place] [address] [facility]   Note from Pharmacy:  REQUEST FOR 90 DAYS PRESCRIPTION.  Signed by Nancy Apodaca CMA            From: [facility] [address] [facility]  To: Missy Ford MD  Sent: December 14, 2020 9:50:30 AM CST  Subject: Medication Management  Due: December 15, 2020 9:50:30 AM CST     Originally Prescribed Drug:  Drug: ethinyl estradiol-norgestimate (Sprintec 0.25 mg-35 mcg oral tablet), TAKE 1 TABLET BY MOUTH EVERY DAY. OFFICE VISIT NEEDED FOR MORE REFILLS.  Quantity: 28 tab(s)  Days Supply: 28  Refills: 0  Substitutions Allowed  Notes from Pharmacy: REQUEST FOR 90 DAYS PRESCRIPTION.     ** On Hold Pending Signature **  Preferred Alternative Drug: ethinyl estradiol-norgestimate (Sprintec 0.25 mg-35 mcg oral tablet), TAKE 1 TABLET BY MOUTH EVERY DAY. OFFICE VISIT NEEDED FOR MORE REFILLS.  Quantity: 84 tab(s)  Days Supply: 84  Refills: 1  Substitutions Allowed  Notes from Pharmacy: REQUEST FOR 90 DAYS PRESCRIPTION.

## 2022-02-15 NOTE — PROCEDURES
Accession Number:       [id number]  CLINICAL INFORMATION::     None given  LMP::     NONE GIVEN  PREV. PAP::     NONE GIVEN  PREV. BX::     NONE GIVEN  SOURCE::     Cervix  STATEMENT OF ADEQUACY::     Satisfactory for evaluation. Endocervical/transformation zone component present. Age and/or menstrual status not provided  GENERAL CATEGORIZATION::     EPITHELIAL CELL ABNORMALITY  INTERPRETATION/RESULT::     Low Grade Squamous Intraepithelial Lesion (LSIL)  INFECTION::     Shift in vaginal cachorro suggestive of bacterial vaginosis.  COMMENT::     See comment       This case could not be evaluated with computer       assisted technology. The slide was manually       screened according to routine procedures.       Suggest clinical correlation and follow-up as       clinically appropriate  CYTOTECHNOLOGIST::     NINO STARR (ASCP) CT Screening location: [place] [address]  PATHOLOGIST::     See comment       Quan Kimball M.D., Board Certified in Anatomic       Pathology, Clinical Pathology, Specializing in       Gynecological Pathology       5   (electronic signature)

## 2022-02-15 NOTE — TELEPHONE ENCOUNTER
---------------------  From: Giselle Horne CMA (Phone Messages Pool ([id number]_[facility]))   To: [facility] Message Pool (32224_[facility]);     Sent: 2/7/2020 11:57:02 AM CST  Subject: Phone Message     Phone Message    PCP:   FILI HINES, last saw [facility].      Time of Call:  1134       Person Calling:  Christianne from [facility].  Phone number:  [phone]    Note:   Christianne needs to ask specific questions about the procedures patient needs to see, if your clinic would do them, or we would do them. Tried calling Christianne back three times got a busy signal.     Last office visit and reason:  01-27-20 preoperative general with [facility].---------------------  From: Abbi He LPN ([facility] Message Pool (32224_[facility]))   To: Elaine Pretty;     Sent: 2/7/2020 12:52:41 PM CST  Subject: FW: Phone Message---------------------  From: Elaine Pretty   To: [facility] Message Pool (32224_[facility]);     Sent: 2/7/2020 1:52:27 PM CST  Subject: RE: Phone Message     I am not sure what this is about, if you are able to reach her, let me know. her procedure for which I did the preop was on 1/30/204:46pm Line was busy. says call can not be completed.

## 2022-02-15 NOTE — TELEPHONE ENCOUNTER
Entered by Sakshi Ordaz RN on November 09, 2020 2:03:25 PM CST  ---------------------  From: Sakshi Ordaz RN   To: [facility] [address] [facility]    Sent: 11/9/2020 2:03:25 PM CST  Subject: Medication Management     ** Submitted: **  Order:ethinyl estradiol-norgestimate (Sprintec 0.25 mg-35 mcg oral tablet)  See Instructions  TAKE 1 TABLET BY MOUTH EVERY DAY  Qty:  28 tab(s)        Refills:  0          Substitutions Allowed     Route To [facility] [address] [facility]    Signed by Sakshi Ordaz RN  11/9/2020 7:59:00 PM [facility]    ** Submitted: **  Complete:ethinyl estradiol-norgestimate (Estarylla 0.25 mg-35 mcg oral tablet)   Signed by Sakshi Ordaz RN  11/9/2020 8:03:00 PM [facility]    ** Not Approved:  **  ethinyl estradiol-norgestimate (SPRINTEC 28 DAY TABLET)  TAKE 1 TABLET BY MOUTH EVERY DAY  Qty:  84 tab(s)        Days Supply:  84        Refills:  3          Substitutions Allowed     Route To Pharmacy [facility] [address] [facility]   Signed by Sakshi Ordaz RN            ** Patient matched by Sakshi Ordaz RN on 11/9/2020 1:57:57 PM CST **      ------------------------------------------  From: [facility] [address] [facility]  To: Missy Ford MD  Sent: November 9, 2020 12:39:17 AM CST  Subject: Medication Management  Due: November 7, 2020 12:21:53 AM CST     ** On Hold Pending Signature **     Dispensed Drug: ethinyl estradiol-norgestimate (Sprintec 0.25 mg-35 mcg oral tablet), TAKE 1 TABLET BY MOUTH EVERY DAY  Quantity: 84 tab(s)  Days Supply: 84  Refills: 3  Substitutions Allowed  Notes from Pharmacy:  ------------------------------------------Medication Refill    PCP:   FILI    Name of med requested:  Sprintec    Date of last office visit and reason:  3/9/20, sinus      Date of last Med Check / Px:   1/27/20    Date of last labs pertaining to med:  _    RTC order in chart:  Placed today    For Protocol refill, has patient been contacted:  Message sent to pharmacy

## 2022-02-15 NOTE — TELEPHONE ENCOUNTER
Patient Information     Name:SOFIA BOB      Address:      [address] [address] [place] [id number]     Sex:Female     YOB: 1995     [phone]     Emergency Contact:ABIGAIL BOB     MRN:[id number]     FIN:[id number]     Location:[facility]     Date of Service:07/02/2019      Primary Care Physician:       NONE ,    Called patient this evening at approximately 9:00 PM to review the results of her MRI.  Discussed with her that these are concerning for multiple sclerosis or another demyelinating condition.  Reached out to the [facility] neurologist on-call.  He recommended that patient come into the emergency room.  He felt that if she had active symptoms given these findings that she should be hospitalized and that this would expedite her work-up.  Patient reports that her boyfriend is available to take her in.  She will plan to go to the [facility] where the neurologist are on-site.

## 2022-02-15 NOTE — PROGRESS NOTES
Chief Complaint    c/o left ear pain this morning  History of Present Illness       Left ear started hurting this morning. No drainage from the ear. No hearing loss.       Has had two sets of tubes in each ear and need for tympanoplasty with multiple ruptured ear drum       Takes vumerity for multiple sclerosis  Review of Systems       No fevers       No vomiting       Working as a nanny  Physical Exam   Vitals & Measurements    T: 97.9  F (Tympanic)  HR: 80 (Peripheral)  BP: 120/66     HT: 62 in  HT: 62 in  WT: 125.9 lb  BMI: 23.02        General: No acute distress       Musculoskeletal: Normal gait       Neck: Mild adenopathy       Ears: Both occluded by cerumen soft.  Irrigation performed to remove the cerumen.  Right tympanic membrane normal.  Left tympanic Membrane erythematous and opaque in places.  Assessment/Plan       Left otitis media: Treat with amoxicillin follow-up if not improving.  Patient Information     Name:SOFIA BOB      Address:      [address] [place] [id number]     Sex:Female     YOB: 1995     [phone]     Emergency Contact:ABIGAIL BOB     MRN:[id number]     FIN:[id number]     Location:[facility]     Date of Service:05/03/2021      Primary Care Physician:       Missy Ford MD, [phone]      Attending Physician:       Bridger Maddox MD, [phone]  Problem List/Past Medical History    Ongoing     Asthma     Cervical polyp     Dysmenorrhea     Dysmenorrhea in adolescent     Headache     Long-term use of immunosuppressant medication     Multiple sclerosis    Historical     Concussion with no loss of consciousness  Procedure/Surgical History     Tympanoplasty (2008)     Tympanostomy (1999)  Medications    Albuterol (Eqv-Proventil HFA) 90 mcg/inh inhalation aerosol, 2 puff(s), Inhale, qid, 2 refills    Sprintec 0.25 mg-35 mcg oral tablet, 1 tab(s), Oral, daily, 3 refills    valved holding chamber spacer, See Instructions     Vumerity, 462 mg, Oral, bid    Walker, See Instructions    Wheelchair, See Instructions  Allergies    Pollen

## 2022-02-15 NOTE — PROGRESS NOTES
Patient:   SOFIA BOB            MRN: [id number]            FIN: [id number]               Age:   24 years     Sex:  Female     :  1995   Associated Diagnoses:   Acute sinusitis   Author:   Vineet Camargo MD      Chief Complaint   3/9/2020 6:39 PM CDT     c/o sinus pressure, jaw pain, headache, cough x 1.5 weeks        History of Present Illness             The patient presents with sinus problem.  The sinus problem is located in the maxillary sinus.  The sinus problem is characterized by nasal congestion, rhinorrhea, facial pain and not headache.  The severity of the sinus problem is mild.  The sinus problem is worsening.  The sinus problem has lasted for 1 week(s).  The context of the sinus problem: occurred in association with illness.  Exacerbating factors consist of movement and turning head.  Relieving factors consist of analgesics and decongestant.  Associated symptoms consist of cough, ear pain, sore throat and denies dizziness.        Review of Systems   Constitutional:  Negative except as documented in history of present illness.    Respiratory:  Negative except as documented in history of present illness.    Cardiovascular:  Negative except as documented in history of present illness.    Gastrointestinal:  Negative except as documented in history of present illness.    Integumentary:  No rash.    Neurologic:  No abnormal balance, No headache.       Health Status   Allergies:    Allergic Reactions (Selected)  Severity Not Documented  Pollen (No reactions were documented)   Medications:  (Selected)   Prescriptions  Prescribed  Estarylla 0.25 mg-35 mcg oral tablet: 1 tab(s), Oral, daily, # 84 tab(s), 3 Refill(s), Type: Maintenance, Pharmacy: [facility], due for visit, 1 tab(s) Oral daily  SUMAtriptan 25 mg oral tablet: See Instructions, Instructions: 2 tab(s) once  may repeat dose in 2 hours if needed, # 9 tab(s), 1 Refill(s), Type: Soft Stop, [facility]  [id number], 2 tab(s) once; may repeat dose in 2 hours if needed  Ventolin HFA 90 mcg/inh inhalation aerosol: 2 puff(s) ( 180 mcg ), INH, QID, PRN: for shortness of breath or wheezing, # 18 gm, 5 Refill(s), Type: Maintenance, Pharmacy: [facility], 2 puff(s) inh qid,PRN:for shortness of breath or wheezing  Walker: Walker, See Instructions, Instructions: use as directed, Supply, # 1 EA, 0 Refill(s), Type: Maintenance, Pharmacy: [facility], Pt will rent, use as directed  Wheelchair: Wheelchair, See Instructions, Instructions: use as directed, Supply, # 1 EA, 0 Refill(s), Type: Maintenance, Pharmacy: [facility], Pt will rent, use as directed  amoxicillin 875 mg oral tablet: = 1 tab(s) ( 875 mg ), PO, BID, # 20 tab(s), 0 Refill(s), Type: Maintenance, Pharmacy: [facility], 1 tab(s) Oral bid,x10 day(s)  Documented Medications  Documented  Tecfidera 240 mg oral delayed release capsule: = 1 cap(s) ( 240 mg ), Oral, bid, 0 Refill(s), Type: Maintenance,    Medications          *denotes recorded medication          Walker: See Instructions, use as directed, 1 EA, 0 Refill(s).          Wheelchair: See Instructions, use as directed, 1 EA, 0 Refill(s).          SUMAtriptan 25 mg oral tablet: See Instructions, 2 tab(s) once  may repeat dose in 2 hours if needed, 9 tab(s), 1 Refill(s).          Ventolin HFA 90 mcg/inh inhalation aerosol: 180 mcg, 2 puff(s), INH, QID, PRN: for shortness of breath or wheezing, 18 gm, 5 Refill(s).          amoxicillin 875 mg oral tablet: 875 mg, 1 tab(s), PO, BID, for 10 day(s), 20 tab(s), 0 Refill(s).          *Tecfidera 240 mg oral delayed release capsule: 240 mg, 1 cap(s), Oral, bid, 0 Refill(s).          Estarylla 0.25 mg-35 mcg oral tablet: 1 tab(s), Oral, daily, 84 tab(s), 3 Refill(s).       Problem list:    All Problems (Selected)  Dysmenorrhea in adolescent / 317810894 / Confirmed  Dysmenorrhea / 266667356 / Confirmed  Headache / 96587738 /  Confirmed  Asthma / 401624242 / Confirmed  Multiple sclerosis / 83343118 / Confirmed  Long-term use of immunosuppressant medication / 0553188674 / Confirmed  Cervical polyp / 248569881 / Confirmed      Histories   Past Medical History:    Active  Dysmenorrhea (697158427)  Headache (87772868)  Asthma (373252139)  Resolved  Concussion with no loss of consciousness (190568702): Onset in 2015 at 20 years.  Resolved.   Family History:       Procedure history:    Tympanoplasty (SNOMED CT 0193172421) in 2008 at 13 Years.  Tympanostomy (SNOMED CT 0633259800) in 1999 at 4 Years.   Social History:        Alcohol Assessment            Current, 1-2 times per month, 1 drinks/episode average.  2 drinks/episode maximum.  Drinks more than               intended: No.      Tobacco Assessment            Never      Substance Abuse Assessment            Never      Employment and Education Assessment            Student, Work/School description: [employer], studying elementary education. Plans to graduate in Dec 2018.  Plans               grad school..      Home and Environment Assessment            Marital status: Single.  Family/Friends available for support: Yes.  Risks in environment: owns secured gun.      Nutrition and Health Assessment            Type of diet: Regular.      Exercise and Physical Activity Assessment            Exercise frequency: Never.      Sexual Assessment            Sexually active: Yes.  Identifies as female, Sexual orientation: Straight or heterosexual.  Uses condoms:               Yes.  Contraceptive Use Details: Birth control pill.        Physical Examination   Vital Signs   3/9/2020 6:39 PM CDT Temperature Tympanic 98.7 DegF    Peripheral Pulse Rate 74 bpm    Systolic Blood Pressure 120 mmHg    Diastolic Blood Pressure 64 mmHg    Mean Arterial Pressure 83 mmHg      Measurements from flowsheet : Measurements   3/9/2020 6:39 PM CDT Height Measured 62 in    Weight Measured 133.8 lb    BSA 1.63 m2    Body Mass Index  24.47 kg/m2      General:  Alert and oriented, No acute distress.    Eye:  Pupils are equal, round and reactive to light, Normal conjunctiva.    HENT:  Oral mucosa is moist.    Neck:  Supple, No lymphadenopathy.    Respiratory:  Lungs are clear to auscultation, Respirations are non-labored.    Cardiovascular:  Normal rate, Regular rhythm, No edema.    Gastrointestinal:  Non-distended.    Musculoskeletal:  Normal gait.    Integumentary:  Warm, No rash.    Psychiatric:  Cooperative, Appropriate mood & affect, Normal judgment.       Review / Management   Results review:  Lab results   1/27/2020 6:16 PM CST U HCG POC NEGATIVE   1/27/2020 4:21 PM CST WBC 8.3    RBC 5.20    Hgb 14.3 g/dL    Hct 42.4 %    MCV 82 fL    MCH 27.5 pg    MCHC 33.7 g/dL    RDW 13.4 %    Platelet 329    MPV 9.4 fL   12/6/2019 2:27 PM CST U HCG POC NEGATIVE   .       Impression and Plan   Diagnosis     Acute sinusitis (ZFB46-DC J01.90).     Course:  discussed sinus infections and what to watch for and when to return  reviewed [place] pots, steam and saline washes  talked about sudafed and decongestent effectsa nd side effects  reviewed Afrin but the absolute time limit of 5 days.    Plan:  not effecting asthma but patient will watch.    Orders     Orders (Selected)   Prescriptions  Prescribed  amoxicillin 875 mg oral tablet: = 1 tab(s) ( 875 mg ), PO, BID, # 20 tab(s), 0 Refill(s), Type: Maintenance, Pharmacy: [facility], 1 tab(s) Oral bid,x10 day(s).

## 2022-02-15 NOTE — NURSING NOTE
Prior to leaving the clinic, but after OCP had been sent to [facility] patient stated that she would like to have a 30 day rx of her OCP sent to [facility] so that she can start with her next period, and then have the 90 day supply for the year sent to [facility].     Re-sent new 30 day supply to [facility], and then a new rx to [facility].

## 2022-02-15 NOTE — NURSING NOTE
Comprehensive Intake Entered On:  6/24/2019 6:19 PM CDT    Performed On:  6/24/2019 6:13 PM CDT by Blanca Damian CMA               Summary   Chief Complaint :   Semi-palegic migraine yesterday. Some SE yet today.    Menstrual Status :   Menarcheal   Weight Measured :   127 lb(Converted to: 127 lb 0 oz, 57.61 kg)    Systolic Blood Pressure :   122 mmHg   Diastolic Blood Pressure :   84 mmHg (HI)    Mean Arterial Pressure :   97 mmHg   Peripheral Pulse Rate :   88 bpm   BP Site :   Right arm   Pulse Site :   Radial artery   BP Method :   Manual   HR Method :   Manual   Temperature Tympanic :   99.6 DegF(Converted to: 37.6 DegC)    Blanca Damian CMA - 6/24/2019 6:13 PM CDT   Health Status   Allergies Verified? :   Yes   Medication History Verified? :   Yes   Pre-Visit Planning Status :   Not completed   Blanca Damian CMA - 6/24/2019 6:13 PM CDT   Meds / Allergies   (As Of: 6/24/2019 6:19:57 PM CDT)   Allergies (Active)   Pollen  Estimated Onset Date:   Unspecified ; Created By:   Candis Lr MA; Reaction Status:   Active ; Category:   Drug ; Substance:   Pollen ; Type:   Allergy ; Updated By:   Candis Lr MA; Reviewed Date:   4/30/2018 5:00 PM CDT        Medication List   (As Of: 6/24/2019 6:19:57 PM CDT)   Prescription/Discharge Order    albuterol  :   albuterol ; Status:   Prescribed ; Ordered As Mnemonic:   Ventolin HFA 90 mcg/inh inhalation aerosol ; Simple Display Line:   180 mcg, 2 puff(s), INH, QID, PRN: for shortness of breath or wheezing, 18 gm, 5 Refill(s) ; Ordering Provider:   Dianne Esquivel; Catalog Code:   albuterol ; Order Dt/Tm:   11/28/2016 12:21:08 PM          Estarylla 0.25 mg-35 mcg oral tablet  :   Estarylla 0.25 mg-35 mcg oral tablet ; Status:   Prescribed ; Ordered As Mnemonic:   Estarylla 0.25 mg-35 mcg oral tablet ; Simple Display Line:   1 tab(s), Oral, daily, 84 tab(s), 3 Refill(s) ; Ordering Provider:   Christie De Dios; Catalog Code:   ethinyl  estradiol-norgestimate ; Order Dt/Tm:   12/11/2018 9:28:00 AM          SUMAtriptan  :   SUMAtriptan ; Status:   Prescribed ; Ordered As Mnemonic:   SUMAtriptan 25 mg oral tablet ; Simple Display Line:   See Instructions, 2 tab(s) once  may repeat dose in 2 hours if needed, 9 tab(s), 1 Refill(s) ; Ordering Provider:   Christie De Dios; Catalog Code:   SUMAtriptan ; Order Dt/Tm:   1/4/2018 10:23:56 AM            Home Meds    propranolol  :   propranolol ; Status:   Documented ; Ordered As Mnemonic:   propranolol ; Simple Display Line:   60 mg ; Catalog Code:   propranolol ; Order Dt/Tm:   6/30/2015 11:23:41 AM

## 2022-02-15 NOTE — PROGRESS NOTES
Chief Complaint    Physical  History of Present Illness      Patient present to clinic today for annual wellness exam and to refill OCP.  She has been using OCP for contraception.  She denies possibility of pregnancy and declines UPT today.  Is aware of various alternatives for contraception including nexplanon, OCP, nuvaring, patch, Depo Provera, IUD and IUS, NFP, diaphragm, condoms, abstinence and sterilization. She is aware of risks associated with estrogen including stroke, DVT, PE, CVA, MI and would like to proceed with OCP refill.    Also counseled patient that all patients of reproductive age should be taking 400 mcg folic acid daily to reduce risks of 2 birth defects.      Pt has MS, follows with Dr. Dee at the [facility], appt next week coming up, on immunosuppressants, has kept the MS in remission but concern that it may also increase her risk of cancer.  We will do a pap smear this year instead of waiting until next year.      dysmenorrhea, improved but not resolved with ocp, tried cont cycle control but had several months of break through bleeding      needs flu vaccine      Review of systems is negative except as per HPI including:  no fevers, chills, sore throat, runny nose, nausea, vomiting, constipation, diarrhea, rash or new skin lesions, chest pain, palpitations, slurred speech, new paresthesia, shortness of breath or wheeze. she also denies and genital lesions or sores or discharge or itching, no pelvic pain.      Exam:      General: alert and oriented ×3 no acute distress.      HEENT: pupils are equal round and reactive to light extraocular motion is intact. Normocephalic and atraumatic.       Hearing is grossly normal and there is no otorrhea.       Nares are patent there is no rhinorrhea.       Mucous membranes are moist and pink.      Chest: has bilateral rise with no increased work of breathing.      Cardiovascular: normal perfusion and brisk capillary refill.      Musculoskeletal: no  gross focal abnormalities and normal gait.      Neuro: no gross focal abnormalities and memory seems intact.      Psychiatric: speech is clear and coherent and fluent. Patient dressed appropriately for the weather. Mood is appropriate and affect is full.                     Discussed with patient to return to clinic if symptoms worsen or do not improve, use condoms for back up for the first month to reduce risk of pregnancy and always use condoms to reduce risk of STD transmission.  Use sunscreen to reduce risk of skin cancer, refer to my healthy plate for information regarding diet and American Heart association web site for exercise recommendations.       using sunscreen, protecting from sunburn, monthly self skin checks      taking folic acid 400 mcg daily      refer to Sezionchoosemyplate.gov, AHA and ADA for diet and exercise recommendations      consume [id number] mg calcium daily      std screening      regular self skin checks      RTC in the Fall for flu shot  Physical Exam   Vitals & Measurements    HR: 93 (Peripheral)  RR: 16  BP: 90/60  SpO2: 98%     HT: 62.5 in  WT: 124.6 lb  BMI: 22.42   Assessment/Plan       Cervical cancer screening (Z12.4)        pap today         Ordered:          Thinprep tis pap reflex hpv mRNA Chlamydia/N.Gonorrhoeae* ([facility]), Specimen Type: Pap, Collection Date: 12/28/21 8:22:00 CST                Counseled about COVID-19 virus infection (Z71.89)         discussed that she would be a candidate for a 3rd Pfizer vaccine for covid as a primary vaccine series and then a candidate for a booster 6 months later, she has had difficulty getting an appointment to get her 3rd covid dose so would prefer to get Moderna here, also offered full dose moderna vs half dose as booster, would prefer to get the booster dose which was provided today.  she could discuss vaccine recommendations with her Neurologist and we could consider offering another booster in 6 months.                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 Dee with Neurology net week,         Ordered:          50048 office o/p est low 20-29 min (Charge), Quantity: 1, Immunosuppression due to drug therapy  Dysmenorrhea  Multiple sclerosis                Need for vaccination (Z23)         flu and covid booster provided today         Ordered:          influenza virus vaccine, inactivated, 0.5 mL, IM, once, (Completed)          SARS-CoV-2 (COVID-19) mRNA-1273 vaccine, 0.25 mL, IM, once, (Completed)          0013A adm sarscov2 100mcg/0.5ml 3rd (Charge), Quantity: 1, Need for vaccination          65281 iiv4 vacc no prsv 0.5 ml im (Charge), Quantity: 1, Need for vaccination          62612 sarscov2 vaccine 100mcg/0.5ml im use (Charge), Quantity: 1, Need for vaccination                Well adult exam (Z00.00)                Orders:         ethinyl estradiol-norgestimate, See Instructions, Instructions: TAKE 21 DAYS OF ACTIVE TABLETS, SKIP PLACEBO TABLETS AND START NEXT PACK AS DIRECTED., # 28 tab(s), 0 Refill(s), Type: Maintenance, Pharmacy: [id number], TAKE 21 DAYS OF ACTIVE TABLETS, SKIP PLACEBO TAB..., (Ordered)      using joint medical decision making and in light of pt being on immunosuppressants we will do a pap smear this year instead of waiting until next year         Patient Information     Name:PAULINO  SOFIA ROOT      Address:      [address] [place] [address] [address] [place], [place] [id number]     Sex:Female     YOB: 1995     [phone]     Emergency Contact:ABIGAIL BOB     MRN:[id number]     FIN:[id number]     Location:[facility]     Date of Service:12/28/2021      Primary Care Physician:       Missy Ford MD, [phone]      Attending Physician:       Missy Ford MD, [phone]  Problem List/Past Medical History    Ongoing     Asthma     Cervical polyp     Dysmenorrhea     Dysmenorrhea in adolescent     Headache     Long-term use of immunosuppressant medication     Multiple sclerosis    Historical     Concussion with no loss of consciousness  Procedure/Surgical History     Tympanoplasty (2008)     Tympanostomy (1999)  Medications    Albuterol (Eqv-Proventil HFA) 90 mcg/inh inhalation aerosol, 2 puff(s), Inhale, qid, 2 refills    Estarylla 0.25 mg-35 mcg oral tablet, See Instructions    valved holding chamber spacer, See Instructions    Vumerity, 462 mg, Oral, bid  Allergies    Pollen  Social History    Smoking Status     Never smoker     Alcohol      Current, Liquor (Hard) (1.5 oz), 1-2 times per month, 1 drinks/episode average. 3 drinks/episode maximum. Drinks more than intended: No.     Electronic Cigarette/Vaping      Electronic Cigarette Use: Never.     Employment/School      Student, Work/School description: [employer], studying elementary education. Plans to graduate in Dec 2018. Plans grad school..     Exercise      Exercise frequency: Never.     Home/Environment      Marital status: Single. Family/Friends available for support: Yes. Risks in environment: owns secured gun.     Nutrition/Health      Type of diet: Regular.     Sexual      Sexually active: Yes. Identifies as female, Sexual orientation: Straight or heterosexual. Uses condoms: Yes. Contraceptive Use Details: Birth control pill.     Substance Abuse      Never     Tobacco      Never (less than 100 in lifetime)  Family  History    CA - Breast cancer: Grandmother (P).    Mother: History is negative    Father: History is negative    Sister: History is negative  Immunizations       Scheduled Immunizations       Dose Date(s)       DTaP       01/24/1997, 1996, 07/18/1997, 07/27/2001       DTaP-Hib       1996, 1996, 01/24/1997       hepatitis B pediatric vaccine       07/18/1997, 1996, 1996       Hib       1996       Hib (PRP-T)       07/18/1997       human papillomavirus vaccine       06/24/2008, 08/28/2008, 03/18/2009       influenza virus vaccine, inactivated       10/27/2008, 10/15/2013       meningococcal conjugate vaccine       06/24/2008       MMR (measles/mumps/rubella)       07/27/2001, 07/18/1997, 07/18/1997       SARS-CoV-2 (COVID-19) Pfizer-162b2       03/23/2021, 04/13/2021       tetanus/diphth/pertuss (Tdap) adult/adol       06/24/2008       varicella       07/27/2001       Other Immunizations               DTP-Hib       1996, 1996, 01/24/1997       DTP (obsolete)       07/18/1997, 07/27/2001       OPV       1996, 1996, 07/18/1997, 07/27/2001       influenza virus vaccine, inactivated       11/28/2016, 10/31/2019, 12/18/2020       tetanus/diphth/pertuss (Tdap) adult/adol       05/03/2018

## 2022-02-15 NOTE — TELEPHONE ENCOUNTER
"---------------------  From: Viktoria Farr LPN (Phone Messages Pool ([id number]_[facility]))   To: [facility] Message Pool ([id number]);     Sent: 1/22/2020 4:44:02 PM CST  Subject: General Message     Received walk in slip in .    Pt wanting to let [facility] know \"all is good for birth control- Dr. Dee said it would not interfere with the Tecfidera.\"    Pt requesting 1 year Rx for OCP (pt should have Rx at pharmacy- sent 12/27/19 #83 with 4 refills)    Pt says she can be reached at [phone]---------------------  From: Carol Cagle CMA (P Message Pool ([id number]_[facility]))   To: Missy Ford MD;     Sent: 1/22/2020 4:48:00 PM CST  Subject: FW: General Message---------------------  From: Missy Ford MD   To: [facility] Message Pool ([id number]_[place] [facility] [place]);     Sent: 1/23/2020 1:59:50 PM CST  Subject: RE: General Message     please let pt know thanks fo [facility] e update, we did send a 3 onth supply with 3 refills when we d=saw eachoother in decemberpatient notified at 1405  "

## 2022-02-15 NOTE — LETTER
(Inserted Image. Unable to display)       November 06, 2019      SOFIA BOB  [address] [place], [place] [id number]        Dear SOFIA,     Thank you for selecting [facility] for your healthcare needs. Below you will find the results of your recent test(s) done at our clinic.      Your pap smear cells looked atypical.  This then triggered the lab to check to see if you have high risk HPV.  You do not have high risk HPV.  You  should have your next pap smear in 3 years.        Result Name Current Result Previous Result   ThinPrep PAP with HPV   10/31/2019   1/4/2018     Please contact my practice at [phone] if you have any questions or concerns.     Sincerely,        Missy Ford MD      What do your labs mean?  Below is a glossary of commonly ordered labs:  LDL   Bad Cholesterol   HDL   Good Cholesterol  AST/ALT   Liver Function   Cr/Creatinine   Kidney Function  Microalbumin   Kidney Function  BUN   Kidney Function  PSA   Prostate    TSH   Thyroid Hormone  HgbA1c   Diabetes Test   Hgb (Hemoglobin)   Red Blood Cells

## 2022-02-15 NOTE — TELEPHONE ENCOUNTER
"---------------------  From: Viktoria Farr LPN (Phone Messages Pool ([id number]_[facility]))   Sent: 12/27/2019 3:46:14 PM CST  Subject: OCP refill     Phone Message    PCP:   FILI      Time of Call:  3:24pm       Person Calling:  pt  Phone number:  [phone]    Returned call at: 3:45pm    Note:   Pt LM stating she is \"free and clear\" to have her OCP renewed for 1 year. Pt would like [facility] to send this in.    Returned call and informed pt OCP was sent in for 1 year on 10/31/19 to [facility]. Pt says it was suppose to be sent to [facility]. Rx resent.    Last office visit and reason:  12/6/19 cervical mass with MJP  "

## 2022-02-15 NOTE — LETTER
(Inserted Image. Unable to display) January 08, 2020Re: SOFIA BOBDOB:  1995[address] [place] [address]Pl: Dr. PachecoThe following patient has been referred to your office/practice:   SOFIA BOB Appointment : 01/14/2020Location : [facility] refer to the attached clinical documentation for a summary of SOFIA's care.  Please do not hesitate to contact our office if any additional clinical questions arise. All relevant records and transition of care documents should be mailed or faxed.Your assistance in providing continuity of care is appreciatedSincerely, [facility] & [address] [place] [address](P) [phone](F) [phone]

## 2022-02-15 NOTE — PROGRESS NOTES
Patient:   SOFIA BOB            MRN: [id number]            FIN: [id number]               Age:   22 years     Sex:  Female     :  1995   Associated Diagnoses:   Acute maxillary sinusitis   Author:   Luis Enrique Granger PA-C      Chief Complaint   2017 11:23 AM CDT    c/o ongoing cough [id number], SOB, cough productive.   just came down w/ cold sx.   also c/o stomach issues, constant nausea.        History of Present Illness   Chief complaint and symptoms noted above and confirmed with patient   2 month hx of productive cough  has hx of mild intermittent asthma, has used her albuterol inhaler rarely  using some OTC allergy meds, dayquil      Review of Systems   Ear/Nose/Mouth/Throat:  sinus pain, sinus pressure.    Respiratory:  Shortness of breath, Cough, Sputum production.    Gastrointestinal:  Nausea, No vomiting.       Health Status   Allergies:    Allergic Reactions (Selected)  Severity Not Documented  Pollen (No reactions were documented)   Medications:  (Selected)   Prescriptions  Prescribed  Mononessa 0.25 mg-35 mcg oral tablet: 1 tab(s), po, daily, # 90 tab(s), 3 Refill(s), Type: Maintenance, Pharmacy: [facility], Due for visit before any more refills, 1 tab(s) po daily  SUMAtriptan 25 mg oral tablet: See Instructions, Instructions: 2 tab(s) once  may repeat dose in 2 hours if needed, # 9 tab(s), 1 Refill(s), Type: Soft Stop, Pharmacy: [facility], 2 tab(s) once; may repeat dose in 2 hours if needed  Ventolin HFA 90 mcg/inh inhalation aerosol: 2 puff(s) ( 180 mcg ), INH, QID, PRN: for shortness of breath or wheezing, # 18 gm, 5 Refill(s), Type: Maintenance, Pharmacy: [facility], 2 puff(s) inh qid,PRN:for shortness of breath or wheezing  omeprazole 20 mg oral delayed release capsule: 1 cap(s) ( 20 mg ), PO, Daily, # 30 cap(s), 0 Refill(s), Type: Maintenance, Pharmacy: [id number], 1 cap(s) po daily,x30 day(s)  Documented  Medications  Documented  propranolol: ( 60 mg ), 0 Refill(s), Type: Maintenance   Problem list:    All Problems  Dysmenorrhea in adolescent / SNOMED CT [id number] / Confirmed      Histories   Past Medical History:    No active or resolved past medical history items have been selected or recorded.   Family History:    No family history items have been selected or recorded.   Procedure history:    No active procedure history items have been selected or recorded.      Physical Examination   Vital Signs   9/5/2017 11:23 AM CDT Temperature Tympanic 98.8 DegF    Peripheral Pulse Rate 108 bpm  HI    Pulse Site Radial artery    HR Method Manual    Systolic Blood Pressure 120 mmHg    Diastolic Blood Pressure 70 mmHg    Mean Arterial Pressure 87 mmHg    BP Site Right arm    BP Method Manual    Oxygen Saturation 99 %      Measurements from flowsheet : Measurements   9/5/2017 11:23 AM CDT Height Measured - Standard 61.5 in    Weight Measured - Standard 115.4 lb    BSA 1.51 m2    Body Mass Index 21.45 kg/m2      General:  No acute distress.    HENT:  Tympanic membranes are clear, No pharyngeal erythema, maxillary and frontal sinus tenderness   , nares are patent, but nasal turbinates are inflamed and narrowed.    Neck:  Supple, mild anterior cervical lymphadenopathy, slight tenderness.    Respiratory:  lungs have coarse ronchi bilaterally, no wheezes, no rales.    Cardiovascular:  Normal rate, Regular rhythm, No murmur.       Impression and Plan   Diagnosis     Acute maxillary sinusitis (WFA69-AQ J01.00).     Summary:  will treat with augmentin and atrovent nasal spray, continue with expectorant and decongestants prn, heat over sinuses, follow up if not improving.    Orders     Orders   Pharmacy:  Atrovent 42 mcg/inh nasal spray (Prescribe): 2 spray(s), nasal, qid, PRN: for nasal congestion, # 1 EA, 2 Refill(s), Type: Maintenance, Pharmacy: [id number], 2 spray(s) nasal qid,PRN:for nasal congestion  Augmentin 875  mg oral tablet (Prescribe): 1 tab(s), PO, q12hr, x 10 day(s), Instructions: with food or milk, # 20 tab(s), 0 Refill(s), Type: Maintenance, Pharmacy: [id number], 1 tab(s) po q12 hrs,x10 day(s),Instr:with food or milk.     Orders   Charges (Evaluation and Management):  [id number] office outpatient visit 15 minutes (Charge) (Order): Quantity: 1, Acute maxillary sinusitis.

## 2022-02-15 NOTE — NURSING NOTE
Depression Screening Entered On:  12/1/2020 6:35 PM CST    Performed On:  12/1/2020 6:34 PM CST by Blanca Damian CMA               Depression Screening   Little Interest - Pleasure in Activities :   Not at all   Feeling Down, Depressed, Hopeless :   Not at all   Initial Depression Screen Score :   0 Score   Poor Appetite or Overeating :   Not at all   Trouble Falling or Staying Asleep :   Not at all   Feeling Tired or Little Energy :   Not at all   Feeling Bad About Yourself :   Not at all   Trouble Concentrating :   Not at all   Moving or Speaking Slowly :   Not at all   Thoughts Better Off Dead or Hurting Self :   Not at all   Detailed Depression Screen Score :   0    Total Depression Screen Score :   0    Blanca Damian CMA - 12/1/2020 6:34 PM CST

## 2022-02-15 NOTE — NURSING NOTE
Comprehensive Intake Entered On:  12/6/2019 2:17 PM CST    Performed On:  12/6/2019 2:13 PM CST by Carol Cagle CMA               Summary   Chief Complaint :   patient here today for possible colposcopy    Menstrual Status :   Menarcheal   Weight Measured :   127.2 lb(Converted to: 127 lb 3 oz, 57.70 kg)    Height Measured :   62 in(Converted to: 5 ft 2 in, 157.48 cm)    Body Mass Index :   23.26 kg/m2   Body Surface Area :   1.59 m2   Systolic Blood Pressure :   120 mmHg   Diastolic Blood Pressure :   64 mmHg   Mean Arterial Pressure :   83 mmHg   Peripheral Pulse Rate :   104 bpm (HI)    BP Site :   Right arm   BP Method :   Manual   HR Method :   Electronic   Temperature Tympanic :   98.6 DegF(Converted to: 37.0 DegC)    Oxygen Saturation :   98 %   Carol Cagle CMA - 12/6/2019 2:13 PM CST   Health Status   Allergies Verified? :   Yes   Medication History Verified? :   Yes   Pre-Visit Planning Status :   Completed   Tobacco Use? :   Never smoker   Carol Cagle CMA - 12/6/2019 2:13 PM CST   Consents   Consent for Immunization Exchange :   Consent Granted   Consent for Immunizations to Providers :   Consent Granted   Carol Cagle CMA - 12/6/2019 2:13 PM CST   Problems   (As Of: 12/6/2019 2:17:15 PM CST)   Problems(Active)    Asthma (SNOMED CT  :154952303 )  Name of Problem:   Asthma ; Recorder:   Christie De Dios; Confirmation:   Confirmed ; Classification:   Medical ; Code:   [id number] ; Contributor System:   PowerChart ; Last Updated:   1/4/2018 8:45 AM CST ; Life Cycle Date:   1/4/2018 ; Life Cycle Status:   Active ; Vocabulary:   SNOMED CT        Cervical polyp (SNOMED CT  :716776515 )  Name of Problem:   Cervical polyp ; Recorder:   Missy Ford MD; Confirmation:   Confirmed ; Classification:   Medical ; Code:   [id number] ; Contributor System:   PowerChart ; Last Updated:   10/31/2019 4:28 PM CDT ; Life Cycle Status:   Active ; Responsible Provider:   Missy Ford MD; Vocabulary:   SNOMED  CT        Dysmenorrhea (SNOMED CT  :507963103 )  Name of Problem:   Dysmenorrhea ; Recorder:   Christie De Dios; Confirmation:   Confirmed ; Classification:   Medical ; Code:   [id number] ; Contributor System:   PowerChart ; Last Updated:   1/4/2018 8:43 AM CST ; Life Cycle Date:   1/4/2018 ; Life Cycle Status:   Active ; Vocabulary:   SNOMED CT        Dysmenorrhea in adolescent (SNOMED CT  :070638971 )  Name of Problem:   Dysmenorrhea in adolescent ; Recorder:   Elaine Pretty; Confirmation:   Confirmed ; Classification:   Medical ; Code:   [id number] ; Contributor System:   PowerChart ; Last Updated:   4/2/2015 3:30 PM CDT ; Life Cycle Status:   Active ; Responsible Provider:   Elaine Pretty; Vocabulary:   SNOMED CT        Headache (SNOMED CT  :80349258 )  Name of Problem:   Headache ; Recorder:   Christie De Dios; Confirmation:   Confirmed ; Classification:   Medical ; Code:   [id number] ; Contributor System:   PowerChart ; Last Updated:   1/4/2018 8:44 AM CST ; Life Cycle Date:   1/4/2018 ; Life Cycle Status:   Active ; Vocabulary:   SNOMED CT        Long-term use of immunosuppressant medication (SNOMED CT  :4195652231 )  Name of Problem:   Long-term use of immunosuppressant medication ; Recorder:   Missy Ford MD; Confirmation:   Confirmed ; Classification:   Medical ; Code:   [id number] ; Contributor System:   PowerChart ; Last Updated:   10/31/2019 11:26 AM CDT ; Life Cycle Status:   Active ; Responsible Provider:   Missy Ford MD; Vocabulary:   SNOMED CT        Multiple sclerosis (SNOMED CT  :81878967 )  Name of Problem:   Multiple sclerosis ; Recorder:   Missy Ford MD; Confirmation:   Confirmed ; Classification:   Medical ; Code:   15427618 ; Contributor System:   PowerChart ; Last Updated:   10/31/2019 11:26 AM CDT ; Life Cycle Status:   Active ; Responsible Provider:   Missy Ford MD; Vocabulary:   SNOMED CT          Meds / Allergies   (As Of: 12/6/2019 2:17:15  PM CST)   Allergies (Active)   Pollen  Estimated Onset Date:   Unspecified ; Created By:   Candis Galdamez; Reaction Status:   Active ; Category:   Drug ; Substance:   Pollen ; Type:   Allergy ; Updated By:   Candis Galdamez; Reviewed Date:   10/31/2019 10:37 AM CDT        Medication List   (As Of: 12/6/2019 2:17:15 PM CST)   Prescription/Discharge Order    albuterol  :   albuterol ; Status:   Prescribed ; Ordered As Mnemonic:   Ventolin HFA 90 mcg/inh inhalation aerosol ; Simple Display Line:   180 mcg, 2 puff(s), INH, QID, PRN: for shortness of breath or wheezing, 18 gm, 5 Refill(s) ; Ordering Provider:   Dianne Esquivel; Catalog Code:   albuterol ; Order Dt/Tm:   11/28/2016 12:21:08 PM CST          ethinyl estradiol-norgestimate  :   ethinyl estradiol-norgestimate ; Status:   Prescribed ; Ordered As Mnemonic:   Estarylla 0.25 mg-35 mcg oral tablet ; Simple Display Line:   1 tab(s), Oral, daily, 84 tab(s), 3 Refill(s) ; Ordering Provider:   Missy Ford MD; Catalog Code:   ethinyl estradiol-norgestimate ; Order Dt/Tm:   10/31/2019 11:27:25 AM CDT          Miscellaneous Rx Supply  :   Miscellaneous Rx Supply ; Status:   Prescribed ; Ordered As Mnemonic:   Rollator ; Simple Display Line:   See Instructions, use as directed, 1 EA, 0 Refill(s) ; Ordering Provider:   Missy Ford MD; Catalog Code:   Miscellaneous Rx Supply ; Order Dt/Tm:   6/24/2019 7:20:04 PM CDT          Miscellaneous Rx Supply  :   Miscellaneous Rx Supply ; Status:   Prescribed ; Ordered As Mnemonic:   Walker ; Simple Display Line:   See Instructions, use as directed, 1 EA, 0 Refill(s) ; Ordering Provider:   Missy Ford MD; Catalog Code:   Miscellaneous Rx Supply ; Order Dt/Tm:   6/24/2019 7:20:04 PM CDT          Miscellaneous Rx Supply  :   Miscellaneous Rx Supply ; Status:   Prescribed ; Ordered As Mnemonic:   Wheelchair ; Simple Display Line:   See Instructions, use as directed, 1 EA, 0 Refill(s) ; Ordering  Provider:   Missy Ford MD; Catalog Code:   Miscellaneous Rx Supply ; Order Dt/Tm:   6/24/2019 7:20:04 PM CDT          SUMAtriptan  :   SUMAtriptan ; Status:   Prescribed ; Ordered As Mnemonic:   SUMAtriptan 25 mg oral tablet ; Simple Display Line:   See Instructions, 2 tab(s) once  may repeat dose in 2 hours if needed, 9 tab(s), 1 Refill(s) ; Ordering Provider:   Christie De Dios; Catalog Code:   SUMAtriptan ; Order Dt/Tm:   1/4/2018 10:23:56 AM CST            Home Meds    dimethyl fumarate  :   dimethyl fumarate ; Status:   Documented ; Ordered As Mnemonic:   Tecfidera 240 mg oral delayed release capsule ; Simple Display Line:   240 mg, 1 cap(s), Oral, bid, 0 Refill(s) ; Catalog Code:   dimethyl fumarate ; Order Dt/Tm:   12/6/2019 2:15:57 PM CST          propranolol  :   propranolol ; Status:   Documented ; Ordered As Mnemonic:   propranolol ; Simple Display Line:   60 mg ; Catalog Code:   propranolol ; Order Dt/Tm:   6/30/2015 11:23:41 AM CDT

## 2022-02-15 NOTE — NURSING NOTE
Comprehensive Intake Entered On:  5/3/2021 10:26 AM CDT    Performed On:  5/3/2021 10:19 AM CDT by Shabnam Pierce CMA               Summary   Chief Complaint :   c/o left ear pain this morning     Menstrual Status :   Menarcheal   Weight Measured :   125.9 lb(Converted to: 125 lb 14 oz, 57.107 kg)    Height Measured :   62 in(Converted to: 5 ft 2 in, 157.48 cm)    Body Mass Index :   23.02 kg/m2   Body Surface Area :   1.58 m2   Height/Length Estimated :   62 in(Converted to: 5 ft 2 in, 157.48 cm)    Systolic Blood Pressure :   120 mmHg   Diastolic Blood Pressure :   66 mmHg   Mean Arterial Pressure :   84 mmHg   Peripheral Pulse Rate :   80 bpm   BP Site :   Right arm   Pulse Site :   Radial artery   BP Method :   Manual   HR Method :   Manual   Temperature Tympanic :   97.9 DegF(Converted to: 36.6 DegC)    Shabnam Pierce CMA - 5/3/2021 10:19 AM CDT   Health Status   Allergies Verified? :   Yes   Medication History Verified? :   Yes   Medical History Verified? :   No   Pre-Visit Planning Status :   Not completed   Tobacco Use? :   Never smoker   Shabnam Pierce CMA - 5/3/2021 10:19 AM CDT   Consents   Consent for Immunization Exchange :   Consent Granted   Consent for Immunizations to Providers :   Consent Granted   Shabnam Pierce CMA - 5/3/2021 10:19 AM CDT   Meds / Allergies   (As Of: 5/3/2021 10:26:31 AM CDT)   Allergies (Active)   Pollen  Estimated Onset Date:   Unspecified ; Created By:   Candis Lr MA; Reaction Status:   Active ; Category:   Drug ; Substance:   Pollen ; Type:   Allergy ; Updated By:   Candis Lr MA; Reviewed Date:   5/3/2021 10:22 AM CDT        Medication List   (As Of: 5/3/2021 10:26:31 AM CDT)   Prescription/Discharge Order    albuterol  :   albuterol ; Status:   Prescribed ; Ordered As Mnemonic:   Albuterol (Eqv-Proventil HFA) 90 mcg/inh inhalation aerosol ; Simple Display Line:   2 puff(s), Inhale, qid, 3 EA, 2 Refill(s) ; Ordering Provider:   Missy Ford MD; Catalog  Code:   albuterol ; Order Dt/Tm:   2/4/2021 4:15:53 PM CST          ethinyl estradiol-norgestimate  :   ethinyl estradiol-norgestimate ; Status:   Prescribed ; Ordered As Mnemonic:   Sprintec 0.25 mg-35 mcg oral tablet ; Simple Display Line:   1 tab(s), Oral, daily, take 21 days of active tablets then skip placebos and start the next pack, 112 tab(s), 3 Refill(s) ; Ordering Provider:   Missy Ford MD; Catalog Code:   ethinyl estradiol-norgestimate ; Order Dt/Tm:   12/17/2020 5:55:45 PM CST          Miscellaneous Prescription  :   Miscellaneous Prescription ; Status:   Prescribed ; Ordered As Mnemonic:   valved holding chamber spacer ; Simple Display Line:   See Instructions, use with albuterol, 1 EA, 0 Refill(s) ; Ordering Provider:   Missy Ford MD; Catalog Code:   Miscellaneous Prescription ; Order Dt/Tm:   12/17/2020 5:53:31 PM CST          Miscellaneous Rx Supply  :   Miscellaneous Rx Supply ; Status:   Prescribed ; Ordered As Mnemonic:   Walker ; Simple Display Line:   See Instructions, use as directed, 1 EA, 0 Refill(s) ; Ordering Provider:   Missy Ford MD; Catalog Code:   Miscellaneous Rx Supply ; Order Dt/Tm:   6/24/2019 7:20:04 PM CDT          Miscellaneous Rx Supply  :   Miscellaneous Rx Supply ; Status:   Prescribed ; Ordered As Mnemonic:   Wheelchair ; Simple Display Line:   See Instructions, use as directed, 1 EA, 0 Refill(s) ; Ordering Provider:   Missy Ford MD; Catalog Code:   Miscellaneous Rx Supply ; Order Dt/Tm:   6/24/2019 7:20:04 PM CDT          predniSONE  :   predniSONE ; Status:   Completed ; Ordered As Mnemonic:   predniSONE 50 mg oral tablet ; Simple Display Line:   50 mg, 1 tab(s), PO, Daily, for 5 day(s), 5 tab(s), 0 Refill(s) ; Ordering Provider:   Missy Ford MD; Catalog Code:   predniSONE ; Order Dt/Tm:   12/17/2020 5:42:02 PM CST            Home Meds    dimethyl fumarate  :   dimethyl fumarate ; Status:   Completed ; Ordered As Mnemonic:   Tecfidera 240  mg oral delayed release capsule ; Simple Display Line:   240 mg, 1 cap(s), Oral, bid, 0 Refill(s) ; Catalog Code:   dimethyl fumarate ; Order Dt/Tm:   12/6/2019 2:15:57 PM CST          diroximel fumarate  :   diroximel fumarate ; Status:   Processing ; Ordered As Mnemonic:   Vumerity ; Simple Display Line:   462 mg, Oral, bid, 0 Refill(s) ; Action Display:   Modify ; Catalog Code:   diroximel fumarate ; Order Dt/Tm:   5/3/2021 10:24:25 AM CDT          ethinyl estradiol-norgestimate  :   ethinyl estradiol-norgestimate ; Status:   Completed ; Ordered As Mnemonic:   Estarylla 0.25 mg-35 mcg oral tablet ; Simple Display Line:   0 Refill(s) ; Catalog Code:   ethinyl estradiol-norgestimate ; Order Dt/Tm:   12/17/2020 5:39:38 PM CST ; Comment:   Responsible Provider: KENNY HO          Misc Prescription  :   Misc Prescription ; Status:   Completed ; Ordered As Mnemonic:   Misc Prescription ; Simple Display Line:   0 Refill(s) ; Catalog Code:   Miscellaneous Prescription ; Order Dt/Tm:   12/17/2020 5:39:35 PM CST ; Comment:   Responsible Provider: Natalia Rodney            ID Risk Screen   Recent Travel History :   No recent travel   Family Member Travel History :   No recent travel   Other Exposure to Infectious Disease :   Unknown   COVID-19 Testing Status :   No positive COVID-19 test   Shabnam Pierce CMA 5/3/2021 10:19 AM CDT   Social History   Social History   (As Of: 5/3/2021 10:26:31 AM CDT)   Alcohol:        Current, Liquor (Hard) (1.5 oz), 1-2 times per month, 1 drinks/episode average.  3 drinks/episode maximum.  Drinks more than intended: No.   (Last Updated: 12/1/2020 6:33:58 PM CST by Blanca Damian CMA)          Tobacco:        Never (less than 100 in lifetime)   (Last Updated: 12/1/2020 5:46:50 PM CST by Elizabeth Del Cid)          Electronic Cigarette/Vaping:        Electronic Cigarette Use: Never.   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, studying elementary education. Plans to graduate in Dec 2018.  Plans grad school..   (Last Updated: 1/4/2018 10:50:52 AM CST by Christie De Dios)          Home/Environment:        Marital status: Single.  Family/Friends available for support: Yes.  Risks in environment: owns secured gun.   (Last Updated: 1/4/2018 3:38:04 PM CST by Lissa Mitchell)          Nutrition/Health:        Type of diet: Regular.   (Last Updated: 4/3/2015 9:46:48 AM CDT by Sandra Regalado MA)          Exercise:        Exercise frequency: Never.   (Last Updated: 4/3/2015 9:46:48 AM CDT by Sandra Regalado MA)          Sexual:        Sexually active: Yes.  Identifies as female, Sexual orientation: Straight or heterosexual.  Uses condoms: Yes.  Contraceptive Use Details: Birth control pill.   (Last Updated: 1/4/2018 10:53:53 AM CST by Christie De Dios)

## 2022-02-15 NOTE — NURSING NOTE
Comprehensive Intake Entered On:  4/10/2019 1:18 PM CDT    Performed On:  4/10/2019 1:13 PM CDT by Dina BASSETT, Katelyn               Summary   Chief Complaint :   126c/o left ear pain since Sunday morning.  denies fever, sore throat.  does have head congestion.   Katelyn Arroyo MA - 4/10/2019 1:18 PM CDT     Menstrual Status :   Menarcheal   Height Measured :   62 in(Converted to: 5 ft 2 in, 157.48 cm)    Systolic Blood Pressure :   124 mmHg   Diastolic Blood Pressure :   60 mmHg   Mean Arterial Pressure :   81 mmHg   Peripheral Pulse Rate :   72 bpm   BP Site :   Left arm   Pulse Site :   Radial artery   BP Method :   Manual   HR Method :   Manual   Temperature Tympanic :   98.1 DegF(Converted to: 36.7 DegC)    Katelyn Arroyo MA - 4/10/2019 1:13 PM CDT   Health Status   Allergies Verified? :   Yes   Medication History Verified? :   Yes   Medical History Verified? :   Yes   Pre-Visit Planning Status :   Not completed   Tobacco Use? :   Never smoker   Katelyn Arroyo MA - 4/10/2019 1:13 PM CDT   Consents   Consent for Immunization Exchange :   Consent Granted   Consent for Immunizations to Providers :   Consent Granted   Katelyn Arroyo MA - 4/10/2019 1:13 PM CDT   Meds / Allergies   (As Of: 4/10/2019 1:18:27 PM CDT)   Allergies (Active)   Pollen  Estimated Onset Date:   Unspecified ; Created By:   Candis Galdamez; Reaction Status:   Active ; Category:   Drug ; Substance:   Pollen ; Type:   Allergy ; Updated By:   Candis Galdamez; Reviewed Date:   4/30/2018 5:00 PM CDT        Medication List   (As Of: 4/10/2019 1:18:27 PM CDT)   Prescription/Discharge Order    albuterol  :   albuterol ; Status:   Prescribed ; Ordered As Mnemonic:   Ventolin HFA 90 mcg/inh inhalation aerosol ; Simple Display Line:   180 mcg, 2 puff(s), INH, QID, PRN: for shortness of breath or wheezing, 18 gm, 5 Refill(s) ; Ordering Provider:   Dianne Esquivel; Catalog Code:   albuterol ; Order Dt/Tm:   11/28/2016 12:21:08 PM           amoxicillin-clavulanate  :   amoxicillin-clavulanate ; Status:   Completed ; Ordered As Mnemonic:   Augmentin 875 mg oral tablet ; Simple Display Line:   1 tab(s), PO, q12hr, for 10 day(s), with food or milk, 20 tab(s), 0 Refill(s) ; Ordering Provider:   Elaine Pretty; Catalog Code:   amoxicillin-clavulanate ; Order Dt/Tm:   4/30/2018 5:51:01 PM          Estarylla 0.25 mg-35 mcg oral tablet  :   Estarylla 0.25 mg-35 mcg oral tablet ; Status:   Prescribed ; Ordered As Mnemonic:   Estarylla 0.25 mg-35 mcg oral tablet ; Simple Display Line:   1 tab(s), Oral, daily, 84 tab(s), 3 Refill(s) ; Ordering Provider:   Christie De Dios; Catalog Code:   ethinyl estradiol-norgestimate ; Order Dt/Tm:   12/11/2018 9:28:00 AM          predniSONE  :   predniSONE ; Status:   Completed ; Ordered As Mnemonic:   predniSONE 10 mg oral tablet ; Simple Display Line:   30 mg, 3 tab(s), PO, Daily, for 7 day(s), 21 tab(s), 0 Refill(s) ; Ordering Provider:   Elaine Pretty; Catalog Code:   predniSONE ; Order Dt/Tm:   4/30/2018 5:52:29 PM          SUMAtriptan  :   SUMAtriptan ; Status:   Prescribed ; Ordered As Mnemonic:   SUMAtriptan 25 mg oral tablet ; Simple Display Line:   See Instructions, 2 tab(s) once  may repeat dose in 2 hours if needed, 9 tab(s), 1 Refill(s) ; Ordering Provider:   Christie De Dios; Catalog Code:   SUMAtriptan ; Order Dt/Tm:   1/4/2018 10:23:56 AM            Home Meds    propranolol  :   propranolol ; Status:   Documented ; Ordered As Mnemonic:   propranolol ; Simple Display Line:   60 mg ; Catalog Code:   propranolol ; Order Dt/Tm:   6/30/2015 11:23:41 AM            Social History   Social History   (As Of: 4/10/2019 1:18:27 PM CDT)   Alcohol:        Current, 1-2 times per month, 1 drinks/episode average.  2 drinks/episode maximum.  Drinks more than intended: No.   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, studying elementary education. Plans to graduate in Dec 2018.  Plans grad school..   (Last Updated: 1/4/2018 10:50:52 AM CST by Christie De Dios)          Home and Environment:        Marital status: Single.  Family/Friends available for support: Yes.  Risks in environment: owns secured gun.   (Last Updated: 1/4/2018 3:38:04 PM CST by Lissa Mitchell)          Nutrition and Health:        Type of diet: Regular.   (Last Updated: 4/3/2015 9:46:48 AM CDT by Sandra Regalado MA)          Exercise and Physical Activity:        Exercise frequency: Never.   (Last Updated: 4/3/2015 9:46:48 AM CDT by Sandra Regalado MA)          Sexual:        Sexually active: Yes.  Identifies as female, Sexual orientation: Straight or heterosexual.  Uses condoms: Yes.  Contraceptive Use Details: Birth control pill.   (Last Updated: 1/4/2018 10:53:53 AM CST by Christie De Dios)

## 2022-02-22 ENCOUNTER — MEDICAL CORRESPONDENCE (OUTPATIENT)
Dept: HEALTH INFORMATION MANAGEMENT | Facility: CLINIC | Age: 27
End: 2022-02-22
Payer: COMMERCIAL

## 2022-03-02 NOTE — LETTER
(Inserted Image. Unable to display)            January 04, 2022        SOFIA BOB  [address] [place] [address]        Dear SOFIA,     Thank you for selecting [facility] for your healthcare needs. Below you will find the results of your recent test(s) done at our clinic.      Your pap smear cells look normal and you do not have gonorrhea or chlamydia.  You  should have your next pap smear in 1-3 years.        Result Name Current Result   ThinPrep PAP with HPV   12/28/2021     Please contact my practice at [phone] if you have any questions or concerns.     Sincerely,        Missy Ford MD      What do your labs mean?  Below is a glossary of commonly ordered labs:  LDL   Bad Cholesterol   HDL   Good Cholesterol  AST/ALT   Liver Function   Cr/Creatinine   Kidney Function  Microalbumin   Kidney Function  BUN   Kidney Function  PSA   Prostate    TSH   Thyroid Hormone  HgbA1c   Diabetes Test   Hgb (Hemoglobin)   Red Blood Cells

## 2022-03-02 NOTE — TELEPHONE ENCOUNTER
Entered by Argentina Street RN on January 24, 2022 4:17:15 PM CST  ---------------------  From: Argentina Street RN   To: [employer] [facility] #[id number]    Sent: 1/24/2022 4:17:15 PM CST  Subject: Medication Management     ** Submitted: **  Order:ethinyl estradiol-norgestimate (Estarylla 0.25 mg-35 mcg oral tablet)  See Instructions  TAKE 1 ACTIVE TABLET DAILY SKIP PLACEBO TABLETS AND START NEXT PACK AS DIRECTED  Qty:  28 tab(s)        Days Supply:  21        Refills:  11          Substitutions Allowed     [id number]    Signed by Argentina Street RN  1/24/2022 10:16:00 PM [facility]    ** Submitted: **  Complete:ethinyl estradiol-norgestimate (Estarylla 0.25 mg-35 mcg oral tablet)   Signed by Argentina Street RN  1/24/2022 10:17:00 PM [facility]    ** Not Approved:  **  ethinyl estradiol-norgestimate (ESTARYLLA TABLETS 28S)  TAKE 1 ACTIVE TABLET DAILY SKIP PLACEBO TABLETS AND START NEXT PACK AS DIRECTED  Qty:  28 tab(s)        Days Supply:  21        Refills:  0          Substitutions Allowed     [facility][id number]   Signed by Argentina Street RN            ------------------------------------------  [id number]  To: Missy Ford MD  Sent: January 15, 2022 3:44:06 AM CST  Subject: Medication Management  Due: December 15, 2021 8:20:29 PM CST     ** On Hold Pending Signature **     Dispensed Drug: ethinyl estradiol-norgestimate (Estarylla 0.25 mg-35 mcg oral tablet), TAKE 1 ACTIVE TABLET DAILY SKIP PLACEBO TABLETS AND START NEXT PACK AS DIRECTED  Quantity: 28 tab(s)  Days Supply: 21  Refills: 0  Substitutions Allowed  Notes from Pharmacy:  ------------------------------------------Medication Refill    PCP:  FILI    Name of med requested:  Sona    Date of last office visit and reason:  12/28/21 Px FILI  Date of last Med Check / Px:   12/28/21    Date of last labs pertaining to med:      RTC order in chart:      For Protocol refill, has patient been  contacted:

## 2022-03-02 NOTE — PROCEDURES
Accession Number:       [id number]  CLINICAL INFORMATION::     None given  LMP::     NONE GIVEN  PREV. PAP::     NONE GIVEN  PREV. BX::     NONE GIVEN  SOURCE::     None given  STATEMENT OF ADEQUACY::     Satisfactory for evaluation. Endocervical/transformation zone component present. Age and/or menstrual status not provided  INTERPRETATION/RESULT::     Negative for intraepithelial lesion or malignancy.  COMMENT::     This Pap test has been evaluated with computer assisted technology.  CYTOTECHNOLOGIST::     NINO ZIMMERMAN(ASCP) CT Screening location: [place] [id number]  COMMENT:     See comment       EXPLANATORY NOTE:         The Pap is a screening test for cervical cancer. It is       not a diagnostic test and is subject to false negative       and false positive results. It is most reliable when a       satisfactory sample, regularly obtained, is submitted       with relevant clinical findings and history, and when       the Pap result is evaluated along with historic and       current clinical information.  CHLAMYDIA TRACHOMATIS RNA, TMA, UROGENITAL:     NOT DETECTED  NEISSERIA GONORRHOEAE RNA, TMA, UROGENITAL:     NOT DETECTED  COMMENT:     See comment       The analytical performance characteristics of this       assay, when used to test SurePath(TM) specimens have been       determined by [facility]. The modifications have       not been cleared or approved by the FDA. This assay has       been validated pursuant to the CLIA regulations and is       used for clinical purposes.         For additional information, please refer to       [email or web address]       (This link is being provided for information/       educational purposes only.)

## 2022-05-06 ENCOUNTER — TELEPHONE (OUTPATIENT)
Dept: NEUROLOGY | Facility: CLINIC | Age: 27
End: 2022-05-06
Payer: COMMERCIAL

## 2022-05-06 DIAGNOSIS — G35 MULTIPLE SCLEROSIS (H): ICD-10-CM

## 2022-05-06 NOTE — TELEPHONE ENCOUNTER
Prior Authorization Specialty Medication Request    Medication/Dose: Vumerity 462 mg BID  ICD code (if different than what is on RX):  Multiple Sclerosis, G35  Previously Tried and Failed:      Important Lab Values:   Rationale: Continuation of disease-modifying therapy for demyelinating disease. Pt is on Vumerity and has new insurance.    Insurance Name: [employer]  Insurance ID: [id number]  Insurance Phone Number: [phone]    Pharmacy Information (if different than what is on RX)  Name:    Phone:

## 2022-05-09 NOTE — TELEPHONE ENCOUNTER
PA Initiation    Medication: Vumerity- PA Initiated  Insurance Company: [employer] - Phone [phone] Fax [phone]  Pharmacy Filling the Rx: [facility] [place] - [facility]  Filling Pharmacy Phone:    Filling Pharmacy Fax:    Start Date: 5/9/2022      Per test claim no PA Needed, showing high OOP, patient is working on getting copay card from [employer] and she is calling today to see if they have taken care of switching to copay card and she is going to see if they can overnight to her since she is nearly out of medication.

## 2022-05-09 NOTE — TELEPHONE ENCOUNTER
Spoke with pt. She has set up copay assistance with [employer], but plans to call them again this evening regarding pharmacy set-up. She is hoping to continue filling with [facility] if they can fill using her insurance (was using [facility] when on PAP). Informed pt that per our prior auth team, we can send prescription to [facility]. Pt would like to make phone call to [employer] first.    Romelia Kapoor RN

## 2022-05-11 RX ORDER — DIROXIMEL FUMARATE 231 MG/1
462 CAPSULE ORAL 2 TIMES DAILY
Qty: 360 CAPSULE | Refills: 3 | Status: SHIPPED | OUTPATIENT
Start: 2022-05-11 | End: 2023-05-02

## 2022-05-11 NOTE — TELEPHONE ENCOUNTER
Spoke with Janneth. She requests that we send Rx to [facility]. Pt was last seen in March 2021 and has follow up in May 2022 with Dr Dee. Prescription sent per MS refill protocol.     Romelia Kapoor RN

## 2022-05-11 NOTE — TELEPHONE ENCOUNTER
[facility] Call Center    Phone Message    May a detailed message be left on voicemail: yes     Reason for Call: Other: Pt claling in to check on the status of her Rx, pt informed she does not need a PA for this med and [facility] is waiting for the order, please call back to discuss further      Action Taken: Message routed to:  [facility] ([facility] neurology    Travel Screening: Not Applicable

## 2022-05-31 ENCOUNTER — LAB (OUTPATIENT)
Dept: LAB | Facility: CLINIC | Age: 27
End: 2022-05-31
Attending: PSYCHIATRY & NEUROLOGY
Payer: COMMERCIAL

## 2022-05-31 ENCOUNTER — OFFICE VISIT (OUTPATIENT)
Dept: NEUROLOGY | Facility: CLINIC | Age: 27
End: 2022-05-31
Attending: PSYCHIATRY & NEUROLOGY
Payer: COMMERCIAL

## 2022-05-31 VITALS
BODY MASS INDEX: 22.17 KG/M2 | HEART RATE: 80 BPM | OXYGEN SATURATION: 100 % | SYSTOLIC BLOOD PRESSURE: 113 MMHG | WEIGHT: 123.2 LBS | DIASTOLIC BLOOD PRESSURE: 82 MMHG

## 2022-05-31 DIAGNOSIS — G35 MS (MULTIPLE SCLEROSIS) (H): Primary | ICD-10-CM

## 2022-05-31 DIAGNOSIS — G35 MS (MULTIPLE SCLEROSIS) (H): ICD-10-CM

## 2022-05-31 LAB
ALT SERPL W P-5'-P-CCNC: 17 U/L (ref 0–50)
AST SERPL W P-5'-P-CCNC: 14 U/L (ref 0–45)
BASOPHILS # BLD AUTO: 0 10E3/UL (ref 0–0.2)
BASOPHILS NFR BLD AUTO: 1 %
EOSINOPHIL # BLD AUTO: 0.1 10E3/UL (ref 0–0.7)
EOSINOPHIL NFR BLD AUTO: 1 %
ERYTHROCYTE [DISTWIDTH] IN BLOOD BY AUTOMATED COUNT: 12.7 % (ref 10–15)
HCT VFR BLD AUTO: 46.1 % (ref 35–47)
HGB BLD-MCNC: 15 G/DL (ref 11.7–15.7)
IMM GRANULOCYTES # BLD: 0 10E3/UL
IMM GRANULOCYTES NFR BLD: 0 %
LYMPHOCYTES # BLD AUTO: 1.6 10E3/UL (ref 0.8–5.3)
LYMPHOCYTES NFR BLD AUTO: 29 %
MCH RBC QN AUTO: 28.1 PG (ref 26.5–33)
MCHC RBC AUTO-ENTMCNC: 32.5 G/DL (ref 31.5–36.5)
MCV RBC AUTO: 87 FL (ref 78–100)
MONOCYTES # BLD AUTO: 0.4 10E3/UL (ref 0–1.3)
MONOCYTES NFR BLD AUTO: 7 %
NEUTROPHILS # BLD AUTO: 3.3 10E3/UL (ref 1.6–8.3)
NEUTROPHILS NFR BLD AUTO: 62 %
NRBC # BLD AUTO: 0 10E3/UL
NRBC BLD AUTO-RTO: 0 /100
PLATELET # BLD AUTO: 247 10E3/UL (ref 150–450)
RBC # BLD AUTO: 5.33 10E6/UL (ref 3.8–5.2)
WBC # BLD AUTO: 5.4 10E3/UL (ref 4–11)

## 2022-05-31 PROCEDURE — 36415 COLL VENOUS BLD VENIPUNCTURE: CPT | Performed by: PATHOLOGY

## 2022-05-31 PROCEDURE — 85025 COMPLETE CBC W/AUTO DIFF WBC: CPT | Performed by: PATHOLOGY

## 2022-05-31 PROCEDURE — 84460 ALANINE AMINO (ALT) (SGPT): CPT | Performed by: PATHOLOGY

## 2022-05-31 PROCEDURE — G0463 HOSPITAL OUTPT CLINIC VISIT: HCPCS

## 2022-05-31 PROCEDURE — 84450 TRANSFERASE (AST) (SGOT): CPT | Performed by: PATHOLOGY

## 2022-05-31 PROCEDURE — 99215 OFFICE O/P EST HI 40 MIN: CPT | Performed by: PSYCHIATRY & NEUROLOGY

## 2022-05-31 ASSESSMENT — PAIN SCALES - GENERAL: PAINLEVEL: NO PAIN (0)

## 2022-05-31 NOTE — PROGRESS NOTES
"Service Date: 05/31/2022    REASON FOR VISIT:  Janneth Hirsch (previously Naveed) is a 26-year-old woman whom I follow for multiple sclerosis.  She returns for routine followup.  She was last seen in clinic in 03/2021 by Natalia Rodney.    HISTORY OF PRESENT ILLNESS:  Janneth got  last summer.  She has been stable from a neurologic perspective with no symptoms suggestive of interim relapse or of MS progression.  She mentions that she has had an intermittent squeezing chest pain ever since her teenage years and wonders if that could be related to MS.  I told her about the \"MS hug\" and how that could be an atypical presentation of that, but it certainly is probably more likely not related to MS.      She is taking Vumerity and tolerates that fine.  She does have chronic loose stools, but that predates MS.  She has remained COVID-free and has been vaccinated.  She has not had any significant non-neurologic medical issues since I saw her last.    PHYSICAL EXAMINATION:  Blood pressure 113/82, pulse 80, weight 123 pounds.  She is alert and oriented.  Affect is bright and language functions are normal.  Cranial nerves are unremarkable.  Muscle bulk, tone, strength and dexterity are normal in the arms and legs.  Light touch is intact in all 4 limbs.  Deep tendon reflexes are normal and symmetric and her gait is narrow-based and stable.    We reviewed together her MRI of the brain done last fall and compared it to the prior exam from 2020.  It is largely stable, although 1 subcortical lesion in the left frontal white matter does appear slightly larger, perhaps due to differences in technique.    IMPRESSION:  Relapsing-remitting multiple sclerosis, stable on Vumerity.    I spent 41 minutes on her care today including chart review, face-to-face and documentation time.      She and her  are not planning on trying to have children until next summer, but we did address the topic today.  I told her the Vumerity, like most " MS medicines, is not known to be safe during pregnancy.  One option would be to switch to glatiramer acetate, which is safe during pregnancy.  Another option would be to switch to B-cell depletion, which is fairly easy to navigate pregnancies around due to the infrequent infusions.  A third option would be to stop the Vumerity for a month or so before she wanted to start trying to conceive and stay off treatment altogether until she is done breastfeeding.  We will discuss this further at future appointments.    PLAN:    1.  CBC, AST, ALT today.  2.  Follow up in 1 year with repeat brain MRI at that time.    Lewis Dee MD        D: 2022   T: 2022   MT: JOANNE    Name:     SOFIA BRANNONBarbara  MRN:      [id number]        Account:      [id number]   :      1995           Service Date: 2022       Document: [id number]

## 2022-05-31 NOTE — LETTER
"5/31/2022       RE: Janneth Hirsch  [address]  [place] [address]     Dear Colleague,    Thank you for referring your patient, Janneth Hirsch, to the [facility]. Please see a copy of my visit note below.    Service Date: 05/31/2022    REASON FOR VISIT:  Janneth Hirsch (previously Naveed) is a 26-year-old woman whom I follow for multiple sclerosis.  She returns for routine followup.  She was last seen in clinic in 03/2021 by Natalia Rodney.    HISTORY OF PRESENT ILLNESS:  Janneth got  last summer.  She has been stable from a neurologic perspective with no symptoms suggestive of interim relapse or of MS progression.  She mentions that she has had an intermittent squeezing chest pain ever since her teenage years and wonders if that could be related to MS.  I told her about the \"MS hug\" and how that could be an atypical presentation of that, but it certainly is probably more likely not related to MS.      She is taking Vumerity and tolerates that fine.  She does have chronic loose stools, but that predates MS.  She has remained COVID-free and has been vaccinated.  She has not had any significant non-neurologic medical issues since I saw her last.    PHYSICAL EXAMINATION:  Blood pressure 113/82, pulse 80, weight 123 pounds.  She is alert and oriented.  Affect is bright and language functions are normal.  Cranial nerves are unremarkable.  Muscle bulk, tone, strength and dexterity are normal in the arms and legs.  Light touch is intact in all 4 limbs.  Deep tendon reflexes are normal and symmetric and her gait is narrow-based and stable.    We reviewed together her MRI of the brain done last fall and compared it to the prior exam from 2020.  It is largely stable, although 1 subcortical lesion in the left frontal white matter does appear slightly larger, perhaps due to differences in " technique.    IMPRESSION:  Relapsing-remitting multiple sclerosis, stable on Vumerity.    I spent 41 minutes on her care today including chart review, face-to-face and documentation time.      She and her  are not planning on trying to have children until next summer, but we did address the topic today.  I told her the Vumerity, like most MS medicines, is not known to be safe during pregnancy.  One option would be to switch to glatiramer acetate, which is safe during pregnancy.  Another option would be to switch to B-cell depletion, which is fairly easy to navigate pregnancies around due to the infrequent infusions.  A third option would be to stop the Vumerity for a month or so before she wanted to start trying to conceive and stay off treatment altogether until she is done breastfeeding.  We will discuss this further at future appointments.    PLAN:    1.  CBC, AST, ALT today.  2.  Follow up in 1 year with repeat brain MRI at that time.      Lewis Dee MD        D: 2022   T: 2022   MT: JOANNE    Name:     SOFIA BRANNONBarbara  MRN:      [id number]        Account:      [id number]   :      1995           Service Date: 2022       Document: [id number]

## 2022-06-16 NOTE — PROCEDURES
Accession Number:       [id number]  CLINICAL INFORMATION:     Endocervical mass  PATHOLOGIST:     Shanna Haynes M.D., Board Certified in Anatomic Pathology and Clinical Pathology 1  (electronic signature)  REPORT NOTES:     See comment       Key portions of this case have been reviewed by        one or more pathologists.         This test (p16) was developed and its analytical       performance characteristics have been determined       by [facility]. It has       not been cleared or approved by the U.[place] Food and       Drug Administration. This assay has been       validated pursuant to the CLIA regulations and is       used for clinical purposes.  A SOURCE:     Endocervix  A GROSS DESCRIPTION:     See comment       Specimen is received in formalin, labeled with       multiple patient identifier(s) and consists of       one piece(s) of tissue measuring 0.35 x 0.3 x 0.2       cm, irregular in shape and yellow-white in color.       The margins are inked green. The specimen is       entirely submitted in one cassette(s).           Gross exam(s) performed at: [address] [address]         : VALERIA FUENTES MD  A DIAGNOSIS:     Endometrioid metaplasia. Negative for dysplasia, hyperplasia, atypia, or malignancy.  A COMMENT:     See comment         A Vimentin immunostain is positive in the stroma         CEA immunostain is negative.         P16 stain is negative.         PAX 8 is negative.         All positive and required negative controls       stained appropriately.

## 2022-10-31 ENCOUNTER — OFFICE VISIT (OUTPATIENT)
Dept: FAMILY MEDICINE | Facility: CLINIC | Age: 27
End: 2022-10-31
Payer: COMMERCIAL

## 2022-10-31 VITALS
DIASTOLIC BLOOD PRESSURE: 80 MMHG | BODY MASS INDEX: 21.67 KG/M2 | SYSTOLIC BLOOD PRESSURE: 125 MMHG | OXYGEN SATURATION: 98 % | RESPIRATION RATE: 18 BRPM | HEIGHT: 63 IN | WEIGHT: 122.3 LBS | HEART RATE: 76 BPM | TEMPERATURE: 98.2 F

## 2022-10-31 DIAGNOSIS — H65.92 OME (OTITIS MEDIA WITH EFFUSION), LEFT: Primary | ICD-10-CM

## 2022-10-31 DIAGNOSIS — H61.23 BILATERAL IMPACTED CERUMEN: ICD-10-CM

## 2022-10-31 PROCEDURE — 69209 REMOVE IMPACTED EAR WAX UNI: CPT | Mod: 50 | Performed by: FAMILY MEDICINE

## 2022-10-31 PROCEDURE — 99213 OFFICE O/P EST LOW 20 MIN: CPT | Mod: 25 | Performed by: FAMILY MEDICINE

## 2022-10-31 RX ORDER — AMOXICILLIN 875 MG
875 TABLET ORAL 2 TIMES DAILY
Qty: 20 TABLET | Refills: 0 | Status: SHIPPED | OUTPATIENT
Start: 2022-10-31 | End: 2022-11-10

## 2022-10-31 NOTE — PROGRESS NOTES
Clinical Decision Making:    At the end of the encounter, I discussed results, diagnosis, medications. Discussed red flags for immediate return to clinic/ER, as well as indications for follow up if no improvement. Patient understood and agreed to plan. Patient was stable for discharge.      ICD-10-CM    1. OME (otitis media with effusion), left  H65.92 amoxicillin (AMOXIL) 875 MG tablet      2. Bilateral impacted cerumen  H61.23         Treating ear infection with amoxicillin 875 mg twice daily for 10 days  Cerumen impaction resolved  Acetaminophen (Tylenol) 500mg tablets.  You may take 2 tabs every 4-6 hours as needed  Ibuprofen (Advil, Motrin) 200mg tablets.  You may take 3 tabs every 6-8 hours as needed with food.  Follow-up if not improving as anticipated  Patient verbalized understanding      There are no Patient Instructions on file for this visit.   No follow-ups on file.      chief complaint    HPI:  Janneth Hisrch is a 27 year old female who presents today complaining of ear pain since about 2 AM last night.  Patient developed cold symptoms on Friday, 3 days ago.  She had cough, sore throat and nasal congestion.  By Sunday, yesterday, she was feeling better.  Last night in the middle the night she developed left-sided ear pain and today she has developed a headache.  She had a history of otitis media frequently as a child    History obtained from the patient.    Problem List:  2019-07: Demyelinating disease of central nervous system (H)  2008-06: MILD INTERMITTENT ASTHMA [493.90AJ]  2006-06: CENT PERF TYMPANIC MEMB-right      Past Medical History:   Diagnosis Date     Allergic rhinitis, cause unspecified     Allergic rhinitis     Methicillin susceptible Staphylococcus aureus in conditions classified elsewhere and of unspecified site 6/25/02     Unspecified asthma(493.90) 1/11/05    exacerbation of asthma     Unspecified conductive hearing loss      Unspecified otitis media childhood    Recurrent otitis  "      Social History     Tobacco Use     Smoking status: Never     Smokeless tobacco: Never     Tobacco comments:     second hand smoke in the home   Substance Use Topics     Alcohol use: No       Review of systems  negative except listed in HPI    Vitals:    10/31/22 1723   BP: 125/80   Pulse: 76   Resp: 18   Temp: 98.2  F (36.8  C)   SpO2: 98%   Weight: 55.5 kg (122 lb 4.8 oz)   Height: 1.588 m (5' 2.52\")       Physical Exam  Vitals noted and within normal limits  In general she is alert, oriented, and in no acute distress  Ears: Canals blocked bilaterally by cerumen impaction  CMA performed ear lavage  Recheck of ears shows canals patent, TMs intact with left TM erythematous and bulging and right TM within normal limits  Mouth mucous members are pink and moist and pharynx is nonerythematous  Breathing is not labored        Answers for HPI/ROS submitted by the patient on 10/31/2022  How many servings of fruits and vegetables do you eat daily?: 0-1  On average, how many sweetened beverages do you drink each day (Examples: soda, juice, sweet tea, etc.  Do NOT count diet or artificially sweetened beverages)?: 2  How many minutes a day do you exercise enough to make your heart beat faster?: 10 to 19  How many days a week do you exercise enough to make your heart beat faster?: 3 or less  How many days per week do you miss taking your medication?: 1  What is the reason for your visit today?: Earache  When did your symptoms begin?: 1-3 days ago  What are your symptoms?: Earache, cough, headache  How would you describe these symptoms?: Severe  Are your symptoms:: Staying the same  Have you had these symptoms before?: Yes  Have you tried or received treatment for these symptoms before?: Yes  Did that treatment work? : Yes  Please describe the treatment you had:: Amoxicillin  Is there anything that makes you feel worse?: It goes back and forth throughout the day  Is there anything that makes you feel better?: Sometimes a " hot pack on my ear

## 2022-10-31 NOTE — PATIENT INSTRUCTIONS
Acetaminophen (Tylenol) 500mg tablets.  You may take 2 tabs every 4-6 hours as needed  Ibuprofen (Advil, Motrin) 200mg tablets.  You may take 3 tabs every 6-8 hours as needed with food.

## 2022-11-01 ENCOUNTER — TELEPHONE (OUTPATIENT)
Dept: NEUROLOGY | Facility: CLINIC | Age: 27
End: 2022-11-01

## 2022-11-01 NOTE — TELEPHONE ENCOUNTER
PA Initiation    Medication: Vumerity 231MG Capsules (PA PENDING)  Insurance Company: [employer] - Phone [phone] Fax [phone]  [facility]  Filling Pharmacy Phone:    Filling Pharmacy Fax:    Start Date: 11/1/2022    PA renewal for Vumerity:        Thank you,    Jodi Browning [facility]  Specialty Pharmacy Clinic Liaison - CardiologyNeurologyMultiple [facility] [address] [address]  Ph: [phone] Fax: [phone]  [email or web address]

## 2022-11-02 NOTE — TELEPHONE ENCOUNTER
Prior Authorization Approval    Authorization Effective Date: 11/1/2022  Authorization Expiration Date: 11/1/2023  Medication: Vumerity 231MG Capsules (PA APPROVED)  Approved Dose/Quantity: 30 days  Reference #:     Insurance Company: [employer] - Phone [phone] Fax [phone]  Expected CoPay:       CoPay Card Available: Yes    Foundation Assistance Needed:    Which Pharmacy is filling the prescription (Not needed for infusion/clinic administered): [facility] - [place] [facility]  Pharmacy Notified:    Patient Notified:

## 2022-12-26 ENCOUNTER — HEALTH MAINTENANCE LETTER (OUTPATIENT)
Age: 27
End: 2022-12-26

## 2023-01-17 ENCOUNTER — OFFICE VISIT (OUTPATIENT)
Dept: FAMILY MEDICINE | Facility: CLINIC | Age: 28
End: 2023-01-17
Payer: COMMERCIAL

## 2023-01-17 VITALS
HEIGHT: 63 IN | TEMPERATURE: 99.2 F | WEIGHT: 124.2 LBS | HEART RATE: 75 BPM | SYSTOLIC BLOOD PRESSURE: 122 MMHG | BODY MASS INDEX: 22.01 KG/M2 | RESPIRATION RATE: 16 BRPM | OXYGEN SATURATION: 97 % | DIASTOLIC BLOOD PRESSURE: 79 MMHG

## 2023-01-17 DIAGNOSIS — R21 MACULAR RASH: Primary | ICD-10-CM

## 2023-01-17 PROBLEM — Z79.60 LONG-TERM USE OF IMMUNOSUPPRESSANT MEDICATION: Status: ACTIVE | Noted: 2023-01-17

## 2023-01-17 PROBLEM — R51.9 HEADACHE: Status: ACTIVE | Noted: 2023-01-17

## 2023-01-17 PROBLEM — G35 MULTIPLE SCLEROSIS (H): Status: ACTIVE | Noted: 2019-07-03

## 2023-01-17 PROBLEM — N94.4 PRIMARY DYSMENORRHEA: Status: ACTIVE | Noted: 2023-01-17

## 2023-01-17 PROBLEM — N84.1 POLYP OF CERVIX: Status: ACTIVE | Noted: 2023-01-17

## 2023-01-17 PROCEDURE — 99213 OFFICE O/P EST LOW 20 MIN: CPT | Performed by: FAMILY MEDICINE

## 2023-01-17 RX ORDER — ALBUTEROL SULFATE 90 UG/1
2 AEROSOL, METERED RESPIRATORY (INHALATION) 4 TIMES DAILY
COMMUNITY
Start: 2021-02-04

## 2023-01-17 NOTE — PROGRESS NOTES
"  Assessment & Plan     Macular rash  Patient reports intermittent macular rash the differential including chronic urticaria, cholinergic urticaria, other physical urticaria  She will start loratadine 10 mg daily and follow-up if not improving.                   No follow-ups on file.    Bridger Sargent MD  [facility]    Jonathan Lagunas is a 27 year old accompanied by her self, presenting for the following health issues:  Derm Problem (Concerns about possible allergic rxn--itching, blotchiness to face, chest, arms)      History of Present Illness       Reason for visit:  Random itching and blotchiness  Symptom onset:  More than a month  Symptoms include:  Itching, redness, blotchiness  Symptom intensity:  Mild  Symptom progression:  Worsening  Had these symptoms before:  Yes  What makes it worse:  No  What makes it better:  Itch cream    She eats 0-1 servings of fruits and vegetables daily.She consumes 1 sweetened beverage(s) daily.She exercises with enough effort to increase her heart rate 9 or less minutes per day.  She exercises with enough effort to increase her heart rate 3 or less days per week. She is missing 2 dose(s) of medications per week.  She is not taking prescribed medications regularly due to remembering to take.     Symptoms have been present for the last year  No know causative factors        Review of Systems   Constitutional, HEENT, cardiovascular, pulmonary, gi and gu systems are negative, except as otherwise noted.      Objective    /79 (BP Location: Right arm, Patient Position: Sitting, Cuff Size: Adult Regular)   Pulse 75   Temp 99.2  F (37.3  C) (Tympanic)   Resp 16   Ht 1.588 m (5' 2.5\")   Wt 56.3 kg (124 lb 3.2 oz)   SpO2 97%   BMI 22.35 kg/m    Body mass index is 22.35 kg/m .  Physical Exam   Alert, oriented, no acute distress  Heart has regular rate and rhythm  Lungs are clear  Skin exam reveals no significant rashes or lesions.  She has " minimal dermographism

## 2023-04-16 ENCOUNTER — HEALTH MAINTENANCE LETTER (OUTPATIENT)
Age: 28
End: 2023-04-16

## 2023-05-01 DIAGNOSIS — G35 MULTIPLE SCLEROSIS (H): ICD-10-CM

## 2023-05-02 RX ORDER — DIROXIMEL FUMARATE 231 MG/1
CAPSULE ORAL
Qty: 120 CAPSULE | Refills: 1 | Status: SHIPPED | OUTPATIENT
Start: 2023-05-02 | End: 2023-08-18

## 2023-05-02 NOTE — TELEPHONE ENCOUNTER
Received refill request for Vumerity from [facility]; Patient was last seen in May 2022 and has follow up appointment in May 2023 with Dr Dee. Refilled per MS refill protocol.    Romelia Kapoor RN

## 2023-05-30 ENCOUNTER — ANCILLARY PROCEDURE (OUTPATIENT)
Dept: MRI IMAGING | Facility: CLINIC | Age: 28
End: 2023-05-30
Attending: PSYCHIATRY & NEUROLOGY
Payer: COMMERCIAL

## 2023-05-30 ENCOUNTER — OFFICE VISIT (OUTPATIENT)
Dept: NEUROLOGY | Facility: CLINIC | Age: 28
End: 2023-05-30
Attending: PSYCHIATRY & NEUROLOGY
Payer: COMMERCIAL

## 2023-05-30 VITALS
HEART RATE: 68 BPM | BODY MASS INDEX: 22.52 KG/M2 | WEIGHT: 125.1 LBS | DIASTOLIC BLOOD PRESSURE: 76 MMHG | SYSTOLIC BLOOD PRESSURE: 116 MMHG | OXYGEN SATURATION: 97 %

## 2023-05-30 DIAGNOSIS — G35 MS (MULTIPLE SCLEROSIS) (H): ICD-10-CM

## 2023-05-30 DIAGNOSIS — G35 MS (MULTIPLE SCLEROSIS) (H): Primary | ICD-10-CM

## 2023-05-30 PROCEDURE — A9585 GADOBUTROL INJECTION: HCPCS | Performed by: RADIOLOGY

## 2023-05-30 PROCEDURE — 70553 MRI BRAIN STEM W/O & W/DYE: CPT | Mod: GC | Performed by: RADIOLOGY

## 2023-05-30 PROCEDURE — 99214 OFFICE O/P EST MOD 30 MIN: CPT | Performed by: PSYCHIATRY & NEUROLOGY

## 2023-05-30 PROCEDURE — G0463 HOSPITAL OUTPT CLINIC VISIT: HCPCS

## 2023-05-30 RX ORDER — GADOBUTROL 604.72 MG/ML
7.5 INJECTION INTRAVENOUS ONCE
Status: COMPLETED | OUTPATIENT
Start: 2023-05-30 | End: 2023-05-30

## 2023-05-30 RX ADMIN — GADOBUTROL 6 ML: 604.72 INJECTION INTRAVENOUS at 08:24

## 2023-05-30 ASSESSMENT — PAIN SCALES - GENERAL: PAINLEVEL: NO PAIN (0)

## 2023-05-30 NOTE — PROGRESS NOTES
ID: Janneth Hirsch is a 27-year-old woman who I follow for multiple sclerosis.  She returns for clinical follow-up and MRI review.  I last saw her 1 year ago.    History of present illness: Janneth has been doing great.  She has had no new symptoms suggestive of interim relapse or if disease progression.  She has no residual symptoms of MS.  Onset was in June 2019 with left hemiparesis and dysarthria.  She had an enhancing lesion in the right thalamus and internal capsule as well as spinal lesions and positive CSF.  She was started on dimethyl fumarate and changed to Vumerity because of insurance coverage/co-pay issues.  She tolerates the Vumerity well, with no flushing or other side effects.  She and her  are wanting to pursue pregnancy.  We have discussed this in the past and did so again today.  She is significantly needle phobic and glatiramer acetate is not an option.  She is not planning on breast-feeding and she prefers to stop the Vumerity and restart it after childbirth.    Past Medical History:   Diagnosis Date     Allergic rhinitis, cause unspecified     Allergic rhinitis     Methicillin susceptible Staphylococcus aureus in conditions classified elsewhere and of unspecified site 6/25/02     Unspecified asthma(493.90) 1/11/05    exacerbation of asthma     Unspecified conductive hearing loss      Unspecified otitis media childhood    Recurrent otitis       Current Outpatient Medications:      albuterol (PROAIR HFA/PROVENTIL HFA/VENTOLIN HFA) 108 (90 Base) MCG/ACT inhaler, Inhale 2 puffs into the lungs 4 times daily, Disp: , Rfl:      norgestimate-ethinyl estradiol (ORTHO-CYCLEN/SPRINTEC) 0.25-35 MG-MCG tablet, daily, Disp: , Rfl:      VUMERITY 231 MG CPDR, TAKE 2 CAPSULES BY MOUTH 2 TIMES A DAY., Disp: 120 capsule, Rfl: 1  No current facility-administered medications for this visit.     Exam: She is alert and oriented.  Affect is bright and language functions are normal.  Cranial nerves are unremarkable.   Muscle bulk tone and strength and dexterity are normal in the arms and legs.  Light touch is intact in all 4 limbs.  Finger tapping toe tapping and finger-nose-finger are normal.  Reflexes are normal and symmetric at the elbows and knees.  Gait is narrow based and stable with normal tandem walk and negative Romberg.    We reviewed together her MRI of the brain done earlier today and compared it to the previous study from September 2021.  Again seen are mild to moderate burden of typical white matter lesions.  There were no new or enhancing lesions.    Impression: Relapsing remitting MS, clinically and radiologically stable on diroximel fumarate.  I spent 36 minutes on her care today including chart review face-to-face and documentation time.  We discussed the pregnancy issue again in detail.  She will stop the Vumerity immediately.  I advised her to wait 2 to 4 weeks before attempting pregnancy.  We will tentatively schedule her to follow-up in 1 year but can adjust that depending on the timing of her pregnancy.  I will probably want to repeat an MRI for new baseline once she restarts on treatment.    Plan: Follow-up in 1 year.

## 2023-05-30 NOTE — NURSING NOTE
Chief Complaint   Patient presents with     MS     RECHECK     Ms annual follow up      Vitals were taken and medications were reconciled.   Shaan Contreras, EMT  9:25 AM

## 2023-05-30 NOTE — LETTER
5/30/2023       RE: Janneth Hirsch  [address]  [place] [address]       Dear Colleague,    Thank you for referring your patient, Janneth Hirsch, to the [facility] [place] at [facility]. Please see a copy of my visit note below.    ID: Janneth Hirsch is a 27-year-old woman who I follow for multiple sclerosis.  She returns for clinical follow-up and MRI review.  I last saw her 1 year ago.    History of present illness: Janneth has been doing great.  She has had no new symptoms suggestive of interim relapse or if disease progression.  She has no residual symptoms of MS.  Onset was in June 2019 with left hemiparesis and dysarthria.  She had an enhancing lesion in the right thalamus and internal capsule as well as spinal lesions and positive CSF.  She was started on dimethyl fumarate and changed to Vumerity because of insurance coverage/co-pay issues.  She tolerates the Vumerity well, with no flushing or other side effects.  She and her  are wanting to pursue pregnancy.  We have discussed this in the past and did so again today.  She is significantly needle phobic and glatiramer acetate is not an option.  She is not planning on breast-feeding and she prefers to stop the Vumerity and restart it after childbirth.    Past Medical History:   Diagnosis Date    Allergic rhinitis, cause unspecified     Allergic rhinitis    Methicillin susceptible Staphylococcus aureus in conditions classified elsewhere and of unspecified site 6/25/02    Unspecified asthma(493.90) 1/11/05    exacerbation of asthma    Unspecified conductive hearing loss     Unspecified otitis media childhood    Recurrent otitis       Current Outpatient Medications:     albuterol (PROAIR HFA/PROVENTIL HFA/VENTOLIN HFA) 108 (90 Base) MCG/ACT inhaler, Inhale 2 puffs into the lungs 4 times daily, Disp: , Rfl:     norgestimate-ethinyl estradiol  (ORTHO-CYCLEN/SPRINTEC) 0.25-35 MG-MCG tablet, daily, Disp: , Rfl:     VUMERITY 231 MG CPDR, TAKE 2 CAPSULES BY MOUTH 2 TIMES A DAY., Disp: 120 capsule, Rfl: 1  No current facility-administered medications for this visit.     Exam: She is alert and oriented.  Affect is bright and language functions are normal.  Cranial nerves are unremarkable.  Muscle bulk tone and strength and dexterity are normal in the arms and legs.  Light touch is intact in all 4 limbs.  Finger tapping toe tapping and finger-nose-finger are normal.  Reflexes are normal and symmetric at the elbows and knees.  Gait is narrow based and stable with normal tandem walk and negative Romberg.    We reviewed together her MRI of the brain done earlier today and compared it to the previous study from September 2021.  Again seen are mild to moderate burden of typical white matter lesions.  There were no new or enhancing lesions.    Impression: Relapsing remitting MS, clinically and radiologically stable on diroximel fumarate.  I spent 36 minutes on her care today including chart review face-to-face and documentation time.  We discussed the pregnancy issue again in detail.  She will stop the Vumerity immediately.  I advised her to wait 2 to 4 weeks before attempting pregnancy.  We will tentatively schedule her to follow-up in 1 year but can adjust that depending on the timing of her pregnancy.  I will probably want to repeat an MRI for new baseline once she restarts on treatment.    Plan: Follow-up in 1 year.          Again, thank you for allowing me to participate in the care of your patient.      Sincerely,    Lewis Dee MD

## 2023-08-18 ENCOUNTER — OFFICE VISIT (OUTPATIENT)
Dept: FAMILY MEDICINE | Facility: CLINIC | Age: 28
End: 2023-08-18
Payer: COMMERCIAL

## 2023-08-18 VITALS
RESPIRATION RATE: 12 BRPM | DIASTOLIC BLOOD PRESSURE: 60 MMHG | HEART RATE: 88 BPM | SYSTOLIC BLOOD PRESSURE: 112 MMHG | TEMPERATURE: 97.4 F | BODY MASS INDEX: 23.24 KG/M2 | OXYGEN SATURATION: 99 % | WEIGHT: 129.1 LBS

## 2023-08-18 DIAGNOSIS — M25.50 MULTIPLE JOINT PAIN: Primary | ICD-10-CM

## 2023-08-18 LAB
CRP SERPL-MCNC: <3 MG/L
ERYTHROCYTE [SEDIMENTATION RATE] IN BLOOD BY WESTERGREN METHOD: 6 MM/HR (ref 0–20)

## 2023-08-18 PROCEDURE — 36415 COLL VENOUS BLD VENIPUNCTURE: CPT | Performed by: FAMILY MEDICINE

## 2023-08-18 PROCEDURE — 85652 RBC SED RATE AUTOMATED: CPT | Performed by: FAMILY MEDICINE

## 2023-08-18 PROCEDURE — 99213 OFFICE O/P EST LOW 20 MIN: CPT | Performed by: FAMILY MEDICINE

## 2023-08-18 PROCEDURE — 86431 RHEUMATOID FACTOR QUANT: CPT | Performed by: FAMILY MEDICINE

## 2023-08-18 PROCEDURE — 86140 C-REACTIVE PROTEIN: CPT | Performed by: FAMILY MEDICINE

## 2023-08-18 PROCEDURE — 86200 CCP ANTIBODY: CPT | Performed by: FAMILY MEDICINE

## 2023-08-18 NOTE — LETTER
August 22, 2023      Janneth Hirsch  [address]  [place] [address]        Dear ,    We are writing to inform you of your test results.    Your test results fall within the expected range(s) or remain unchanged from previous results.  Please continue with current treatment plan.    Resulted Orders   CRP inflammation   Result Value Ref Range    CRP Inflammation <3.00 <5.00 mg/L   Cyclic Citrullinated Peptide Antibody IgG   Result Value Ref Range    Cyclic Citrullinated Peptide Antibody IgG 1.4 <7.0 U/mL      Comment:      Negative   Erythrocyte sedimentation rate auto   Result Value Ref Range    Erythrocyte Sedimentation Rate 6 0 - 20 mm/hr   Rheumatoid factor   Result Value Ref Range    Rheumatoid Factor 6 <12 IU/mL       If you have any questions or concerns, please call the clinic at the number listed above.       Sincerely,      Beau Tee MD

## 2023-08-18 NOTE — PROGRESS NOTES
Assessment & Plan     Multiple joint pain  Patient with joint pain that is resolved no findings on exam today given the history we will go ahead and check initially for RA.  If her symptoms continue to recur we will look at referral.  If labs are abnormal we will also look at referral  - CRP inflammation; Future  - Cyclic Citrullinated Peptide Antibody IgG; Future  - Erythrocyte sedimentation rate auto; Future  - Rheumatoid factor; Future                 Beau Tee MD  [facility]    Jonathan Lagunas is a 28 year old, presenting for the following health issues: Patient wants to be checked for with her arthritis.  She notes that she had an episode where the index and fourth finger MCPs got sore tender and stiff.  She notes it resolved over a few weeks but she wants to be checked.  Strong family history in her father and multiple relatives with rheumatoid arthritis.  She does have MS and came off her MS drugs recently to try to get pregnant.  Consult (Rheumatoid Arthritis - Large Family History.)        8/18/2023     9:21 AM   Additional Questions   Roomed by Leann LEONE CMA   Accompanied by None       History of Present Illness       Reason for visit:  Joint pain in left hand  Symptom onset:  1-2 weeks ago  Symptoms include:  Hand pain  Symptom intensity:  Moderate  Symptom progression:  Improving  Had these symptoms before:  No  What makes it worse:  Making a fist, using it  What makes it better:  Using it more sometimes helps    She eats 0-1 servings of fruits and vegetables daily.She consumes 1 sweetened beverage(s) daily.She exercises with enough effort to increase her heart rate 9 or less minutes per day.  She exercises with enough effort to increase her heart rate 3 or less days per week. She is missing 2 dose(s) of medications per week.  She is not taking prescribed medications regularly due to remembering to take.               Review of Systems   Constitutional, HEENT,  cardiovascular, pulmonary, gi and gu systems are negative, except as otherwise noted.      Objective    /60   Pulse 88   Temp 97.4  F (36.3  C)   Resp 12   Wt 58.6 kg (129 lb 1.6 oz)   LMP 08/10/2023 (Approximate)   SpO2 99%   BMI 23.24 kg/m    Body mass index is 23.24 kg/m .  Physical Exam   Alert no apparent distress today joint exam today was normal

## 2023-08-21 LAB
CCP AB SER IA-ACNC: 1.4 U/ML
RHEUMATOID FACT SER NEPH-ACNC: 6 IU/ML

## 2024-04-30 ENCOUNTER — MYC MEDICAL ADVICE (OUTPATIENT)
Dept: NEUROLOGY | Facility: CLINIC | Age: 29
End: 2024-04-30
Payer: COMMERCIAL

## 2024-04-30 DIAGNOSIS — G35 MULTIPLE SCLEROSIS (H): Primary | ICD-10-CM

## 2024-05-02 ENCOUNTER — MYC MEDICAL ADVICE (OUTPATIENT)
Dept: FAMILY MEDICINE | Facility: CLINIC | Age: 29
End: 2024-05-02

## 2024-05-02 ENCOUNTER — OFFICE VISIT (OUTPATIENT)
Dept: FAMILY MEDICINE | Facility: CLINIC | Age: 29
End: 2024-05-02
Payer: COMMERCIAL

## 2024-05-02 VITALS
SYSTOLIC BLOOD PRESSURE: 91 MMHG | WEIGHT: 125.3 LBS | OXYGEN SATURATION: 98 % | HEART RATE: 76 BPM | TEMPERATURE: 98.3 F | BODY MASS INDEX: 22.2 KG/M2 | RESPIRATION RATE: 16 BRPM | HEIGHT: 63 IN | DIASTOLIC BLOOD PRESSURE: 60 MMHG

## 2024-05-02 DIAGNOSIS — Z87.59 HISTORY OF PRE-ECLAMPSIA: ICD-10-CM

## 2024-05-02 DIAGNOSIS — D75.839 THROMBOCYTOSIS: ICD-10-CM

## 2024-05-02 LAB
BASOPHILS # BLD AUTO: 0.1 10E3/UL (ref 0–0.2)
BASOPHILS NFR BLD AUTO: 1 %
EOSINOPHIL # BLD AUTO: 0.1 10E3/UL (ref 0–0.7)
EOSINOPHIL NFR BLD AUTO: 1 %
ERYTHROCYTE [DISTWIDTH] IN BLOOD BY AUTOMATED COUNT: 13.6 % (ref 10–15)
HCT VFR BLD AUTO: 46.8 % (ref 35–47)
HGB BLD-MCNC: 14.4 G/DL (ref 11.7–15.7)
IMM GRANULOCYTES # BLD: 0 10E3/UL
IMM GRANULOCYTES NFR BLD: 0 %
LYMPHOCYTES # BLD AUTO: 1.7 10E3/UL (ref 0.8–5.3)
LYMPHOCYTES NFR BLD AUTO: 17 %
MCH RBC QN AUTO: 26.9 PG (ref 26.5–33)
MCHC RBC AUTO-ENTMCNC: 30.8 G/DL (ref 31.5–36.5)
MCV RBC AUTO: 88 FL (ref 78–100)
MONOCYTES # BLD AUTO: 0.8 10E3/UL (ref 0–1.3)
MONOCYTES NFR BLD AUTO: 7 %
NEUTROPHILS # BLD AUTO: 7.8 10E3/UL (ref 1.6–8.3)
NEUTROPHILS NFR BLD AUTO: 74 %
PLATELET # BLD AUTO: 485 10E3/UL (ref 150–450)
RBC # BLD AUTO: 5.35 10E6/UL (ref 3.8–5.2)
WBC # BLD AUTO: 10.6 10E3/UL (ref 4–11)

## 2024-05-02 PROCEDURE — 83880 ASSAY OF NATRIURETIC PEPTIDE: CPT | Performed by: FAMILY MEDICINE

## 2024-05-02 PROCEDURE — 80053 COMPREHEN METABOLIC PANEL: CPT | Performed by: FAMILY MEDICINE

## 2024-05-02 PROCEDURE — 85025 COMPLETE CBC W/AUTO DIFF WBC: CPT | Mod: QW | Performed by: FAMILY MEDICINE

## 2024-05-02 PROCEDURE — G2211 COMPLEX E/M VISIT ADD ON: HCPCS | Performed by: FAMILY MEDICINE

## 2024-05-02 PROCEDURE — 36415 COLL VENOUS BLD VENIPUNCTURE: CPT | Performed by: FAMILY MEDICINE

## 2024-05-02 PROCEDURE — 99214 OFFICE O/P EST MOD 30 MIN: CPT | Performed by: FAMILY MEDICINE

## 2024-05-02 RX ORDER — FUROSEMIDE 20 MG
20 TABLET ORAL DAILY PRN
COMMUNITY
Start: 2024-04-29

## 2024-05-02 RX ORDER — SPIRONOLACTONE 25 MG/1
1 TABLET ORAL EVERY MORNING
COMMUNITY
Start: 2024-04-30 | End: 2024-08-06

## 2024-05-02 RX ORDER — CARVEDILOL 12.5 MG/1
12.5 TABLET ORAL 2 TIMES DAILY WITH MEALS
Qty: 180 TABLET | Refills: 1 | Status: SHIPPED | OUTPATIENT
Start: 2024-05-02 | End: 2024-07-09

## 2024-05-02 RX ORDER — DAPAGLIFLOZIN 10 MG/1
1 TABLET, FILM COATED ORAL DAILY
COMMUNITY
Start: 2024-04-29

## 2024-05-02 RX ORDER — CARVEDILOL 25 MG/1
25 TABLET ORAL
COMMUNITY
Start: 2024-04-29 | End: 2024-05-02

## 2024-05-02 NOTE — PROGRESS NOTES
Assessment & Plan   Problem List Items Addressed This Visit       Peripartum cardiomyopathy - Primary    Relevant Medications    carvedilol (COREG) 12.5 MG tablet    Other Relevant Orders    CBC with Platelets & Differential    Comprehensive metabolic panel (BMP + Alb, Alk Phos, ALT, AST, Total. Bili, TP)    BNP-N terminal pro    Adult Cardiology Eval  Referral    Echocardiogram Complete    History of pre-eclampsia      Patient admitted to [facility] April 25 and discharged April 29 with preeclampsia, severe, and peripartum cardiomyopathy.  She is here today with her , and her daughter, gayla.    She does not yet have outpatient cardiology follow-up established for her 1 month follow-up echocardiogram scheduled.  She was discharged on carvedilol 25 mg twice daily, amlodipine 10 mg daily, Farxiga 10 mg daily, spironolactone 25 mg daily, Entresto  daily and furosemide 20 mg daily.  Yesterday she had symptomatic low blood pressure with a systolic blood pressure in the 80s.  She spoke with her inpatient cardiology team and her amlodipine was discontinued.  Blood pressure today is still pretty low at 91/60.  She is not planning to breast-feed, she has been cleared to resume her multiple sclerosis medications.    On exam  Lungs are clear, cardiovascular has a normal S1-S2, legs have no swelling,    Assessment and plan  Peripartum cardiomyopathy message sent to Ramiro Ryder, Chelle, and Narinder to see if we can get her set up with outpatient cardiology.  Also placed a 1 month follow-up order for her echocardiogram.  Will decrease her Coreg to 12 and half milligrams twice daily.  Would like her to monitor her blood pressures and if they remain low we may need to decrease her Entresto.  Cardiology will be in touch with her soon.  If not she should follow-up with me if she has symptomatic hypotension or has worsening of shortness of breath.  Also getting a baseline Bnp  "today.    Multiple sclerosis she is going to resume her outpatient medications that she took prior to pregnancy.    Severe preeclampsia we will check a CBC and comprehensive metabolic panel today.     The longitudinal plan of care for the diagnosis(es)/condition(s) as documented were addressed during this visit. Due to the added complexity in care, I will continue to support Janneth in the subsequent management and with ongoing continuity of care.                 MED REC REQUIREDupdated med list  Post Medication Reconciliation Status:           Jonathan Lagunas is a 28 year old, presenting for the following health issues:  Hospital F/U (Seen in ED perpartum cardiomyopathy 4/25)        5/2/2024     8:49 AM   Additional Questions   Roomed by Lu SAPP                   Objective    BP 91/60 (BP Location: Right arm, Patient Position: Sitting)   Pulse 76   Temp 98.3  F (36.8  C) (Tympanic)   Resp 16   Ht 1.588 m (5' 2.5\")   Wt 56.8 kg (125 lb 4.8 oz)   LMP  (LMP Unknown)   SpO2 98%   BMI 22.55 kg/m    Body mass index is 22.55 kg/m .  Physical Exam               Signed Electronically by: Missy Ford MD    "

## 2024-05-03 ENCOUNTER — TELEPHONE (OUTPATIENT)
Dept: NEUROLOGY | Facility: CLINIC | Age: 29
End: 2024-05-03
Payer: COMMERCIAL

## 2024-05-03 LAB
ALBUMIN SERPL BCG-MCNC: 4.5 G/DL (ref 3.5–5.2)
ALP SERPL-CCNC: 155 U/L (ref 40–150)
ALT SERPL W P-5'-P-CCNC: 32 U/L (ref 0–50)
ANION GAP SERPL CALCULATED.3IONS-SCNC: 14 MMOL/L (ref 7–15)
AST SERPL W P-5'-P-CCNC: 26 U/L (ref 0–45)
BILIRUB SERPL-MCNC: 0.2 MG/DL
BUN SERPL-MCNC: 18.5 MG/DL (ref 6–20)
CALCIUM SERPL-MCNC: 10.3 MG/DL (ref 8.6–10)
CHLORIDE SERPL-SCNC: 103 MMOL/L (ref 98–107)
CREAT SERPL-MCNC: 0.68 MG/DL (ref 0.51–0.95)
DEPRECATED HCO3 PLAS-SCNC: 23 MMOL/L (ref 22–29)
EGFRCR SERPLBLD CKD-EPI 2021: >90 ML/MIN/1.73M2
GLUCOSE SERPL-MCNC: 74 MG/DL (ref 70–99)
NT-PROBNP SERPL-MCNC: 230 PG/ML (ref 0–450)
POTASSIUM SERPL-SCNC: 5 MMOL/L (ref 3.4–5.3)
PROT SERPL-MCNC: 7.7 G/DL (ref 6.4–8.3)
SODIUM SERPL-SCNC: 140 MMOL/L (ref 135–145)

## 2024-05-03 RX ORDER — DIROXIMEL FUMARATE 231 MG/1
2 CAPSULE ORAL 2 TIMES DAILY
Qty: 120 CAPSULE | Refills: 11 | Status: SHIPPED | OUTPATIENT
Start: 2024-05-03

## 2024-05-03 NOTE — TELEPHONE ENCOUNTER
Prior Authorization Approval    Medication: VUMERITY 231 MG PO CPDR  Authorization Effective Date: 5/3/2024  Authorization Expiration Date: 5/3/2025  Approved Dose/Quantity: 120 caps per 30 days  Reference #: [id number]   Insurance Company: [employer] [phone] Fax [phone]  Expected CoPay: $ 300  CoPay Card Available: Yes    Financial Assistance Needed:   Which Pharmacy is filling the prescription:    Pharmacy Notified: When RX is released, asking patient if they would like to fill with  specialty pharmacy.  Patient Notified: Yes          PA Initiation    Medication: VUMERITY 231 MG PO CPDR  Insurance Company: [employer] [phone] Fax [phone]  Pharmacy Filling the Rx:    Filling Pharmacy Phone:    Filling Pharmacy Fax:    Start Date: 5/3/2024

## 2024-05-03 NOTE — TELEPHONE ENCOUNTER
Patient would like to fill with [facility]. Called [employer] and got co-pay card info. Added to [facility] pharmacy system and released RX. Sending spec new pt email.

## 2024-05-06 ENCOUNTER — OFFICE VISIT (OUTPATIENT)
Dept: CARDIOLOGY | Facility: CLINIC | Age: 29
End: 2024-05-06
Attending: FAMILY MEDICINE
Payer: COMMERCIAL

## 2024-05-06 VITALS
BODY MASS INDEX: 21.6 KG/M2 | OXYGEN SATURATION: 100 % | WEIGHT: 120 LBS | DIASTOLIC BLOOD PRESSURE: 72 MMHG | HEART RATE: 70 BPM | RESPIRATION RATE: 16 BRPM | SYSTOLIC BLOOD PRESSURE: 105 MMHG

## 2024-05-06 DIAGNOSIS — I50.22 CHRONIC SYSTOLIC HEART FAILURE (H): Primary | ICD-10-CM

## 2024-05-06 DIAGNOSIS — I50.21 ACUTE SYSTOLIC CONGESTIVE HEART FAILURE (H): ICD-10-CM

## 2024-05-06 PROCEDURE — 99205 OFFICE O/P NEW HI 60 MIN: CPT | Performed by: INTERNAL MEDICINE

## 2024-05-06 RX ORDER — ACETAMINOPHEN 325 MG/1
325-650 TABLET ORAL EVERY 6 HOURS PRN
COMMUNITY
Start: 2024-04-29

## 2024-05-06 NOTE — PROGRESS NOTES
HEART CARE ENCOUNTER CONSULTATON NOTE      [facility]  [phone]      Assessment/Recommendations   Assessment:   Acute systolic congestive heart failure with reduced ejection fraction, moderate left ventricular enlargement severe reduction in systolic function.  Ejection fraction 29%.   Postpartum cardiomyopathy  Moderate functional mitral digitation  Preeclampsia, severe  Multiple sclerosis  Asthma    Plan:  Continue carvedilol 12.5 mg twice daily  Continue Entresto 97/103 mg BID.  May need to reduce further given her symptoms of lightheadedness at times  Continue Farxiga 10 mg daily, possible cause of nausea and weight loss  Continue spironolactone 25 mg daily  5.  Patient is having side effects from medications would like to check echocardiogram in the next 2 weeks to see if myocardium is recovering.  For significant recovery may need to de-escalate GDMT.        History of Present Illness/Subjective    HPI: Janneth Hirsch is a 28 year old female no prior cardiac history presents to cardiology clinic in consultation for recent development of acute systolic congestive heart failure related to postpartum cardiomyopathy.    Patient presented to [facility] with preeclampsia, severe dyspnea on exertion, orthopnea severe lower extremity edema and found to have postpartum cardiomyopathy.    She was transferred to [facility].  There she was placed on guideline directed medical therapy.  She was placed on Entresto, SGL 2, Aldactone and coreg.  She notes significant improvement in her symptoms.  Post discharge she has noted lightheadedness requiring de-escalation of her beta-blocker to 12.5 mg twice daily.  Blood pressure has improved with adjustments to medication.    Her main issues at this time is a decrease in appetite, nausea and some dyspnea on exertion.  She has no lower extremity edema.  Denies any orthopnea or PND symptoms.    She denies any  lightheadedness, palpitations or near syncope.  No recent syncopal episodes.    Reviewed medication in detail with the patient.    Echocardiogram Results: 4/25/2024   1. Moderate left ventricular chamber enlargement. Severely decreased left ventricular systolic   function. Calculated left ventricular    ejection fraction (modified Ovalles technique) is 29 %. Generalized left ventricular   hypokinesis.    2. Normal right ventricular size and systolic function.    3. Findings consistent with increased left ventricular filling pressure.    4. Mild left atrial enlargement.    5. Moderate, functional mitral valve regurgitation.    6. Small pericardial effusion. No echocardiographic evidence of cardiac tamponade.    7. Dilated inferior vena cava with normal inspiratory collapse.    8. There were no prior studies available for comparison.    9. Awaiting callback from ordering provider.    Estimated EF: 25-30%     CT Chest:   1.  Negative for pulmonary emboli.   2.  Diffuse bronchial wall thickening with bilateral perihilar groundglass airspace disease suggests a bilateral infectious or inflammatory pneumonitis.   3.  Intralobular septal thickening as seen with pulmonary edema with bilateral small pleural effusions suggests CHF versus fluid overload.      Physical Examination  Review of Systems   Vitals: /72 (BP Location: Right arm, Patient Position: Sitting, Cuff Size: Adult Regular)   Pulse 70   Resp 16   Wt 54.4 kg (120 lb)   LMP  (LMP Unknown)   SpO2 100%   BMI 21.60 kg/m    BMI= Body mass index is 21.6 kg/m .  Wt Readings from Last 3 Encounters:   05/06/24 54.4 kg (120 lb)   05/02/24 56.8 kg (125 lb 4.8 oz)   08/18/23 58.6 kg (129 lb 1.6 oz)        Pleasant   ENT/Mouth: membranes moist, no oral lesions or bleeding gums.      EYES:  no scleral icterus, normal conjunctivae       Chest/Lungs:   lungs are clear to auscultation, no rales or wheezing, no sternal scar, equal chest wall expansion    Cardiovascular:    Regular. Normal first and second heart sounds with no murmurs, rubs, or gallops; the carotid, radial and posterior tibial pulses are intact, Jugular venous pressure normal, no edema bilaterally    Abdomen:  no tenderness; bowel sounds are present   Extremities: no cyanosis or clubbing   Skin: no xanthelasma, warm.    Neurologic: normal  bilateral, no tremors     Psychiatric: alert and oriented x3, calm        Please refer above for cardiac ROS details.        Medical History  Surgical History Family History Social History   Past Medical History:   Diagnosis Date    Allergic rhinitis, cause unspecified     Allergic rhinitis    Methicillin susceptible Staphylococcus aureus in conditions classified elsewhere and of unspecified site 6/25/02    Unspecified asthma(493.90) 1/11/05    exacerbation of asthma    Unspecified conductive hearing loss     Unspecified otitis media childhood    Recurrent otitis     Past Surgical History:   Procedure Laterality Date    HC NASAL ENDOSCOPY, DIAGNOSTIC  4/7/08    HC TYMPANOPLASTY W/O MASTOID, INIT/REV W/O OSS CHAIN RECONST  10/16/06    LT    HC TYMPANOPLASTY W/O MASTOID, INIT/REV W/O OSS CHAIN RECONST  10/15/07    RT    [facility] CREATE EARDRUM OPENING,GEN ANESTH  1999, 2001    Myringotomy w tubes    [facility] CREATE EARDRUM OPENING,GEN ANESTH  4/7/08    RT     Family History   Problem Relation Age of Onset    Allergies Father         Tetanus and penicillin    Obesity Father     Psoriasis Father     Hypertension Maternal Grandmother     Arthritis Maternal Grandmother     Thyroid Disease Maternal Grandmother         Hypothyroidism    C.A.D. Maternal Grandfather     Hypertension Paternal Grandmother     Arthritis Paternal Grandmother     Obesity Paternal Grandmother     Gastrointestinal Disease Paternal Grandfather         pocketing in intestines    Rheumatoid Arthritis Paternal Aunt         Social History     Socioeconomic History    Marital status:      Spouse name: Not on file     Number of children: Not on file    Years of education: Not on file    Highest education level: Not on file   Occupational History    Occupation: student   Tobacco Use    Smoking status: Never     Passive exposure: Never    Smokeless tobacco: Never    Tobacco comments:     second hand smoke in the home   Vaping Use    Vaping status: Never Used   Substance and Sexual Activity    Alcohol use: No    Drug use: No    Sexual activity: Never   Other Topics Concern     Service No    Blood Transfusions No    Caffeine Concern Yes     Comment: pop daily    Occupational Exposure Not Asked    Hobby Hazards Not Asked    Sleep Concern Not Asked    Stress Concern Not Asked    Weight Concern Not Asked    Special Diet Not Asked    Back Care Not Asked    Exercise No    Bike Helmet Not Asked    Seat Belt Not Asked    Self-Exams No   Social History Narrative    Not on file     Social Determinants of Health     Financial Resource Strain: Low Risk  (10/25/2023)    [facility] [employer] [facility]    Financial Resource Strain     Difficulty of Paying Living Expenses: 3     Difficulty of Paying Living Expenses: Not on file   Food Insecurity: No Food Insecurity (10/25/2023)    [facility]    Food Insecurity     Worried About Running Out of Food in the Last Year: 1   Transportation Needs: No Transportation Needs (10/25/2023)    [facility] [employer] [facility], [facility]    Transportation Needs     Lack of Transportation (Medical): 1   Physical Activity: Not on file   Stress: Not on file   Social Connections: Socially Integrated (10/25/2023)    [facility], [facility]    Social Connections     Frequency of Communication with Friends  "and Family: 0   Interpersonal Safety: Not on file   Housing Stability: Low Risk  (10/25/2023)    Received from [facility] [employer] [facility], [facility] [facility]    Housing Stability     Unable to Pay for Housing in the Last Year: 1           Medications  Allergies   Current Outpatient Medications   Medication Sig Dispense Refill    acetaminophen (TYLENOL) 325 MG tablet Take 325-650 mg by mouth every 6 hours as needed      carvedilol (COREG) 12.5 MG tablet Take 1 tablet (12.5 mg) by mouth 2 times daily (with meals) 180 tablet 1    cholecalciferol (VITAMIN D3) 25 mcg (1000 units) capsule Take 1 tablet by mouth daily      dapagliflozin (FARXIGA) 10 MG TABS tablet Take 1 tablet by mouth daily      furosemide (LASIX) 20 MG tablet Take 20 mg by mouth daily as needed      sacubitril-valsartan (ENTRESTO)  MG per tablet Take 1 tablet by mouth daily      spironolactone (ALDACTONE) 25 MG tablet Take 1 tablet by mouth every morning      albuterol (PROAIR HFA/PROVENTIL HFA/VENTOLIN HFA) 108 (90 Base) MCG/ACT inhaler Inhale 2 puffs into the lungs 4 times daily (Patient not taking: Reported on 5/6/2024)      VUMERITY 231 MG CPDR Take 2 capsules by mouth 2 times daily (Patient not taking: Reported on 5/6/2024) 120 capsule 11       Allergies   Allergen Reactions    Adhesive Tape Rash    Pollen Extract Itching          Lab Results    Chemistry/lipid CBC Cardiac Enzymes/BNP/TSH/INR   No results for input(s): \"CHOL\", \"HDL\", \"LDL\", \"TRIG\", \"CHOLHDLRATIO\" in the last 33711 hours.  No results for input(s): \"LDL\" in the last 43072 hours.  Recent Labs   Lab Test 05/02/24  1014      POTASSIUM 5.0   CHLORIDE 103   CO2 23   GLC 74   BUN 18.5   CR 0.68   GFRESTIMATED >90   RENETTA 10.3*     Recent Labs   Lab Test 05/02/24  1014 07/04/19  0939 07/03/19  0203   CR 0.68 0.52 0.64     No results for input(s): \"A1C\" in the last 63215 hours.       Recent Labs   Lab Test 05/02/24  1014   WBC 10.6   HGB " "14.4   HCT 46.8   MCV 88   *     Recent Labs   Lab Test 05/02/24  1014 05/31/22  1104 08/04/20  1434   HGB 14.4 15.0 13.7    No results for input(s): \"TROPONINI\" in the last 99989 hours.  Recent Labs   Lab Test 05/02/24  1014   NTBNP 230     No results for input(s): \"TSH\" in the last 27909 hours.  Recent Labs   Lab Test 07/03/19  0616   INR 1.31*        Rich Corbett DO  Cardiologist     65 minutes spent by me on the date of the encounter doing chart review, history and exam, documentation and further activities per the note.  Extensive review of the chart.  Discussed case previously with primary care provider.                           "

## 2024-05-06 NOTE — LETTER
5/6/2024    Missy Ford MD  [address] [facility] [address]    RE: Janneth Hirsch       Dear Colleague,     I had the pleasure of seeing [facility].    HEART CARE ENCOUNTER CONSULTATON NOTE      [facility]  [phone]      Assessment/Recommendations   Assessment:   Acute systolic congestive heart failure with reduced ejection fraction, moderate left ventricular enlargement severe reduction in systolic function.  Ejection fraction 29%.   Postpartum cardiomyopathy  Moderate functional mitral digitation  Preeclampsia, severe  Multiple sclerosis  Asthma    Plan:  Continue carvedilol 12.5 mg twice daily  Continue Entresto 97/103 mg BID.  May need to reduce further given her symptoms of lightheadedness at times  Continue Farxiga 10 mg daily, possible cause of nausea and weight loss  Continue spironolactone 25 mg daily  5.  Patient is having side effects from medications would like to check echocardiogram in the next 2 weeks to see if myocardium is recovering.  For significant recovery may need to de-escalate GDMT.        History of Present Illness/Subjective    HPI: Janneth Hirsch is a 28 year old female no prior cardiac history presents to cardiology clinic in consultation for recent development of acute systolic congestive heart failure related to postpartum cardiomyopathy.    Patient presented to [facility] with preeclampsia, severe dyspnea on exertion, orthopnea severe lower extremity edema and found to have postpartum cardiomyopathy.    She was transferred to [facility].  There she was placed on guideline directed medical therapy.  She was placed on Entresto, SGL 2, Aldactone and coreg.  She notes significant improvement in her symptoms.  Post discharge she has noted lightheadedness requiring de-escalation of her beta-blocker to 12.5 mg twice daily.  Blood pressure has improved with adjustments to  medication.    Her main issues at this time is a decrease in appetite, nausea and some dyspnea on exertion.  She has no lower extremity edema.  Denies any orthopnea or PND symptoms.    She denies any lightheadedness, palpitations or near syncope.  No recent syncopal episodes.    Reviewed medication in detail with the patient.    Echocardiogram Results: 4/25/2024   1. Moderate left ventricular chamber enlargement. Severely decreased left ventricular systolic   function. Calculated left ventricular    ejection fraction (modified Ovalles technique) is 29 %. Generalized left ventricular   hypokinesis.    2. Normal right ventricular size and systolic function.    3. Findings consistent with increased left ventricular filling pressure.    4. Mild left atrial enlargement.    5. Moderate, functional mitral valve regurgitation.    6. Small pericardial effusion. No echocardiographic evidence of cardiac tamponade.    7. Dilated inferior vena cava with normal inspiratory collapse.    8. There were no prior studies available for comparison.    9. Awaiting callback from ordering provider.    Estimated EF: 25-30%     CT Chest:   1.  Negative for pulmonary emboli.   2.  Diffuse bronchial wall thickening with bilateral perihilar groundglass airspace disease suggests a bilateral infectious or inflammatory pneumonitis.   3.  Intralobular septal thickening as seen with pulmonary edema with bilateral small pleural effusions suggests CHF versus fluid overload.      Physical Examination  Review of Systems   Vitals: /72 (BP Location: Right arm, Patient Position: Sitting, Cuff Size: Adult Regular)   Pulse 70   Resp 16   Wt 54.4 kg (120 lb)   LMP  (LMP Unknown)   SpO2 100%   BMI 21.60 kg/m    BMI= Body mass index is 21.6 kg/m .  Wt Readings from Last 3 Encounters:   05/06/24 54.4 kg (120 lb)   05/02/24 56.8 kg (125 lb 4.8 oz)   08/18/23 58.6 kg (129 lb 1.6 oz)        Pleasant   ENT/Mouth: membranes moist, no oral lesions or  bleeding gums.      EYES:  no scleral icterus, normal conjunctivae       Chest/Lungs:   lungs are clear to auscultation, no rales or wheezing, no sternal scar, equal chest wall expansion    Cardiovascular:   Regular. Normal first and second heart sounds with no murmurs, rubs, or gallops; the carotid, radial and posterior tibial pulses are intact, Jugular venous pressure normal, no edema bilaterally    Abdomen:  no tenderness; bowel sounds are present   Extremities: no cyanosis or clubbing   Skin: no xanthelasma, warm.    Neurologic: normal  bilateral, no tremors     Psychiatric: alert and oriented x3, calm        Please refer above for cardiac ROS details.        Medical History  Surgical History Family History Social History   Past Medical History:   Diagnosis Date    Allergic rhinitis, cause unspecified     Allergic rhinitis    Methicillin susceptible Staphylococcus aureus in conditions classified elsewhere and of unspecified site 6/25/02    Unspecified asthma(493.90) 1/11/05    exacerbation of asthma    Unspecified conductive hearing loss     Unspecified otitis media childhood    Recurrent otitis     Past Surgical History:   Procedure Laterality Date    HC NASAL ENDOSCOPY, DIAGNOSTIC  4/7/08    HC TYMPANOPLASTY W/O MASTOID, INIT/REV W/O OSS CHAIN RECONST  10/16/06    LT    HC TYMPANOPLASTY W/O MASTOID, INIT/REV W/O OSS CHAIN RECONST  10/15/07    RT    ZZHC CREATE EARDRUM OPENING,GEN ANESTH  1999, 2001    Myringotomy w tubes    ZZHC CREATE EARDRUM OPENING,GEN ANESTH  4/7/08    RT     Family History   Problem Relation Age of Onset    Allergies Father         Tetanus and penicillin    Obesity Father     Psoriasis Father     Hypertension Maternal Grandmother     Arthritis Maternal Grandmother     Thyroid Disease Maternal Grandmother         Hypothyroidism    C.A.D. Maternal Grandfather     Hypertension Paternal Grandmother     Arthritis Paternal Grandmother     Obesity Paternal Grandmother     Gastrointestinal  Disease Paternal Grandfather         pocketing in intestines    Rheumatoid Arthritis Paternal Aunt         Social History     Socioeconomic History    Marital status:      Spouse name: Not on file    Number of children: Not on file    Years of education: Not on file    Highest education level: Not on file   Occupational History    Occupation: student   Tobacco Use    Smoking status: Never     Passive exposure: Never    Smokeless tobacco: Never    Tobacco comments:     second hand smoke in the home   Vaping Use    Vaping status: Never Used   Substance and Sexual Activity    Alcohol use: No    Drug use: No    Sexual activity: Never   Other Topics Concern     Service No    Blood Transfusions No    Caffeine Concern Yes     Comment: pop daily    Occupational Exposure Not Asked    Hobby Hazards Not Asked    Sleep Concern Not Asked    Stress Concern Not Asked    Weight Concern Not Asked    Special Diet Not Asked    Back Care Not Asked    Exercise No    Bike Helmet Not Asked    Seat Belt Not Asked    Self-Exams No   Social History Narrative    Not on file     Social Determinants of Health     Financial Resource Strain: Low Risk  (10/25/2023)    [facility]    Financial Resource Strain     Difficulty of Paying Living Expenses: 3     Difficulty of Paying Living Expenses: Not on file   Food Insecurity: No Food Insecurity (10/25/2023)    [facility], [facility]    Food Insecurity     Worried About Running Out of Food in the Last Year: 1   Transportation Needs: No Transportation Needs (10/25/2023)    [facility], [facility]    Transportation Needs     Lack of Transportation (Medical): 1   Physical Activity: Not on file   Stress: Not on file   Social Connections:  "Socially Integrated (10/25/2023)    Received from [facility], [facility]    Social Connections     Frequency of Communication with Friends and Family: 0   Interpersonal Safety: Not on file   Housing Stability: Low Risk  (10/25/2023)    [facility]    Housing Stability     Unable to Pay for Housing in the Last Year: 1           Medications  Allergies   Current Outpatient Medications   Medication Sig Dispense Refill    acetaminophen (TYLENOL) 325 MG tablet Take 325-650 mg by mouth every 6 hours as needed      carvedilol (COREG) 12.5 MG tablet Take 1 tablet (12.5 mg) by mouth 2 times daily (with meals) 180 tablet 1    cholecalciferol (VITAMIN D3) 25 mcg (1000 units) capsule Take 1 tablet by mouth daily      dapagliflozin (FARXIGA) 10 MG TABS tablet Take 1 tablet by mouth daily      furosemide (LASIX) 20 MG tablet Take 20 mg by mouth daily as needed      sacubitril-valsartan (ENTRESTO)  MG per tablet Take 1 tablet by mouth daily      spironolactone (ALDACTONE) 25 MG tablet Take 1 tablet by mouth every morning      albuterol (PROAIR HFA/PROVENTIL HFA/VENTOLIN HFA) 108 (90 Base) MCG/ACT inhaler Inhale 2 puffs into the lungs 4 times daily (Patient not taking: Reported on 5/6/2024)      VUMERITY 231 MG CPDR Take 2 capsules by mouth 2 times daily (Patient not taking: Reported on 5/6/2024) 120 capsule 11       Allergies   Allergen Reactions    Adhesive Tape Rash    Pollen Extract Itching          Lab Results    Chemistry/lipid CBC Cardiac Enzymes/BNP/TSH/INR   No results for input(s): \"CHOL\", \"HDL\", \"LDL\", \"TRIG\", \"CHOLHDLRATIO\" in the last 99938 hours.  No results for input(s): \"LDL\" in the last 99201 hours.  Recent Labs   Lab Test 05/02/24  1014      POTASSIUM 5.0   CHLORIDE 103   CO2 23   GLC 74   BUN 18.5   CR 0.68   GFRESTIMATED >90   RENETTA 10.3* " "    Recent Labs   Lab Test 05/02/24  1014 07/04/19  0939 07/03/19  0203   CR 0.68 0.52 0.64     No results for input(s): \"A1C\" in the last 30951 hours.       Recent Labs   Lab Test 05/02/24  1014   WBC 10.6   HGB 14.4   HCT 46.8   MCV 88   *     Recent Labs   Lab Test 05/02/24  1014 05/31/22  1104 08/04/20  1434   HGB 14.4 15.0 13.7    No results for input(s): \"TROPONINI\" in the last 23023 hours.  Recent Labs   Lab Test 05/02/24  1014   NTBNP 230     No results for input(s): \"TSH\" in the last 57476 hours.  Recent Labs   Lab Test 07/03/19  0616   INR 1.31*        Rich Corbett DO  Cardiologist     65 minutes spent by me on the date of the encounter doing chart review, history and exam, documentation and further activities per the note.  Extensive review of the chart.  Discussed case previously with primary care provider.    Thank you for allowing me to participate in the care of your patient.    Sincerely,   Rich Corbett DO   [facility]  cc:   Missy Ford MD  [address] [place] [id number]  "

## 2024-05-06 NOTE — PATIENT INSTRUCTIONS
Please contact direct nurses line Monday through Friday 8 AM to 5 PM @ [phone]    After-hours contact cardiology office at [phone].    Plan:   Continue current medications, would consider reducing Entresto if ongoing lightheadedness  Will obtain echocardiogram in 2 weeks  If ongoing difficulty with appetite would recommend stopping Farxiga, after echo

## 2024-05-17 ENCOUNTER — MYC MEDICAL ADVICE (OUTPATIENT)
Dept: CARDIOLOGY | Facility: CLINIC | Age: 29
End: 2024-05-17
Payer: COMMERCIAL

## 2024-05-17 DIAGNOSIS — I50.21 ACUTE SYSTOLIC CONGESTIVE HEART FAILURE (H): Primary | ICD-10-CM

## 2024-05-17 RX ORDER — SACUBITRIL AND VALSARTAN 49; 51 MG/1; MG/1
1 TABLET, FILM COATED ORAL 2 TIMES DAILY
Qty: 60 TABLET | Refills: 3 | Status: SHIPPED | OUTPATIENT
Start: 2024-05-17

## 2024-05-17 NOTE — TELEPHONE ENCOUNTER
Dr. Corbett, pt has a visit with [facility]ologist arranged for this Wednesday 5/23, Dr. Franco. Now under [facility] cardiology care, any need to keep this appointment as arranged? Please advise. HUNTERRN

## 2024-05-17 NOTE — TELEPHONE ENCOUNTER
PC to patient and review of My chart message.   Blood pressure recently checked 85/62 HR 74. Improved. Continued lightheadedness. Denies syncope/pre-syncope. Improves with movement, but definitely laying low, and moving slowly. At home alone with baby until family present this evening. Eating, drank 40 + ounces so far today. Denies chest pain, palpitations. Diarrhea and stomach issues continue every other day, nausea daily. Urinating without issues. Denies UTI symptoms. Discussion with patient purpose of MC messaging. Encouraged if more urgent concern, or issue call the clinic at [phone]. If non-urgent follow-up question call writer directly at [phone]. MC message also, but anticipate 1-3 days response time. Verbalized understanding. Informed patient will call Dr. Corbett to discuss and return call. Buddy HARRISON  ____________________________________________________________________________________    Discussion with MAT. Reported current BP reading. Reviewed MC message and reported BP readings.     Plan of care:  HOLD lasix and Farxiga until labs completed, resulted and further recommendations. Monitor if nausea improves while holding Farxiga. Carvedilol continue same regimen and dose. Reduce Entresto to 49/51, by mouth twice daily. Labs on Monday 5/20 BNP, BMP to be completed. CMM,Rn     PC to patient and review of response from provider. Instructions reviewed. Verbalized understanding. Will monitor if nausea improves while holding Farxiga. Due for labs, will call to arrange lab appt on Monday 5/20, and have BMP, BNP checked. New Rx for Entresto sent to verified pharmacy location.   No further questions. Discussion with patient, resources if feeling worse over the weekend.   Appreciative of the call. CMM,Rn

## 2024-05-20 ENCOUNTER — LAB (OUTPATIENT)
Dept: LAB | Facility: CLINIC | Age: 29
End: 2024-05-20
Payer: COMMERCIAL

## 2024-05-20 DIAGNOSIS — Z13.220 SCREENING FOR HYPERLIPIDEMIA: Primary | ICD-10-CM

## 2024-05-20 DIAGNOSIS — D75.839 THROMBOCYTOSIS: ICD-10-CM

## 2024-05-20 DIAGNOSIS — Z11.59 NEED FOR HEPATITIS C SCREENING TEST: ICD-10-CM

## 2024-05-20 DIAGNOSIS — I50.22 CHRONIC SYSTOLIC HEART FAILURE (H): ICD-10-CM

## 2024-05-20 DIAGNOSIS — I50.21 ACUTE SYSTOLIC CONGESTIVE HEART FAILURE (H): ICD-10-CM

## 2024-05-20 LAB
ANION GAP SERPL CALCULATED.3IONS-SCNC: 10 MMOL/L (ref 7–15)
BASOPHILS # BLD AUTO: 0 10E3/UL (ref 0–0.2)
BASOPHILS NFR BLD AUTO: 1 %
BUN SERPL-MCNC: 12.7 MG/DL (ref 6–20)
CALCIUM SERPL-MCNC: 9.5 MG/DL (ref 8.6–10)
CHLORIDE SERPL-SCNC: 105 MMOL/L (ref 98–107)
CHOLEST SERPL-MCNC: 246 MG/DL
CREAT SERPL-MCNC: 0.76 MG/DL (ref 0.51–0.95)
DEPRECATED HCO3 PLAS-SCNC: 25 MMOL/L (ref 22–29)
EGFRCR SERPLBLD CKD-EPI 2021: >90 ML/MIN/1.73M2
EOSINOPHIL # BLD AUTO: 0.1 10E3/UL (ref 0–0.7)
EOSINOPHIL NFR BLD AUTO: 2 %
ERYTHROCYTE [DISTWIDTH] IN BLOOD BY AUTOMATED COUNT: 13.1 % (ref 10–15)
FASTING STATUS PATIENT QL REPORTED: YES
FASTING STATUS PATIENT QL REPORTED: YES
GLUCOSE SERPL-MCNC: 72 MG/DL (ref 70–99)
HCT VFR BLD AUTO: 43.2 % (ref 35–47)
HCV AB SERPL QL IA: NONREACTIVE
HDLC SERPL-MCNC: 54 MG/DL
HGB BLD-MCNC: 13.3 G/DL (ref 11.7–15.7)
IMM GRANULOCYTES # BLD: 0 10E3/UL
IMM GRANULOCYTES NFR BLD: 0 %
LDLC SERPL CALC-MCNC: 174 MG/DL
LYMPHOCYTES # BLD AUTO: 2.3 10E3/UL (ref 0.8–5.3)
LYMPHOCYTES NFR BLD AUTO: 36 %
MCH RBC QN AUTO: 26.5 PG (ref 26.5–33)
MCHC RBC AUTO-ENTMCNC: 30.8 G/DL (ref 31.5–36.5)
MCV RBC AUTO: 86 FL (ref 78–100)
MONOCYTES # BLD AUTO: 0.4 10E3/UL (ref 0–1.3)
MONOCYTES NFR BLD AUTO: 6 %
NEUTROPHILS # BLD AUTO: 3.4 10E3/UL (ref 1.6–8.3)
NEUTROPHILS NFR BLD AUTO: 55 %
NONHDLC SERPL-MCNC: 192 MG/DL
NT-PROBNP SERPL-MCNC: 108 PG/ML (ref 0–450)
PLATELET # BLD AUTO: 345 10E3/UL (ref 150–450)
POTASSIUM SERPL-SCNC: 4.7 MMOL/L (ref 3.4–5.3)
RBC # BLD AUTO: 5.01 10E6/UL (ref 3.8–5.2)
SODIUM SERPL-SCNC: 140 MMOL/L (ref 135–145)
TRIGL SERPL-MCNC: 90 MG/DL
WBC # BLD AUTO: 6.2 10E3/UL (ref 4–11)

## 2024-05-20 PROCEDURE — 36415 COLL VENOUS BLD VENIPUNCTURE: CPT

## 2024-05-20 PROCEDURE — 83880 ASSAY OF NATRIURETIC PEPTIDE: CPT

## 2024-05-20 PROCEDURE — 86803 HEPATITIS C AB TEST: CPT

## 2024-05-20 PROCEDURE — 80061 LIPID PANEL: CPT

## 2024-05-20 PROCEDURE — 80048 BASIC METABOLIC PNL TOTAL CA: CPT

## 2024-05-20 PROCEDURE — 85025 COMPLETE CBC W/AUTO DIFF WBC: CPT | Mod: QW

## 2024-05-20 NOTE — TELEPHONE ENCOUNTER
Dr Liang is just wondering if there is any need to continue with cardiologist from [facility] as she now is under your care? Has an appt scheduled with [employer] provider Dr. Franco? Any need for this appt or agreeable she can cancel? HUNTER,Rn

## 2024-05-21 NOTE — TELEPHONE ENCOUNTER
Rich luis, DO  You13 hours ago (8:59 PM)     MT  She can cancel     You  Rich Corbett, DO23 hours ago (11:09 AM)     CM  Dr. Corbett just asking for clarity. Unsure if the [facility] provider is anyone she needs to continue care with? HUNTER,RN

## 2024-05-21 NOTE — TELEPHONE ENCOUNTER
message sent to  follow-up/inform. CMM,Rn   ===View-only below this line===  ----- Message -----  From: Rich Corbett DO  Sent: 5/21/2024  11:23 AM CDT  To: Jordan Savage RN  Subject: FW: Blood Pressures                              Yes continue patient on current medications.  She should remain off Farxiga.  Will await echo results in May.

## 2024-05-21 NOTE — TELEPHONE ENCOUNTER
Dr. Corbett now that blood work returned, and stable. Continue off Farxiga and furosemide until echo completed 5/23/24, and then further recommendations from there? Please advise. HUNTER,RN

## 2024-05-23 ENCOUNTER — ANCILLARY PROCEDURE (OUTPATIENT)
Dept: CARDIOLOGY | Facility: CLINIC | Age: 29
End: 2024-05-23
Attending: INTERNAL MEDICINE
Payer: COMMERCIAL

## 2024-05-23 DIAGNOSIS — I50.21 ACUTE SYSTOLIC CONGESTIVE HEART FAILURE (H): ICD-10-CM

## 2024-05-23 LAB — LVEF ECHO: NORMAL

## 2024-05-23 PROCEDURE — 93306 TTE W/DOPPLER COMPLETE: CPT | Performed by: INTERNAL MEDICINE

## 2024-06-23 ENCOUNTER — HEALTH MAINTENANCE LETTER (OUTPATIENT)
Age: 29
End: 2024-06-23

## 2024-07-09 ENCOUNTER — OFFICE VISIT (OUTPATIENT)
Dept: CARDIOLOGY | Facility: CLINIC | Age: 29
End: 2024-07-09
Attending: INTERNAL MEDICINE
Payer: COMMERCIAL

## 2024-07-09 ENCOUNTER — DOCUMENTATION ONLY (OUTPATIENT)
Dept: NEUROLOGY | Facility: CLINIC | Age: 29
End: 2024-07-09

## 2024-07-09 ENCOUNTER — OFFICE VISIT (OUTPATIENT)
Dept: NEUROLOGY | Facility: CLINIC | Age: 29
End: 2024-07-09
Attending: PSYCHIATRY & NEUROLOGY
Payer: COMMERCIAL

## 2024-07-09 VITALS
SYSTOLIC BLOOD PRESSURE: 94 MMHG | WEIGHT: 123.2 LBS | BODY MASS INDEX: 22.67 KG/M2 | DIASTOLIC BLOOD PRESSURE: 44 MMHG | HEART RATE: 64 BPM | RESPIRATION RATE: 12 BRPM | HEIGHT: 62 IN

## 2024-07-09 VITALS
OXYGEN SATURATION: 99 % | WEIGHT: 119.3 LBS | HEART RATE: 66 BPM | SYSTOLIC BLOOD PRESSURE: 105 MMHG | BODY MASS INDEX: 21.47 KG/M2 | DIASTOLIC BLOOD PRESSURE: 66 MMHG

## 2024-07-09 DIAGNOSIS — G35 MULTIPLE SCLEROSIS (H): Primary | ICD-10-CM

## 2024-07-09 DIAGNOSIS — I50.21 ACUTE SYSTOLIC CONGESTIVE HEART FAILURE (H): ICD-10-CM

## 2024-07-09 LAB
ANION GAP SERPL CALCULATED.3IONS-SCNC: 9 MMOL/L (ref 7–15)
BUN SERPL-MCNC: 12.1 MG/DL (ref 6–20)
CALCIUM SERPL-MCNC: 9.2 MG/DL (ref 8.6–10)
CHLORIDE SERPL-SCNC: 105 MMOL/L (ref 98–107)
CREAT SERPL-MCNC: 0.58 MG/DL (ref 0.51–0.95)
DEPRECATED HCO3 PLAS-SCNC: 27 MMOL/L (ref 22–29)
EGFRCR SERPLBLD CKD-EPI 2021: >90 ML/MIN/1.73M2
GLUCOSE SERPL-MCNC: 107 MG/DL (ref 70–99)
POTASSIUM SERPL-SCNC: 3.9 MMOL/L (ref 3.4–5.3)
SODIUM SERPL-SCNC: 141 MMOL/L (ref 135–145)

## 2024-07-09 PROCEDURE — 80048 BASIC METABOLIC PNL TOTAL CA: CPT | Performed by: INTERNAL MEDICINE

## 2024-07-09 PROCEDURE — 36415 COLL VENOUS BLD VENIPUNCTURE: CPT | Performed by: INTERNAL MEDICINE

## 2024-07-09 PROCEDURE — 99213 OFFICE O/P EST LOW 20 MIN: CPT | Performed by: PSYCHIATRY & NEUROLOGY

## 2024-07-09 PROCEDURE — G2211 COMPLEX E/M VISIT ADD ON: HCPCS | Performed by: INTERNAL MEDICINE

## 2024-07-09 PROCEDURE — 99214 OFFICE O/P EST MOD 30 MIN: CPT | Performed by: PSYCHIATRY & NEUROLOGY

## 2024-07-09 PROCEDURE — 99214 OFFICE O/P EST MOD 30 MIN: CPT | Performed by: INTERNAL MEDICINE

## 2024-07-09 RX ORDER — CARVEDILOL 6.25 MG/1
6.25 TABLET ORAL 2 TIMES DAILY WITH MEALS
Qty: 60 TABLET | Refills: 3 | Status: SHIPPED | OUTPATIENT
Start: 2024-07-09

## 2024-07-09 RX ORDER — SACUBITRIL AND VALSARTAN 24; 26 MG/1; MG/1
1 TABLET, FILM COATED ORAL 2 TIMES DAILY
Qty: 60 TABLET | Refills: 3 | Status: SHIPPED | OUTPATIENT
Start: 2024-07-09

## 2024-07-09 ASSESSMENT — PAIN SCALES - GENERAL: PAINLEVEL: NO PAIN (0)

## 2024-07-09 NOTE — PROGRESS NOTES
HEART CARE ENCOUNTER CONSULTATON NOTE      [facility]  [phone]      Assessment/Recommendations   Assessment:   Acute systolic congestive heart failure with reduced ejection fraction, moderate left ventricular enlargement severe reduction in systolic function.  Ejection fraction 29%.   Recent repeat echocardiogram demonstrated significant improvement in LV size, valve function and improving ejection fraction.  Postpartum cardiomyopathy  Moderate functional mitral digitation  Preeclampsia, severe  Multiple sclerosis  Asthma    Plan:  Reduce carvedilol 6.25 mg twice daily  REduce Entresto.    Remain off farxiga 10 mg daily  Continue spironolactone 25 mg daily  5.   BMP today.   6. Echo in 2 months .       History of Present Illness/Subjective    HPI: Janneth Hirsch is a 28 year old female with systolic congestive heart failure related to postpartum cardiomyopathy.    Patient continues to do very well.  Currently denies any dyspnea exertion continues to have intermittent episodes of lightheadedness.  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  Overall doing well.  Repeat echo in a couple months.  Reduced medications due to hypotension and lightheadedness with standing.  Will check a basic metabolic panel today to assess her kidney function and potassium levels.    Echocardiogram Results: 4/25/2024   1. Moderate left ventricular chamber enlargement. Severely decreased left ventricular systolic   function. Calculated left ventricular    ejection fraction (modified Ovalles technique) is 29 %. Generalized left ventricular   hypokinesis.    2. Normal right ventricular size and systolic function.    3. Findings consistent with increased left ventricular filling pressure.    4. Mild left atrial enlargement.    5. Moderate, functional mitral valve regurgitation.    6. Small pericardial effusion. No echocardiographic evidence of cardiac tamponade.    7. Dilated inferior vena cava with normal inspiratory  "collapse.    8. There were no prior studies available for comparison.    9. Awaiting callback from ordering provider.    Estimated EF: 25-30%     CT Chest:   1.  Negative for pulmonary emboli.   2.  Diffuse bronchial wall thickening with bilateral perihilar groundglass airspace disease suggests a bilateral infectious or inflammatory pneumonitis.   3.  Intralobular septal thickening as seen with pulmonary edema with bilateral small pleural effusions suggests CHF versus fluid overload.      Physical Examination  Review of Systems   Vitals: BP 94/44 (BP Location: Right arm, Patient Position: Sitting, Cuff Size: Adult Regular)   Pulse 64   Resp 12   Ht 1.575 m (5' 2\")   Wt 55.9 kg (123 lb 3.2 oz)   BMI 22.53 kg/m    BMI= Body mass index is 22.53 kg/m .  Wt Readings from Last 3 Encounters:   07/09/24 55.9 kg (123 lb 3.2 oz)   07/09/24 54.1 kg (119 lb 4.8 oz)   05/06/24 54.4 kg (120 lb)        Pleasant   ENT/Mouth: membranes moist, no oral lesions or bleeding gums.      EYES:  no scleral icterus, normal conjunctivae       Chest/Lungs:   lungs are clear to auscultation, no rales or wheezing, no sternal scar, equal chest wall expansion    Cardiovascular:   Regular. Normal first and second heart sounds with no murmurs, rubs, or gallops; the carotid, radial and posterior tibial pulses are intact, Jugular venous pressure normal, no edema bilaterally    Abdomen:  no tenderness; bowel sounds are present   Extremities: no cyanosis or clubbing   Skin: no xanthelasma, warm.    Neurologic: normal  bilateral, no tremors     Psychiatric: alert and oriented x3, calm        Please refer above for cardiac ROS details.        Medical History  Surgical History Family History Social History   Past Medical History:   Diagnosis Date    Allergic rhinitis, cause unspecified     Allergic rhinitis    Methicillin susceptible Staphylococcus aureus in conditions classified elsewhere and of unspecified site 6/25/02    Unspecified " asthma(493.90) 1/11/05    exacerbation of asthma    Unspecified conductive hearing loss     Unspecified otitis media childhood    Recurrent otitis     Past Surgical History:   Procedure Laterality Date    HC NASAL ENDOSCOPY, DIAGNOSTIC  4/7/08    HC TYMPANOPLASTY W/O MASTOID, INIT/REV W/O OSS CHAIN RECONST  10/16/06    LT    HC TYMPANOPLASTY W/O MASTOID, INIT/REV W/O OSS CHAIN RECONST  10/15/07    RT    ZZHC CREATE EARDRUM OPENING,GEN ANESTH  1999, 2001    Myringotomy w tubes    ZZHC CREATE EARDRUM OPENING,GEN ANESTH  4/7/08    RT     Family History   Problem Relation Age of Onset    Allergies Father         Tetanus and penicillin    Obesity Father     Psoriasis Father     Hypertension Maternal Grandmother     Arthritis Maternal Grandmother     Thyroid Disease Maternal Grandmother         Hypothyroidism    C.A.D. Maternal Grandfather     Hypertension Paternal Grandmother     Arthritis Paternal Grandmother     Obesity Paternal Grandmother     Gastrointestinal Disease Paternal Grandfather         pocketing in intestines    Rheumatoid Arthritis Paternal Aunt         Social History     Socioeconomic History    Marital status:      Spouse name: Not on file    Number of children: Not on file    Years of education: Not on file    Highest education level: Not on file   Occupational History    Occupation: student   Tobacco Use    Smoking status: Never     Passive exposure: Never    Smokeless tobacco: Never    Tobacco comments:     second hand smoke in the home   Vaping Use    Vaping status: Never Used   Substance and Sexual Activity    Alcohol use: No    Drug use: No    Sexual activity: Never   Other Topics Concern     Service No    Blood Transfusions No    Caffeine Concern Yes     Comment: pop daily    Occupational Exposure Not Asked    Hobby Hazards Not Asked    Sleep Concern Not Asked    Stress Concern Not Asked    Weight Concern Not Asked    Special Diet Not Asked    Back Care Not Asked    Exercise No     Bike Helmet Not Asked    Seat Belt Not Asked    Self-Exams No   Social History Narrative    Not on file     Social Determinants of Health     Financial Resource Strain: Low Risk  (10/25/2023)    [facility]    Financial Resource Strain     Difficulty of Paying Living Expenses: 3     Difficulty of Paying Living Expenses: Not on file   Food Insecurity: No Food Insecurity (10/25/2023)    Received from [facility], [facility]    Food Insecurity     Worried About Running Out of Food in the Last Year: 1   Transportation Needs: No Transportation Needs (10/25/2023)    Received from [facility], [facility]    Transportation Needs     Lack of Transportation (Medical): 1   Physical Activity: Not on file   Stress: Not on file   Social Connections: Socially Integrated (10/25/2023)    Received from [facility]    Social Connections     Frequency of Communication with Friends and Family: 0   Interpersonal Safety: Not on file   Housing Stability: Low Risk  (10/25/2023)    [facility]    Housing Stability     Unable to Pay for Housing in the Last Year: 1           Medications  Allergies   Current Outpatient Medications   Medication Sig Dispense Refill    acetaminophen (TYLENOL) 325 MG tablet Take 325-650 mg by mouth every 6 hours as needed      albuterol (PROAIR HFA/PROVENTIL HFA/VENTOLIN HFA) 108 (90 Base) MCG/ACT inhaler Inhale 2 puffs into the lungs 4 times daily      carvedilol (COREG) 12.5 MG tablet Take 1 tablet (12.5 mg) by mouth 2 times daily (with meals) 180 tablet 1    cholecalciferol (VITAMIN D3) 25 mcg (1000 units) capsule Take 1  "tablet by mouth daily      levonorgestrel (MIRENA) 52 MG (20 mcg/day) IUD 1 Device by Intrauterine route once      sacubitril-valsartan (ENTRESTO) 49-51 MG per tablet Take 1 tablet by mouth 2 times daily 60 tablet 3    spironolactone (ALDACTONE) 25 MG tablet Take 1 tablet by mouth every morning      VUMERITY 231 MG CPDR Take 2 capsules by mouth 2 times daily 120 capsule 11    dapagliflozin (FARXIGA) 10 MG TABS tablet Take 1 tablet by mouth daily (Patient not taking: Reported on 7/9/2024)      furosemide (LASIX) 20 MG tablet Take 20 mg by mouth daily as needed (Patient not taking: Reported on 7/9/2024)         Allergies   Allergen Reactions    Adhesive Tape Rash    Pollen Extract Itching          Lab Results    Chemistry/lipid CBC Cardiac Enzymes/BNP/TSH/INR   No results for input(s): \"CHOL\", \"HDL\", \"LDL\", \"TRIG\", \"CHOLHDLRATIO\" in the last 59764 hours.  No results for input(s): \"LDL\" in the last 65201 hours.  Recent Labs   Lab Test 05/02/24  1014      POTASSIUM 5.0   CHLORIDE 103   CO2 23   GLC 74   BUN 18.5   CR 0.68   GFRESTIMATED >90   RENETTA 10.3*     Recent Labs   Lab Test 05/02/24  1014 07/04/19  0939 07/03/19  0203   CR 0.68 0.52 0.64     No results for input(s): \"A1C\" in the last 33449 hours.       Recent Labs   Lab Test 05/02/24  1014   WBC 10.6   HGB 14.4   HCT 46.8   MCV 88   *     Recent Labs   Lab Test 05/02/24  1014 05/31/22  1104 08/04/20  1434   HGB 14.4 15.0 13.7    No results for input(s): \"TROPONINI\" in the last 81456 hours.  Recent Labs   Lab Test 05/02/24  1014   NTBNP 230     No results for input(s): \"TSH\" in the last 60259 hours.  Recent Labs   Lab Test 07/03/19  0616   INR 1.31*        Rich Corbett DO  Cardiologist     Today's clinic visit entailed:  38 minutes spent by me on the date of the encounter doing chart review, history and exam, documentation and further activities per the note  The longitudinal plan of care for the diagnosis(es)/condition(s) as documented were " addressed during this visit. Due to the added complexity in care, I will continue to support Janneth in the subsequent management and with ongoing continuity of care.  Repeat echocardiogram planned in 2 months.  Further titration medications may be necessary.

## 2024-07-09 NOTE — NURSING NOTE
Chief Complaint   Patient presents with    MS    RECHECK     Annual follow up      Vitals were taken and medications were reconciled.   Shaan Contreras, EMT  10:55 AM

## 2024-07-09 NOTE — PATIENT INSTRUCTIONS
Please contact direct nurses line Monday through Friday 8 AM to 5 PM @ [phone]    After-hours contact cardiology office at [phone].    Plan:     Continue current medications

## 2024-07-09 NOTE — Clinical Note
7/9/2024       RE: Janneth Hirsch  [address]  [place] [address]     Dear Colleague,    Thank you for referring your patient, Janneth Hirsch, to the [facility] [place] [facility]. Please see a copy of my visit note below.    ID: Janneth Hirsch is a 28-year-old woman who I follow for multiple sclerosis.  She returns for routine follow-up.  I last saw her in May 2023.    HPI: Janneth gave birth to her daughter in April of this year.  The pregnancy was complicated by preeclampsia, and then Janneth developed postpartum cardiomyopathy.  She remains on treatment for that, tells me that she is expected to recover from that but there is an approximately 30% risk that it could recur with subsequent pregnancies.  She is back taking the Vumerity again, which she had stopped while pursuing pregnancy.  She had a few days of flushing when restarting it but basically none at this point.  She has had no new neurologic symptoms suggestive of interim relapse or disease progression, and really has no residual symptoms at this point from MS.    Past Medical History:   Diagnosis Date    Allergic rhinitis, cause unspecified     Allergic rhinitis    Methicillin susceptible Staphylococcus aureus in conditions classified elsewhere and of unspecified site 6/25/02    Unspecified asthma(493.90) 1/11/05    exacerbation of asthma    Unspecified conductive hearing loss     Unspecified otitis media childhood    Recurrent otitis    MS      Current Outpatient Medications:     acetaminophen (TYLENOL) 325 MG tablet, Take 325-650 mg by mouth every 6 hours as needed, Disp: , Rfl:     cholecalciferol (VITAMIN D3) 25 mcg (1000 units) capsule, Take 1 tablet by mouth daily, Disp: , Rfl:     sacubitril-valsartan (ENTRESTO) 49-51 MG per tablet, Take 1 tablet by mouth 2 times daily, Disp: 60 tablet, Rfl: 3    spironolactone (ALDACTONE) 25 MG tablet, Take 1 tablet by mouth  every morning, Disp: , Rfl:     VUMERITY 231 MG CPDR, Take 2 capsules by mouth 2 times daily, Disp: 120 capsule, Rfl: 11    albuterol (PROAIR HFA/PROVENTIL HFA/VENTOLIN HFA) 108 (90 Base) MCG/ACT inhaler, Inhale 2 puffs into the lungs 4 times daily, Disp: , Rfl:     carvedilol (COREG) 6.25 MG tablet, Take 1 tablet (6.25 mg) by mouth 2 times daily (with meals), Disp: 60 tablet, Rfl: 3    dapagliflozin (FARXIGA) 10 MG TABS tablet, Take 1 tablet by mouth daily (Patient not taking: Reported on 7/9/2024), Disp: , Rfl:     furosemide (LASIX) 20 MG tablet, Take 20 mg by mouth daily as needed (Patient not taking: Reported on 7/9/2024), Disp: , Rfl:     levonorgestrel (MIRENA) 52 MG (20 mcg/day) IUD, 1 Device by Intrauterine route once, Disp: , Rfl:     sacubitril-valsartan (ENTRESTO) 24-26 MG per tablet, Take 1 tablet by mouth 2 times daily, Disp: 60 tablet, Rfl: 3     Exam: She is alert and oriented.  Affect is bright and language functions are normal.  Cranial nerves are unremarkable.  Muscle bulk and tone strength and dexterity are normal in the arms and legs.  Light touch is intact in all 4 limbs.  Reflexes are normal and symmetric at the elbows and knees.  Gait is narrow-based and stable with normal tandem walk and negative Romberg.    Impression: Relapsing remitting multiple sclerosis, stable on Vumerity.  I spent 24 minutes on her care on the date of service including chart review and face-to-face time.  We discussed MRI surveillance, we will go ahead and repeat MRIs now to establish a new baseline since she has been off treatment.    Plan: MRI brain and cervical, which can be done in [place] at her convenience.  Follow-up in 1 year.    This note was completed in part using voice-recognition software, and some typographic errors may be present as a result.       Again, thank you for allowing me to participate in the care of your patient.      Sincerely,    Lewis Dee MD

## 2024-07-09 NOTE — LETTER
7/9/2024    Missy Ford MD  [address] [facility] [address]    RE: Janneth Hirsch       Dear Colleague,     I had the pleasure of seeing Janneth Hirsch in the [facility].    HEART CARE ENCOUNTER CONSULTATON NOTE      [facility]  [phone]      Assessment/Recommendations   Assessment:   Acute systolic congestive heart failure with reduced ejection fraction, moderate left ventricular enlargement severe reduction in systolic function.  Ejection fraction 29%.   Recent repeat echocardiogram demonstrated significant improvement in LV size, valve function and improving ejection fraction.  Postpartum cardiomyopathy  Moderate functional mitral digitation  Preeclampsia, severe  Multiple sclerosis  Asthma    Plan:  Reduce carvedilol 6.25 mg twice daily  REduce Entresto.    Remain off farxiga 10 mg daily  Continue spironolactone 25 mg daily  5.   BMP today.   6. Echo in 2 months .       History of Present Illness/Subjective    HPI: Janneth Hirsch is a 28 year old female with systolic congestive heart failure related to postpartum cardiomyopathy.    Patient continues to do very well.  Currently denies any dyspnea exertion continues to have intermittent episodes of lightheadedness.  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  Overall doing well.  Repeat echo in a couple months.  Reduced medications due to hypotension and lightheadedness with standing.  Will check a basic metabolic panel today to assess her kidney function and potassium levels.    Echocardiogram Results: 4/25/2024   1. Moderate left ventricular chamber enlargement. Severely decreased left ventricular systolic   function. Calculated left ventricular    ejection fraction (modified Ovalles technique) is 29 %. Generalized left ventricular   hypokinesis.    2. Normal right ventricular size and systolic function.    3. Findings consistent with increased left ventricular filling pressure.    4. Mild left atrial  "enlargement.    5. Moderate, functional mitral valve regurgitation.    6. Small pericardial effusion. No echocardiographic evidence of cardiac tamponade.    7. Dilated inferior vena cava with normal inspiratory collapse.    8. There were no prior studies available for comparison.    9. Awaiting callback from ordering provider.    Estimated EF: 25-30%     CT Chest:   1.  Negative for pulmonary emboli.   2.  Diffuse bronchial wall thickening with bilateral perihilar groundglass airspace disease suggests a bilateral infectious or inflammatory pneumonitis.   3.  Intralobular septal thickening as seen with pulmonary edema with bilateral small pleural effusions suggests CHF versus fluid overload.      Physical Examination  Review of Systems   Vitals: BP 94/44 (BP Location: Right arm, Patient Position: Sitting, Cuff Size: Adult Regular)   Pulse 64   Resp 12   Ht 1.575 m (5' 2\")   Wt 55.9 kg (123 lb 3.2 oz)   BMI 22.53 kg/m    BMI= Body mass index is 22.53 kg/m .  Wt Readings from Last 3 Encounters:   07/09/24 55.9 kg (123 lb 3.2 oz)   07/09/24 54.1 kg (119 lb 4.8 oz)   05/06/24 54.4 kg (120 lb)        Pleasant   ENT/Mouth: membranes moist, no oral lesions or bleeding gums.      EYES:  no scleral icterus, normal conjunctivae       Chest/Lungs:   lungs are clear to auscultation, no rales or wheezing, no sternal scar, equal chest wall expansion    Cardiovascular:   Regular. Normal first and second heart sounds with no murmurs, rubs, or gallops; the carotid, radial and posterior tibial pulses are intact, Jugular venous pressure normal, no edema bilaterally    Abdomen:  no tenderness; bowel sounds are present   Extremities: no cyanosis or clubbing   Skin: no xanthelasma, warm.    Neurologic: normal  bilateral, no tremors     Psychiatric: alert and oriented x3, calm        Please refer above for cardiac ROS details.        Medical History  Surgical History Family History Social History   Past Medical History: "   Diagnosis Date    Allergic rhinitis, cause unspecified     Allergic rhinitis    Methicillin susceptible Staphylococcus aureus in conditions classified elsewhere and of unspecified site 6/25/02    Unspecified asthma(493.90) 1/11/05    exacerbation of asthma    Unspecified conductive hearing loss     Unspecified otitis media childhood    Recurrent otitis     Past Surgical History:   Procedure Laterality Date    HC NASAL ENDOSCOPY, DIAGNOSTIC  4/7/08    HC TYMPANOPLASTY W/O MASTOID, INIT/REV W/O OSS CHAIN RECONST  10/16/06    LT    HC TYMPANOPLASTY W/O MASTOID, INIT/REV W/O OSS CHAIN RECONST  10/15/07    RT    ZZHC CREATE EARDRUM OPENING,GEN ANESTH  1999, 2001    Myringotomy w tubes    ZZHC CREATE EARDRUM OPENING,GEN ANESTH  4/7/08    RT     Family History   Problem Relation Age of Onset    Allergies Father         Tetanus and penicillin    Obesity Father     Psoriasis Father     Hypertension Maternal Grandmother     Arthritis Maternal Grandmother     Thyroid Disease Maternal Grandmother         Hypothyroidism    C.A.D. Maternal Grandfather     Hypertension Paternal Grandmother     Arthritis Paternal Grandmother     Obesity Paternal Grandmother     Gastrointestinal Disease Paternal Grandfather         pocketing in intestines    Rheumatoid Arthritis Paternal Aunt         Social History     Socioeconomic History    Marital status:      Spouse name: Not on file    Number of children: Not on file    Years of education: Not on file    Highest education level: Not on file   Occupational History    Occupation: student   Tobacco Use    Smoking status: Never     Passive exposure: Never    Smokeless tobacco: Never    Tobacco comments:     second hand smoke in the home   Vaping Use    Vaping status: Never Used   Substance and Sexual Activity    Alcohol use: No    Drug use: No    Sexual activity: Never   Other Topics Concern     Service No    Blood Transfusions No    Caffeine Concern Yes     Comment: pop daily     Occupational Exposure Not Asked    Hobby Hazards Not Asked    Sleep Concern Not Asked    Stress Concern Not Asked    Weight Concern Not Asked    Special Diet Not Asked    Back Care Not Asked    Exercise No    Bike Helmet Not Asked    Seat Belt Not Asked    Self-Exams No   Social History Narrative    Not on file     Social Determinants of Health     Financial Resource Strain: Low Risk  (10/25/2023)    Received from [facility], [facility]    Financial Resource Strain     Difficulty of Paying Living Expenses: 3     Difficulty of Paying Living Expenses: Not on file   Food Insecurity: No Food Insecurity (10/25/2023)    Received from [facility] [employer] [facility], [facility]    Food Insecurity     Worried About Running Out of Food in the Last Year: 1   Transportation Needs: No Transportation Needs (10/25/2023)    Received from [facility] [employer] [facility], [facility]    Transportation Needs     Lack of Transportation (Medical): 1   Physical Activity: Not on file   Stress: Not on file   Social Connections: Socially Integrated (10/25/2023)    Received from [facility] [employer] [facility], [facility]    Social Connections     Frequency of Communication with Friends and Family: 0   Interpersonal Safety: Not on file   Housing Stability: Low Risk  (10/25/2023)    [facility] [employer] [facility]    Housing Stability     Unable to Pay for Housing in the Last Year: 1           Medications  Allergies   Current Outpatient Medications   Medication Sig Dispense Refill    acetaminophen (TYLENOL) 325 MG tablet Take 325-650 mg by mouth every 6 hours as needed      albuterol (PROAIR HFA/PROVENTIL HFA/VENTOLIN HFA) 108 (90 Base) MCG/ACT inhaler Inhale 2  "puffs into the lungs 4 times daily      carvedilol (COREG) 12.5 MG tablet Take 1 tablet (12.5 mg) by mouth 2 times daily (with meals) 180 tablet 1    cholecalciferol (VITAMIN D3) 25 mcg (1000 units) capsule Take 1 tablet by mouth daily      levonorgestrel (MIRENA) 52 MG (20 mcg/day) IUD 1 Device by Intrauterine route once      sacubitril-valsartan (ENTRESTO) 49-51 MG per tablet Take 1 tablet by mouth 2 times daily 60 tablet 3    spironolactone (ALDACTONE) 25 MG tablet Take 1 tablet by mouth every morning      VUMERITY 231 MG CPDR Take 2 capsules by mouth 2 times daily 120 capsule 11    dapagliflozin (FARXIGA) 10 MG TABS tablet Take 1 tablet by mouth daily (Patient not taking: Reported on 7/9/2024)      furosemide (LASIX) 20 MG tablet Take 20 mg by mouth daily as needed (Patient not taking: Reported on 7/9/2024)         Allergies   Allergen Reactions    Adhesive Tape Rash    Pollen Extract Itching          Lab Results    Chemistry/lipid CBC Cardiac Enzymes/BNP/TSH/INR   No results for input(s): \"CHOL\", \"HDL\", \"LDL\", \"TRIG\", \"CHOLHDLRATIO\" in the last 50832 hours.  No results for input(s): \"LDL\" in the last 48098 hours.  Recent Labs   Lab Test 05/02/24  1014      POTASSIUM 5.0   CHLORIDE 103   CO2 23   GLC 74   BUN 18.5   CR 0.68   GFRESTIMATED >90   RENETTA 10.3*     Recent Labs   Lab Test 05/02/24  1014 07/04/19  0939 07/03/19  0203   CR 0.68 0.52 0.64     No results for input(s): \"A1C\" in the last 99512 hours.       Recent Labs   Lab Test 05/02/24  1014   WBC 10.6   HGB 14.4   HCT 46.8   MCV 88   *     Recent Labs   Lab Test 05/02/24  1014 05/31/22  1104 08/04/20  1434   HGB 14.4 15.0 13.7    No results for input(s): \"TROPONINI\" in the last 22577 hours.  Recent Labs   Lab Test 05/02/24  1014   NTBNP 230     No results for input(s): \"TSH\" in the last 66463 hours.  Recent Labs   Lab Test 07/03/19  0616   INR 1.31*        Rich Corbett DO  Cardiologist     Today's clinic visit entailed:  38 minutes " spent by me on the date of the encounter doing chart review, history and exam, documentation and further activities per the note  The longitudinal plan of care for the diagnosis(es)/condition(s) as documented were addressed during this visit. Due to the added complexity in care, I will continue to support Janneth in the subsequent management and with ongoing continuity of care.  Repeat echocardiogram planned in 2 months.  Further titration medications may be necessary.    Thank you for allowing me to participate in the care of your patient.      Sincerely,   Rich Corbett DO   [facility]  cc:   Rich Corbett DO  [address] [place] [address]

## 2024-07-09 NOTE — PROGRESS NOTES
Orders for brain and cervical MRI's have been printed and faxed to [facility] [phone].  Shaan Contreras EMT July 9, 2024

## 2024-07-15 NOTE — PROGRESS NOTES
ID: Janneth Hirsch is a 28-year-old woman who I follow for multiple sclerosis.  She returns for routine follow-up.  I last saw her in May 2023.    HPI: Janneth gave birth to her daughter in April of this year.  The pregnancy was complicated by preeclampsia, and then Janneth developed postpartum cardiomyopathy.  She remains on treatment for that, tells me that she is expected to recover from that but there is an approximately 30% risk that it could recur with subsequent pregnancies.  She is back taking the Vumerity again, which she had stopped while pursuing pregnancy.  She had a few days of flushing when restarting it but basically none at this point.  She has had no new neurologic symptoms suggestive of interim relapse or disease progression, and really has no residual symptoms at this point from MS.    Past Medical History:   Diagnosis Date    Allergic rhinitis, cause unspecified     Allergic rhinitis    Methicillin susceptible Staphylococcus aureus in conditions classified elsewhere and of unspecified site 6/25/02    Unspecified asthma(493.90) 1/11/05    exacerbation of asthma    Unspecified conductive hearing loss     Unspecified otitis media childhood    Recurrent otitis    MS      Current Outpatient Medications:     acetaminophen (TYLENOL) 325 MG tablet, Take 325-650 mg by mouth every 6 hours as needed, Disp: , Rfl:     cholecalciferol (VITAMIN D3) 25 mcg (1000 units) capsule, Take 1 tablet by mouth daily, Disp: , Rfl:     sacubitril-valsartan (ENTRESTO) 49-51 MG per tablet, Take 1 tablet by mouth 2 times daily, Disp: 60 tablet, Rfl: 3    spironolactone (ALDACTONE) 25 MG tablet, Take 1 tablet by mouth every morning, Disp: , Rfl:     VUMERITY 231 MG CPDR, Take 2 capsules by mouth 2 times daily, Disp: 120 capsule, Rfl: 11    albuterol (PROAIR HFA/PROVENTIL HFA/VENTOLIN HFA) 108 (90 Base) MCG/ACT inhaler, Inhale 2 puffs into the lungs 4 times daily, Disp: , Rfl:     carvedilol (COREG) 6.25 MG tablet, Take 1 tablet  (6.25 mg) by mouth 2 times daily (with meals), Disp: 60 tablet, Rfl: 3    dapagliflozin (FARXIGA) 10 MG TABS tablet, Take 1 tablet by mouth daily (Patient not taking: Reported on 7/9/2024), Disp: , Rfl:     furosemide (LASIX) 20 MG tablet, Take 20 mg by mouth daily as needed (Patient not taking: Reported on 7/9/2024), Disp: , Rfl:     levonorgestrel (MIRENA) 52 MG (20 mcg/day) IUD, 1 Device by Intrauterine route once, Disp: , Rfl:     sacubitril-valsartan (ENTRESTO) 24-26 MG per tablet, Take 1 tablet by mouth 2 times daily, Disp: 60 tablet, Rfl: 3     Exam: She is alert and oriented.  Affect is bright and language functions are normal.  Cranial nerves are unremarkable.  Muscle bulk and tone strength and dexterity are normal in the arms and legs.  Light touch is intact in all 4 limbs.  Reflexes are normal and symmetric at the elbows and knees.  Gait is narrow-based and stable with normal tandem walk and negative Romberg.    Impression: Relapsing remitting multiple sclerosis, stable on Vumerity.  I spent 24 minutes on her care on the date of service including chart review and face-to-face time.  We discussed MRI surveillance, we will go ahead and repeat MRIs now to establish a new baseline since she has been off treatment.    Plan: MRI brain and cervical, which can be done in [place] at her convenience.  Follow-up in 1 year.    This note was completed in part using voice-recognition software, and some typographic errors may be present as a result.

## 2024-07-18 ENCOUNTER — TELEPHONE (OUTPATIENT)
Dept: FAMILY MEDICINE | Facility: CLINIC | Age: 29
End: 2024-07-18
Payer: COMMERCIAL

## 2024-07-18 NOTE — TELEPHONE ENCOUNTER
General Call    Contacts       Contact Date/Time Type Contact Phone/Fax    07/18/2024 11:02 AM CDT Phone (Incoming) Buddy Cano case manager @ [facility] [phone]          Reason for Call:  Pt has access to case management through her [employer]     What are your questions or concerns:  Buddy Cano case manager @ [facility] called to document Pt's chart that she access to case management through her [employer] if she need needs assistance.    Date of last appointment with provider: 05/02/2024    Darby Moreland

## 2024-08-06 ENCOUNTER — MYC REFILL (OUTPATIENT)
Dept: FAMILY MEDICINE | Facility: CLINIC | Age: 29
End: 2024-08-06
Payer: COMMERCIAL

## 2024-08-06 RX ORDER — SPIRONOLACTONE 25 MG/1
25 TABLET ORAL EVERY MORNING
Qty: 90 TABLET | Refills: 1 | Status: SHIPPED | OUTPATIENT
Start: 2024-08-06

## 2024-08-06 NOTE — TELEPHONE ENCOUNTER
Routing refill request to provider for review/approval because:  Medication is reported/historical    Last Written Prescription Date:  4/30/24  historical   Last office visit: 5/2/2024

## 2024-09-04 ENCOUNTER — MEDICAL CORRESPONDENCE (OUTPATIENT)
Dept: HEALTH INFORMATION MANAGEMENT | Facility: CLINIC | Age: 29
End: 2024-09-04
Payer: COMMERCIAL

## 2024-09-12 NOTE — PROGRESS NOTES
MRI reports have been received from [facility], reports placed in Dr. Dee's folder for review.   Shaan Contreras EMT September 12, 2024

## 2024-11-04 RX ORDER — CARVEDILOL 6.25 MG/1
6.25 TABLET ORAL 2 TIMES DAILY WITH MEALS
Qty: 180 TABLET | Refills: 0 | Status: SHIPPED | OUTPATIENT
Start: 2024-11-04

## 2024-11-06 RX ORDER — SACUBITRIL AND VALSARTAN 24; 26 MG/1; MG/1
1 TABLET, FILM COATED ORAL 2 TIMES DAILY
Qty: 60 TABLET | Refills: 1 | Status: SHIPPED | OUTPATIENT
Start: 2024-11-06

## 2025-01-13 RX ORDER — SACUBITRIL AND VALSARTAN 24; 26 MG/1; MG/1
1 TABLET, FILM COATED ORAL 2 TIMES DAILY
Qty: 60 TABLET | Refills: 1 | Status: SHIPPED | OUTPATIENT
Start: 2025-01-13 | End: 2025-01-14

## 2025-01-14 ENCOUNTER — OFFICE VISIT (OUTPATIENT)
Dept: CARDIOLOGY | Facility: CLINIC | Age: 30
End: 2025-01-14
Payer: COMMERCIAL

## 2025-01-14 VITALS
DIASTOLIC BLOOD PRESSURE: 52 MMHG | HEART RATE: 86 BPM | WEIGHT: 126.3 LBS | BODY MASS INDEX: 23.1 KG/M2 | RESPIRATION RATE: 20 BRPM | OXYGEN SATURATION: 97 % | SYSTOLIC BLOOD PRESSURE: 108 MMHG

## 2025-01-14 DIAGNOSIS — I42.8 NON-ISCHEMIC CARDIOMYOPATHY (H): ICD-10-CM

## 2025-01-14 DIAGNOSIS — I34.0 NONRHEUMATIC MITRAL VALVE REGURGITATION: ICD-10-CM

## 2025-01-14 PROCEDURE — G2211 COMPLEX E/M VISIT ADD ON: HCPCS | Performed by: INTERNAL MEDICINE

## 2025-01-14 PROCEDURE — 99214 OFFICE O/P EST MOD 30 MIN: CPT | Performed by: INTERNAL MEDICINE

## 2025-01-14 NOTE — PROGRESS NOTES
HEART CARE ENCOUNTER CONSULTATON [facility]  [phone]      Assessment/Recommendations   Assessment:   Systolic congestive heart failure with reduced ejection fraction, now with HFimEF.    Ejection fraction has improved form 29% to 55%.   Non-ischemic secondary to #2.    Postpartum cardiomyopathy  Moderate functional mitral regurgitation,resolved.   Preeclampsia, severe  Multiple sclerosis  Asthma    Plan:  Off Carvedilol 6.25 mg twice daily for past 4 weeks   Entresto 24-26 mg BID, continue for next month.   Discontinue spironolactone 25 mg daily.    Repeat echo in April 2025, to revaluate LV function with weaning off medications.          History of Present Illness/Subjective    HPI: Janneth Hirsch is a 29 year old female with systolic congestive heart failure, now with HFimEF, related to postpartum cardiomyopathy.    No active cardiac symptoms.  Lightheaded with coreg (stopped) 4 weeks ago.  No orthopnea, PND symptoms or lower extremity edema.  Compliant with home medications.    ECHO: 10/9/2024  The left ventricle is normal in size.  Biplane LVEF is 55%.  No regional wall motion abnormalities noted.  No hemodynamically significant valvular abnormalities on 2D or color flow  imaging.  RVSP 19mmHg  Compared to the prior study dated 5/23/2024 the EF has improved    Echocardiogram Results: 4/25/2024   1. Moderate left ventricular chamber enlargement. Severely decreased left ventricular systolic   function. Calculated left ventricular    ejection fraction (modified Ovalles technique) is 29 %. Generalized left ventricular   hypokinesis.    2. Normal right ventricular size and systolic function.    3. Findings consistent with increased left ventricular filling pressure.    4. Mild left atrial enlargement.    5. Moderate, functional mitral valve regurgitation.    6. Small pericardial effusion. No echocardiographic evidence of cardiac tamponade.    7. Dilated inferior vena cava with  normal inspiratory collapse.    8. There were no prior studies available for comparison.    9. Awaiting callback from ordering provider.    Estimated EF: 25-30%     CT Chest:   1.  Negative for pulmonary emboli.   2.  Diffuse bronchial wall thickening with bilateral perihilar groundglass airspace disease suggests a bilateral infectious or inflammatory pneumonitis.   3.  Intralobular septal thickening as seen with pulmonary edema with bilateral small pleural effusions suggests CHF versus fluid overload.      Physical Examination  Review of Systems   Vitals: /52 (BP Location: Right arm, Patient Position: Sitting, Cuff Size: Adult Regular)   Pulse 86   Resp 20   Wt 57.3 kg (126 lb 4.8 oz)   SpO2 97%   BMI 23.10 kg/m    BMI= Body mass index is 23.1 kg/m .  Wt Readings from Last 3 Encounters:   01/14/25 57.3 kg (126 lb 4.8 oz)   07/09/24 55.9 kg (123 lb 3.2 oz)   07/09/24 54.1 kg (119 lb 4.8 oz)        Pleasant   ENT/Mouth: membranes moist, no oral lesions or bleeding gums.      EYES:  no scleral icterus, normal conjunctivae       Chest/Lungs:   lungs are clear to auscultation, no rales    Cardiovascular:   Regular. Normal first and second heart sounds with no murmurs       Extremities: no cyanosis or clubbing   Skin: no xanthelasma, warm.    Neurologic: normal  bilateral, no tremors     Psychiatric: alert and oriented x3, calm        Please refer above for cardiac ROS details.        Medical History  Surgical History Family History Social History   Past Medical History:   Diagnosis Date    Allergic rhinitis, cause unspecified     Allergic rhinitis    Methicillin susceptible Staphylococcus aureus in conditions classified elsewhere and of unspecified site 6/25/02    Unspecified asthma(493.90) 1/11/05    exacerbation of asthma    Unspecified conductive hearing loss     Unspecified otitis media childhood    Recurrent otitis     Past Surgical History:   Procedure Laterality Date    HC NASAL ENDOSCOPY,  DIAGNOSTIC  4/7/08    HC TYMPANOPLASTY W/O MASTOID, INIT/REV W/O OSS CHAIN RECONST  10/16/06    LT    HC TYMPANOPLASTY W/O MASTOID, INIT/REV W/O OSS CHAIN RECONST  10/15/07    RT    ZZHC CREATE EARDRUM OPENING,GEN ANESTH  1999, 2001    Myringotomy w tubes    ZZHC CREATE EARDRUM OPENING,GEN ANESTH  4/7/08    RT     Family History   Problem Relation Age of Onset    Allergies Father         Tetanus and penicillin    Obesity Father     Psoriasis Father     Hypertension Maternal Grandmother     Arthritis Maternal Grandmother     Thyroid Disease Maternal Grandmother         Hypothyroidism    C.A.D. Maternal Grandfather     Hypertension Paternal Grandmother     Arthritis Paternal Grandmother     Obesity Paternal Grandmother     Gastrointestinal Disease Paternal Grandfather         pocketing in intestines    Rheumatoid Arthritis Paternal Aunt         Social History     Socioeconomic History    Marital status:      Spouse name: Not on file    Number of children: Not on file    Years of education: Not on file    Highest education level: Not on file   Occupational History    Occupation: student   Tobacco Use    Smoking status: Never     Passive exposure: Never    Smokeless tobacco: Never    Tobacco comments:     second hand smoke in the home   Vaping Use    Vaping status: Never Used   Substance and Sexual Activity    Alcohol use: No    Drug use: No    Sexual activity: Never   Other Topics Concern     Service No    Blood Transfusions No    Caffeine Concern Yes     Comment: pop daily    Occupational Exposure Not Asked    Hobby Hazards Not Asked    Sleep Concern Not Asked    Stress Concern Not Asked    Weight Concern Not Asked    Special Diet Not Asked    Back Care Not Asked    Exercise No    Bike Helmet Not Asked    Seat Belt Not Asked    Self-Exams No   Social History Narrative    Not on file     Social Drivers of Health     Financial Resource Strain: Low Risk  (10/25/2023)    Received from [employer]  [facility] [employer] [facility]    Financial Resource Strain     Difficulty of Paying Living Expenses: 3     Difficulty of Paying Living Expenses: Not on file   Food Insecurity: No Food Insecurity (4/27/2024)    Received from [facility] [employer] [facility]    Food Insecurity     Do you worry your food will run out before you are able to buy more?: 1   Transportation Needs: No Transportation Needs (4/27/2024)    Received from [facility] [employer] [facility]    Transportation Needs     Does lack of transportation keep you from medical appointments?: 1     Does lack of transportation keep you from work, meetings or getting things that you need?: 1   Physical Activity: Not on file   Stress: Not on file   Social Connections: Socially Integrated (4/27/2024)    Received from [facility] [employer] [facility] [employer] [facility]    Social Connections     Do you often feel lonely or isolated from those around you?: 0   Interpersonal Safety: Not on file   Housing Stability: Low Risk  (4/27/2024)    Received from [facility] [employer] [facility]    Housing Stability     What is your housing situation today?: 1           Medications  Allergies   Current Outpatient Medications   Medication Sig Dispense Refill    acetaminophen (TYLENOL) 325 MG tablet Take 325-650 mg by mouth every 6 hours as needed      albuterol (PROAIR HFA/PROVENTIL HFA/VENTOLIN HFA) 108 (90 Base) MCG/ACT inhaler Inhale 2 puffs into the lungs 4 times daily      carvedilol (COREG) 6.25 MG tablet TAKE 1 TABLET(6.25 MG) BY MOUTH TWICE DAILY WITH MEALS 180 tablet 0    cholecalciferol (VITAMIN D3) 25 mcg (1000 units) capsule Take 1 tablet by mouth daily      dapagliflozin (FARXIGA) 10 MG TABS tablet Take 1 tablet by mouth daily (Patient not taking: Reported on 7/9/2024)      ENTRESTO 24-26 MG per tablet TAKE 1 TABLET BY MOUTH TWICE DAILY 60 tablet 1    furosemide (LASIX) 20 MG tablet Take 20 mg by  "mouth daily as needed (Patient not taking: Reported on 7/9/2024)      levonorgestrel (MIRENA) 52 MG (20 mcg/day) IUD 1 Device by Intrauterine route once      sacubitril-valsartan (ENTRESTO) 49-51 MG per tablet Take 1 tablet by mouth 2 times daily 60 tablet 3    spironolactone (ALDACTONE) 25 MG tablet Take 1 tablet (25 mg) by mouth every morning 90 tablet 1    VUMERITY 231 MG CPDR Take 2 capsules by mouth 2 times daily 120 capsule 11       Allergies   Allergen Reactions    Adhesive Tape Rash    Pollen Extract Itching          Lab Results    Chemistry/lipid CBC Cardiac Enzymes/BNP/TSH/INR   No results for input(s): \"CHOL\", \"HDL\", \"LDL\", \"TRIG\", \"CHOLHDLRATIO\" in the last 48805 hours.  No results for input(s): \"LDL\" in the last 58193 hours.  Recent Labs   Lab Test 05/02/24  1014      POTASSIUM 5.0   CHLORIDE 103   CO2 23   GLC 74   BUN 18.5   CR 0.68   GFRESTIMATED >90   RENETTA 10.3*     Recent Labs   Lab Test 05/02/24  1014 07/04/19  0939 07/03/19  0203   CR 0.68 0.52 0.64     No results for input(s): \"A1C\" in the last 16890 hours.       Recent Labs   Lab Test 05/02/24  1014   WBC 10.6   HGB 14.4   HCT 46.8   MCV 88   *     Recent Labs   Lab Test 05/02/24  1014 05/31/22  1104 08/04/20  1434   HGB 14.4 15.0 13.7    No results for input(s): \"TROPONINI\" in the last 33692 hours.  Recent Labs   Lab Test 05/02/24  1014   NTBNP 230     No results for input(s): \"TSH\" in the last 38329 hours.  Recent Labs   Lab Test 07/03/19  0616   INR 1.31*        Rich Corbett DO  Cardiologist     The longitudinal plan of care for the diagnosis(es)/condition(s) as documented were addressed during this visit. Due to the added complexity in care, I will continue to support Janneth in the subsequent management and with ongoing continuity of care.                         "

## 2025-01-14 NOTE — LETTER
1/14/2025    Missy Ford MD  [address] [facility] [address]    RE: Jannethngozi Fullercaron       Dear Colleague,     I had the pleasure of seeing Janneth Hirsch in the [facility].    HEART CARE ENCOUNTER CONSULTATON NOTE      [facility]  [phone]      Assessment/Recommendations   Assessment:   Systolic congestive heart failure with reduced ejection fraction, now with HFimEF.    Ejection fraction has improved form 29% to 55%.   Non-ischemic secondary to #2.    Postpartum cardiomyopathy  Moderate functional mitral regurgitation,resolved.   Preeclampsia, severe  Multiple sclerosis  Asthma    Plan:  Off Carvedilol 6.25 mg twice daily for past 4 weeks   Entresto 24-26 mg BID, continue for next month.   Discontinue spironolactone 25 mg daily.    Repeat echo in April 2025, to revaluate LV function with weaning off medications.          History of Present Illness/Subjective    HPI: Janneth Hirsch is a 29 year old female with systolic congestive heart failure, now with HFimEF, related to postpartum cardiomyopathy.    No active cardiac symptoms.  Lightheaded with coreg (stopped) 4 weeks ago.  No orthopnea, PND symptoms or lower extremity edema.  Compliant with home medications.    ECHO: 10/9/2024  The left ventricle is normal in size.  Biplane LVEF is 55%.  No regional wall motion abnormalities noted.  No hemodynamically significant valvular abnormalities on 2D or color flow  imaging.  RVSP 19mmHg  Compared to the prior study dated 5/23/2024 the EF has improved    Echocardiogram Results: 4/25/2024   1. Moderate left ventricular chamber enlargement. Severely decreased left ventricular systolic   function. Calculated left ventricular    ejection fraction (modified Ovalles technique) is 29 %. Generalized left ventricular   hypokinesis.    2. Normal right ventricular size and systolic function.    3. Findings consistent with increased left ventricular filling pressure.    4. Mild  left atrial enlargement.    5. Moderate, functional mitral valve regurgitation.    6. Small pericardial effusion. No echocardiographic evidence of cardiac tamponade.    7. Dilated inferior vena cava with normal inspiratory collapse.    8. There were no prior studies available for comparison.    9. Awaiting callback from ordering provider.    Estimated EF: 25-30%     CT Chest:   1.  Negative for pulmonary emboli.   2.  Diffuse bronchial wall thickening with bilateral perihilar groundglass airspace disease suggests a bilateral infectious or inflammatory pneumonitis.   3.  Intralobular septal thickening as seen with pulmonary edema with bilateral small pleural effusions suggests CHF versus fluid overload.      Physical Examination  Review of Systems   Vitals: /52 (BP Location: Right arm, Patient Position: Sitting, Cuff Size: Adult Regular)   Pulse 86   Resp 20   Wt 57.3 kg (126 lb 4.8 oz)   SpO2 97%   BMI 23.10 kg/m    BMI= Body mass index is 23.1 kg/m .  Wt Readings from Last 3 Encounters:   01/14/25 57.3 kg (126 lb 4.8 oz)   07/09/24 55.9 kg (123 lb 3.2 oz)   07/09/24 54.1 kg (119 lb 4.8 oz)        Pleasant   ENT/Mouth: membranes moist, no oral lesions or bleeding gums.      EYES:  no scleral icterus, normal conjunctivae       Chest/Lungs:   lungs are clear to auscultation, no rales    Cardiovascular:   Regular. Normal first and second heart sounds with no murmurs       Extremities: no cyanosis or clubbing   Skin: no xanthelasma, warm.    Neurologic: normal  bilateral, no tremors     Psychiatric: alert and oriented x3, calm        Please refer above for cardiac ROS details.        Medical History  Surgical History Family History Social History   Past Medical History:   Diagnosis Date     Allergic rhinitis, cause unspecified     Allergic rhinitis     Methicillin susceptible Staphylococcus aureus in conditions classified elsewhere and of unspecified site 6/25/02     Unspecified asthma(493.90) 1/11/05     exacerbation of asthma     Unspecified conductive hearing loss      Unspecified otitis media childhood    Recurrent otitis     Past Surgical History:   Procedure Laterality Date     HC NASAL ENDOSCOPY, DIAGNOSTIC  4/7/08     HC TYMPANOPLASTY W/O MASTOID, INIT/REV W/O OSS CHAIN RECONST  10/16/06    LT     HC TYMPANOPLASTY W/O MASTOID, INIT/REV W/O OSS CHAIN RECONST  10/15/07    RT     ZZHC CREATE EARDRUM OPENING,GEN ANESTH  1999, 2001    Myringotomy w tubes     ZZHC CREATE EARDRUM OPENING,GEN ANESTH  4/7/08    RT     Family History   Problem Relation Age of Onset     Allergies Father         Tetanus and penicillin     Obesity Father      Psoriasis Father      Hypertension Maternal Grandmother      Arthritis Maternal Grandmother      Thyroid Disease Maternal Grandmother         Hypothyroidism     C.A.D. Maternal Grandfather      Hypertension Paternal Grandmother      Arthritis Paternal Grandmother      Obesity Paternal Grandmother      Gastrointestinal Disease Paternal Grandfather         pocketing in intestines     Rheumatoid Arthritis Paternal Aunt         Social History     Socioeconomic History     Marital status:      Spouse name: Not on file     Number of children: Not on file     Years of education: Not on file     Highest education level: Not on file   Occupational History     Occupation: student   Tobacco Use     Smoking status: Never     Passive exposure: Never     Smokeless tobacco: Never     Tobacco comments:     second hand smoke in the home   Vaping Use     Vaping status: Never Used   Substance and Sexual Activity     Alcohol use: No     Drug use: No     Sexual activity: Never   Other Topics Concern      Service No     Blood Transfusions No     Caffeine Concern Yes     Comment: pop daily     Occupational Exposure Not Asked     Hobby Hazards Not Asked     Sleep Concern Not Asked     Stress Concern Not Asked     Weight Concern Not Asked     Special Diet Not Asked     Back Care Not Asked      Exercise No     Bike Helmet Not Asked     Seat Belt Not Asked     Self-Exams No   Social History Narrative     Not on file     Social Drivers of Health     Financial Resource Strain: Low Risk  (10/25/2023)    Received from [facility] [employer] [facility] [employer] [facility], [facility]    Financial Resource Strain      Difficulty of Paying Living Expenses: 3      Difficulty of Paying Living Expenses: Not on file   Food Insecurity: No Food Insecurity (4/27/2024)    Received from [facility] [employer] [facility] [employer] [facility]    Food Insecurity      Do you worry your food will run out before you are able to buy more?: 1   Transportation Needs: No Transportation Needs (4/27/2024)    Received from [facility] [employer] [facility] [employer] [facility]    Transportation Needs      Does lack of transportation keep you from medical appointments?: 1      Does lack of transportation keep you from work, meetings or getting things that you need?: 1   Physical Activity: Not on file   Stress: Not on file   Social Connections: Socially Integrated (4/27/2024)    Received from [facility] [employer] [facility] [employer] [facility]    Social Connections      Do you often feel lonely or isolated from those around you?: 0   Interpersonal Safety: Not on file   Housing Stability: Low Risk  (4/27/2024)    Received from [facility] [employer] [facility]    Housing Stability      What is your housing situation today?: 1           Medications  Allergies   Current Outpatient Medications   Medication Sig Dispense Refill     acetaminophen (TYLENOL) 325 MG tablet Take 325-650 mg by mouth every 6 hours as needed       albuterol (PROAIR HFA/PROVENTIL HFA/VENTOLIN HFA) 108 (90 Base) MCG/ACT inhaler Inhale 2 puffs into the lungs 4 times daily       carvedilol (COREG) 6.25 MG tablet TAKE 1 TABLET(6.25 MG) BY MOUTH TWICE DAILY WITH MEALS 180 tablet 0     cholecalciferol (VITAMIN D3) 25 mcg (1000 units) capsule Take 1 tablet by mouth  "daily       dapagliflozin (FARXIGA) 10 MG TABS tablet Take 1 tablet by mouth daily (Patient not taking: Reported on 7/9/2024)       ENTRESTO 24-26 MG per tablet TAKE 1 TABLET BY MOUTH TWICE DAILY 60 tablet 1     furosemide (LASIX) 20 MG tablet Take 20 mg by mouth daily as needed (Patient not taking: Reported on 7/9/2024)       levonorgestrel (MIRENA) 52 MG (20 mcg/day) IUD 1 Device by Intrauterine route once       sacubitril-valsartan (ENTRESTO) 49-51 MG per tablet Take 1 tablet by mouth 2 times daily 60 tablet 3     spironolactone (ALDACTONE) 25 MG tablet Take 1 tablet (25 mg) by mouth every morning 90 tablet 1     VUMERITY 231 MG CPDR Take 2 capsules by mouth 2 times daily 120 capsule 11       Allergies   Allergen Reactions     Adhesive Tape Rash     Pollen Extract Itching          Lab Results    Chemistry/lipid CBC Cardiac Enzymes/BNP/TSH/INR   No results for input(s): \"CHOL\", \"HDL\", \"LDL\", \"TRIG\", \"CHOLHDLRATIO\" in the last 80013 hours.  No results for input(s): \"LDL\" in the last 67980 hours.  Recent Labs   Lab Test 05/02/24  1014      POTASSIUM 5.0   CHLORIDE 103   CO2 23   GLC 74   BUN 18.5   CR 0.68   GFRESTIMATED >90   RENETTA 10.3*     Recent Labs   Lab Test 05/02/24  1014 07/04/19  0939 07/03/19  0203   CR 0.68 0.52 0.64     No results for input(s): \"A1C\" in the last 43310 hours.       Recent Labs   Lab Test 05/02/24  1014   WBC 10.6   HGB 14.4   HCT 46.8   MCV 88   *     Recent Labs   Lab Test 05/02/24  1014 05/31/22  1104 08/04/20  1434   HGB 14.4 15.0 13.7    No results for input(s): \"TROPONINI\" in the last 25855 hours.  Recent Labs   Lab Test 05/02/24  1014   NTBNP 230     No results for input(s): \"TSH\" in the last 39628 hours.  Recent Labs   Lab Test 07/03/19  0616   INR 1.31*        Rich Corbett DO  Cardiologist     The longitudinal plan of care for the diagnosis(es)/condition(s) as documented were addressed during this visit. Due to the added complexity in care, I will continue to " support Janneth in the subsequent management and with ongoing continuity of care.                             Thank you for allowing me to participate in the care of your patient.      Sincerely,     Rich Corbett DO     [facility]  cc:   Rich Corbett DO  [address] [place] [address]

## 2025-01-14 NOTE — PATIENT INSTRUCTIONS
Please contact direct nurses line Monday through Friday 8 AM to 5 PM @ [phone]    After-hours contact cardiology office at [phone].    Plan:    Stop aldactone    Complete next month lindsay of Entresto

## 2025-04-02 ENCOUNTER — ANCILLARY PROCEDURE (OUTPATIENT)
Dept: CARDIOLOGY | Facility: CLINIC | Age: 30
End: 2025-04-02
Attending: INTERNAL MEDICINE
Payer: COMMERCIAL

## 2025-04-02 DIAGNOSIS — I42.8 NON-ISCHEMIC CARDIOMYOPATHY (H): ICD-10-CM

## 2025-04-02 DIAGNOSIS — I34.0 NONRHEUMATIC MITRAL VALVE REGURGITATION: ICD-10-CM

## 2025-04-02 PROCEDURE — 93306 TTE W/DOPPLER COMPLETE: CPT | Performed by: INTERNAL MEDICINE

## 2025-04-07 ENCOUNTER — TELEPHONE (OUTPATIENT)
Dept: NEUROLOGY | Facility: CLINIC | Age: 30
End: 2025-04-07
Payer: COMMERCIAL

## 2025-04-07 NOTE — TELEPHONE ENCOUNTER
PA Initiation    Medication: VUMERITY 231 MG PO CPDR  Insurance Company: [employer] - Phone [phone] Fax [phone]  Pharmacy Filling the Rx: [facility] - [place], [place] - [address]  Filling Pharmacy Phone:    Filling Pharmacy Fax:    Start Date: 4/7/2025          Thank you,    Jodi Browning CPh-T  Specialty Pharmacy Clinic Liaison - CardiologyNeurologyMultiple [facility] [address]  [address] [place] [address]  Ph: [phone] Fax: [phone]  [email or web address]

## 2025-04-08 NOTE — TELEPHONE ENCOUNTER
Prior Authorization Approval    Medication: VUMERITY 231 MG PO CPDR  Authorization Effective Date: 4/8/2025  Authorization Expiration Date: 4/8/2026  Approved Dose/Quantity: 30 days  Reference #:     Insurance Company: [employer] - Phone [phone] Fax [phone]  Expected CoPay: $    CoPay Card Available:      Financial Assistance Needed:   Which Pharmacy is filling the prescription: [facility] - [place], [place] - [address]   Pharmacy Notified: Yes  Patient Notified: Yes          Thank you,    Jodi Browning [facility]  Specialty Pharmacy Clinic Liaison - CardiologyNeurologyMultiple Sclerosis  [facility]  [address] [place] [address]  Ph: [phone] Fax: [phone]  [email or web address]

## 2025-05-19 DIAGNOSIS — G35 MULTIPLE SCLEROSIS (H): ICD-10-CM

## 2025-05-19 RX ORDER — DIROXIMEL FUMARATE 231 MG/1
2 CAPSULE ORAL 2 TIMES DAILY
Qty: 120 CAPSULE | Refills: 11 | Status: SHIPPED | OUTPATIENT
Start: 2025-05-19

## 2025-05-19 NOTE — TELEPHONE ENCOUNTER
Received refill request for Vumerity from [facility]; Patient was last seen in July 2024 and has follow up appointment in July 2025 with Dr Dee. Refilled per [facility] refill protocol.    Romelia Kapoor RN

## 2025-07-12 ENCOUNTER — HEALTH MAINTENANCE LETTER (OUTPATIENT)
Age: 30
End: 2025-07-12